# Patient Record
Sex: FEMALE | Race: WHITE | NOT HISPANIC OR LATINO | Employment: UNEMPLOYED | URBAN - NONMETROPOLITAN AREA
[De-identification: names, ages, dates, MRNs, and addresses within clinical notes are randomized per-mention and may not be internally consistent; named-entity substitution may affect disease eponyms.]

---

## 2017-01-03 ENCOUNTER — APPOINTMENT (OUTPATIENT)
Dept: PHYSICAL THERAPY | Facility: HOSPITAL | Age: 27
End: 2017-01-03

## 2017-01-09 ENCOUNTER — HOSPITAL ENCOUNTER (OUTPATIENT)
Dept: PHYSICAL THERAPY | Facility: HOSPITAL | Age: 27
Setting detail: THERAPIES SERIES
Discharge: HOME | End: 2017-01-09

## 2017-01-09 DIAGNOSIS — M54.41 BILATERAL LOW BACK PAIN WITH BILATERAL SCIATICA, UNSPECIFIED CHRONICITY: Primary | ICD-10-CM

## 2017-01-09 DIAGNOSIS — M54.42 BILATERAL LOW BACK PAIN WITH BILATERAL SCIATICA, UNSPECIFIED CHRONICITY: Primary | ICD-10-CM

## 2017-01-09 DIAGNOSIS — S33.6XXA SACROILIAC (LIGAMENT) SPRAIN, INITIAL ENCOUNTER: ICD-10-CM

## 2017-01-09 PROCEDURE — 97162 PT EVAL MOD COMPLEX 30 MIN: CPT | Performed by: PHYSICAL THERAPIST
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
## 2017-01-13 ENCOUNTER — APPOINTMENT (OUTPATIENT)
Dept: PHYSICAL THERAPY | Facility: HOSPITAL | Age: 27
End: 2017-01-13

## 2017-01-19 ENCOUNTER — HOSPITAL ENCOUNTER (OUTPATIENT)
Dept: PHYSICAL THERAPY | Facility: HOSPITAL | Age: 27
Setting detail: THERAPIES SERIES
Discharge: HOME | End: 2017-01-19

## 2017-01-19 DIAGNOSIS — S33.6XXA SACROILIAC (LIGAMENT) SPRAIN, INITIAL ENCOUNTER: ICD-10-CM

## 2017-01-19 DIAGNOSIS — M54.42 BILATERAL LOW BACK PAIN WITH BILATERAL SCIATICA, UNSPECIFIED CHRONICITY: Primary | ICD-10-CM

## 2017-01-19 DIAGNOSIS — M54.41 BILATERAL LOW BACK PAIN WITH BILATERAL SCIATICA, UNSPECIFIED CHRONICITY: Primary | ICD-10-CM

## 2017-01-19 PROCEDURE — 97140 MANUAL THERAPY 1/> REGIONS: CPT

## 2017-01-19 PROCEDURE — 97110 THERAPEUTIC EXERCISES: CPT

## 2017-01-19 NOTE — PROGRESS NOTES
Outpatient Physical Therapy Ortho Progress Note  [facility]     Patient Name: Laura Morales  : 1990  MRN: [id number]  Today's Date: 2017      Visit Date: 2017    Visit Dx:    ICD-10-CM ICD-9-CM   1. Bilateral low back pain with bilateral sciatica, unspecified chronicity M54.42 724.3    M54.41    2. Sacroiliac (ligament) sprain, initial encounter S33.6XXA 846.1       There is no problem list on file for this patient.       Past Medical History   Diagnosis Date   • Fracture of rib of left side         Past Surgical History   Procedure Laterality Date   • Cystectomy Right 2014     right breast                             PT Assessment/Plan       17 1434          PT Assessment    Assessment Comments Patient has not met any goals at this time due to this being her first session since initial evaulation.  She continues to complain of pain in low back and described as burning and dull pain.  She presented with very guarded lumbar paraspinals and bilateral piriformis muscles.with lumbosacral tenderness.  -KELLY      PT Plan    PT Plan Comments We will continue to focus on flexibility and core strengthening.  -KELLY        User Key  (r) = Recorded By, (t) = Taken By, (c) = Cosigned By    Initials Name Provider Type    KELLY Radford PTA Physical Therapy Assistant                    Exercises       17 1437          Subjective Comments    Subjective Comments Patient voices her pain is characterized as burning and dull pain mainly in right side.  -KELLY      Subjective Pain    Able to rate subjective pain? yes  -KELLY      Pre-Treatment Pain Level 5   Burning and dull pain   -KELLY      Post-Treatment Pain Level 4  -KELLY      Exercise 1    Exercise Name 1 Prone on elbows   after cat/cow stretches, prayer stretch d/t N/T in RLE  -KELLY      Cueing 1 Verbal;Tactile   N/T subsided once in MARCIE  -KELLY      Reps 1 3  -KELLY      Time (Minutes) 1 2  -KELLY      Exercise 2    Exercise Name 2 prayer stretch  -KELLY       Cueing 2 Verbal;Tactile  -KELLY      Reps 2 3  -KELLY      Time (Seconds) 2 20 seconds   -KELLY      Intensity 2 --   Increased N/T after performing  -KELLY      Exercise 3    Exercise Name 3 cat/cow stretch  -KELLY      Cueing 3 Verbal;Tactile  -KELLY      Reps 3 5  -KELLY      Time (Seconds) 3 10 seconds   each position  -KELLY      Intensity 3 --   increased N/T after performing  -KELLY      Exercise 4    Exercise Name 4 jeaneth SKTC  -KELLY      Cueing 4 Verbal;Tactile  -KELLY      Reps 4 3   each side  -KELLY      Time (Seconds) 4 20 seconds  -KELLY      Exercise 5    Exercise Name 5 lumbar rotation stretch  -KELLY      Cueing 5 Verbal  -KELLY      Sets 5 3   each side  -KELLY      Time (Seconds) 5 20 seconds  -KELLY      Exercise 6    Exercise Name 6 PPT   added to HEP  -KELLY      Cueing 6 Tactile  -KELLY      Sets 6 2  -KELLY      Reps 6 10  -KELLY      Time (Seconds) 6 3 second hold  -KELLY      Exercise 7    Exercise Name 7 PPT bridges   added to HEP  -KELLY      Cueing 7 Verbal  -KELLY      Sets 7 2  -KELLY      Reps 7 10  -KELLY        User Key  (r) = Recorded By, (t) = Taken By, (c) = Cosigned By    Initials Name Provider Type    KELLY Radford PTA Physical Therapy Assistant                        Manual Rx (last 36 hours)      Manual Treatments       01/19/17 1446          Manual Rx 1    Manual Rx 1 Location STM to jeaneth lumbar paraspinals, piriformis  -KELLY      Manual Rx 1 Grade light  -KELLY      Manual Rx 1 Duration 8   increased N/T after completing, MARCIE relieved  -KELLY        User Key  (r) = Recorded By, (t) = Taken By, (c) = Cosigned By    Initials Name Provider Type    KELLY Radford PTA Physical Therapy Assistant                PT OP Goals       01/19/17 1434 01/09/17 1700       PT Short Term Goals    STG Date to Achieve 01/30/17  -KELLY 01/30/17  -TB     STG 1 Show symmetrical jeaneth pelvis and leg lengths  -KELLY Show symmetrical jeaneth pelvis and leg lengths  -TB     STG 1 Progress Ongoing  -KELLY New  -TB     STG 2 No radicular symptoms below her knee  -KELLY No radicular symptoms  below her knee  -TB     STG 2 Progress Ongoing  -KELLY New  -TB     STG 2 Progress Comments Patient having N/T into R leg today during treatment, was relieved by MARCIE.  Patient voices she has radicular symptoms 1-2 per week  -KELLY      Long Term Goals    LTG Date to Achieve 02/20/17  -KELLY 02/20/17  -TB     LTG 1 Reports no pain with functional activities at home except occasional minor twinges no more than 1-2/10  -KELLY Reports no pain with functional activities at home except occasional minor twinges no more than 1-2/10  -TB     LTG 1 Progress Ongoing  -KELLY New  -TB     LTG 2 Independent with HEP for core stability and flexibility  -KELLY Independent with HEP for core stability and flexibility  -TB     LTG 2 Progress Ongoing  -KELLY New  -TB     LTG 2 Progress Comments Added PPT, and bridges today  -KELLY      LTG 3 Full flexibility of hips in all planes with no pain  -KELLY Full flexibility of hips in all planes with no pain  -TB     LTG 3 Progress Ongoing  -KELLY New  -TB     LTG 3 Progress Comments Worked on flexibility today  -KELLY      Time Calculation    PT Goal Re-Cert Due Date 02/08/17  -KELLY 02/08/17  -TB       User Key  (r) = Recorded By, (t) = Taken By, (c) = Cosigned By    Initials Name Provider Type    TB Lincoln Adams, PT Physical Therapist    KELLY Radford PTA Physical Therapy Assistant                Therapy Education       01/19/17 1656    Therapy Education    Given HEP   PPT and bridges  -KELLY    Program New  -KELLY    How Provided Verbal;Written  -KELLY    Provided to Patient  -KELLY    Level of Understanding Verbalized;Demonstrated  -KELLY      User Key  (r) = Recorded By, (t) = Taken By, (c) = Cosigned By    Initials Name Provider Type    KELLY Radford PTA Physical Therapy Assistant                Time Calculation:   Start Time: 1434  Stop Time: 1515  Time Calculation (min): 41 min  Total Timed Code Minutes- PT: 41 minute(s)    Therapy Charges for Today     Code Description Service Date Service Provider Modifiers Qty     99234198739  PT MANUAL THERAPY EA 15 MIN 1/19/2017 Suleiman Radford, PTA GP 1    [id number]  PT THER PROC EA 15 MIN 1/19/2017 Suleiman Radford, PTA GP 2                    Suleiman Radford, PTA  1/19/2017

## 2017-01-20 ENCOUNTER — HOSPITAL ENCOUNTER (OUTPATIENT)
Dept: PHYSICAL THERAPY | Facility: HOSPITAL | Age: 27
Setting detail: THERAPIES SERIES
Discharge: HOME | End: 2017-01-20

## 2017-01-20 DIAGNOSIS — S33.6XXA SACROILIAC (LIGAMENT) SPRAIN, INITIAL ENCOUNTER: ICD-10-CM

## 2017-01-20 DIAGNOSIS — M54.42 BILATERAL LOW BACK PAIN WITH BILATERAL SCIATICA, UNSPECIFIED CHRONICITY: Primary | ICD-10-CM

## 2017-01-20 DIAGNOSIS — M54.41 BILATERAL LOW BACK PAIN WITH BILATERAL SCIATICA, UNSPECIFIED CHRONICITY: Primary | ICD-10-CM

## 2017-01-20 PROCEDURE — 97110 THERAPEUTIC EXERCISES: CPT | Performed by: PHYSICAL THERAPIST

## 2017-01-20 PROCEDURE — 97116 GAIT TRAINING THERAPY: CPT | Performed by: PHYSICAL THERAPIST

## 2017-01-20 NOTE — PROGRESS NOTES
Outpatient Physical Therapy Ortho Treatment Note  [facility]     Patient Name: Laura Morales  : 1990  MRN: [id number]  Today's Date: 2017      Visit Date: 2017    Visit Dx:    ICD-10-CM ICD-9-CM   1. Bilateral low back pain with bilateral sciatica, unspecified chronicity M54.42 724.3    M54.41    2. Sacroiliac (ligament) sprain, initial encounter S33.6XXA 846.1       There is no problem list on file for this patient.       Past Medical History   Diagnosis Date   • Fracture of rib of left side         Past Surgical History   Procedure Laterality Date   • Cystectomy Right 2014     right breast                             PT Assessment/Plan       17 1410 17 1434       PT Assessment    Assessment Comments No goals met. She reports increased pain today. I updated her HEP to work on improving mobility. She did have decreased lumbar mobiltiy from L4-S1.   -KR Patient has not met any goals at this time due to this being her first session since initial evaulation.  She continues to complain of pain in low back and described as burning and dull pain.  She presented with very guarded lumbar paraspinals and bilateral piriformis muscles.with lumbosacral tenderness.  -KELLY     PT Plan    PT Plan Comments Continue to work trunk mobiltiy and progress core stabiltiy.   -KR We will continue to focus on flexibility and core strengthening.  -KELLY       User Key  (r) = Recorded By, (t) = Taken By, (c) = Cosigned By    Initials Name Provider Type    ANDRIY Morley, PT Physical Therapist    KELLY Radford, RAND Physical Therapy Assistant                    Exercises       17 1410          Subjective Comments    Subjective Comments She report everything hurts, from work. 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 She reports trying to wear belt when up and moving, but currently not wearing. She reports doing stretch as much as she can. She repots doing a  little more lifting at work today than normal.   -KR      Subjective Pain    Able to rate subjective pain? yes  -KR      Pre-Treatment Pain Level 7  -KR      Post-Treatment Pain Level 7  -KR      Subjective Pain Comment burning in lower back. no radiculalr symptoms now.   -KR      Exercise 1    Exercise Name 1 figure 4 piriformis stretch  -KR      Cueing 1 Verbal;Tactile  -KR      Reps 1 3  -KR      Time (Seconds) 1 30  -KR      Exercise 2    Exercise Name 2 lumbar rotation with SB   -KR      Cueing 2 Verbal  -KR      Reps 2 2  -KR      Time (Seconds) 2 20  -KR      Exercise 3    Exercise Name 3 LTR on SB in small motion  -KR      Reps 3 20  -KR      Exercise 4    Exercise Name 4 mini bridges on SB  with PPT   -KR      Sets 4 2  -KR      Reps 4 10  -KR      Exercise 5    Exercise Name 5 prayer stretch   -KR      Sets 5 3  -KR      Time (Seconds) 5 30  -KR        User Key  (r) = Recorded By, (t) = Taken By, (c) = Cosigned By    Initials Name Provider Type    ANDRIY Morley, PT Physical Therapist                        Manual Rx (last 36 hours)      Manual Treatments       01/20/17 1410 01/19/17 1446       Manual Rx 1    Manual Rx 1 Location in massage chair worked lumbar UPAs   -KR STM to jeaneth lumbar paraspinals, piriformis  -KELLY     Manual Rx 1 Type L4-S1  -KR      Manual Rx 1 Grade 1/2  -KR light  -KELLY     Manual Rx 1 Duration 5  -KR 8   increased N/T after completing, MARCIE relieved  -KELLY     Manual Rx 2    Manual Rx 2 Location in massge chair lumbar paraspinals  -KR      Manual Rx 2 Type STM light  -KR      Manual Rx 2 Duration 6  -KR        User Key  (r) = Recorded By, (t) = Taken By, (c) = Cosigned By    Initials Name Provider Type    ANDRIY Morley, PT Physical Therapist    KELLY Radford, PTA Physical Therapy Assistant                PT OP Goals       01/20/17 1410 01/19/17 1434 01/09/17 1700    PT Short Term Goals    STG Date to Achieve 01/30/17  -KR 01/30/17  -KELLY 01/30/17  -TB    STG 1 Show  symmetrical jeaneth pelvis and leg lengths  -KR Show symmetrical jeaneth pelvis and leg lengths  -KELLY Show symmetrical jeaneth pelvis and leg lengths  -TB    STG 1 Progress Ongoing  -KR Ongoing  -KELLY New  -TB    STG 1 Progress Comments R iliac creast and ASIS higher than L in supine, no abnormality in standing   -KR      STG 2 No radicular symptoms below her knee  -KR No radicular symptoms below her knee  -KELLY No radicular symptoms below her knee  -TB    STG 2 Progress Ongoing  -KR Ongoing  -KELLY New  -TB    STG 2 Progress Comments none today; only burning in lower back  -KR Patient having N/T into R leg today during treatment, was relieved by MARCIE.  Patient voices she has radicular symptoms 1-2 per week  -KELLY     Long Term Goals    LTG Date to Achieve 02/20/17  -KR 02/20/17  -KELLY 02/20/17  -TB    LTG 1 Reports no pain with functional activities at home except occasional minor twinges no more than 1-2/10  -KR Reports no pain with functional activities at home except occasional minor twinges no more than 1-2/10  -KELLY Reports no pain with functional activities at home except occasional minor twinges no more than 1-2/10  -TB    LTG 1 Progress Ongoing  -KR Ongoing  -KELLY New  -TB    LTG 1 Progress Comments 7/10 today   -KR      LTG 2 Independent with HEP for core stability and flexibility  -KR Independent with HEP for core stability and flexibility  -KELLY Independent with HEP for core stability and flexibility  -TB    LTG 2 Progress Ongoing  -KR Ongoing  -KELLY New  -TB    LTG 2 Progress Comments  Added PPT, and bridges today  -KELLY     LTG 3 Full flexibility of hips in all planes with no pain  -KR Full flexibility of hips in all planes with no pain  -KELLY Full flexibility of hips in all planes with no pain  -TB    LTG 3 Progress Ongoing  -KR Ongoing  -KELLY New  -TB    LTG 3 Progress Comments  Worked on flexibility today  -KELLY     Time Calculation    PT Goal Re-Cert Due Date 02/08/17  -KR 02/08/17  -KELLY 02/08/17  -TB      User Key  (r) = Recorded By, (t)  = Taken By, (c) = Cosigned By    Initials Name Provider Type    TB Lincoln Adams, PT Physical Therapist    ANDRIY Morley, PT Physical Therapist    KELLY Radford, RAND Physical Therapy Assistant                Therapy Education       01/20/17 1410    Therapy Education    Given Symptoms/condition management;HEP;Posture/body mechanics  -KR    Program New   prayer stretch figure 4 piriformis stretch  -KR    How Provided Verbal;Demonstration;Written  -KR    Provided to Patient  -KR    Level of Understanding Verbalized;Demonstrated  -KR      01/19/17 1656    Therapy Education    Given HEP   PPT and bridges  -KELLY    Program New  -KELLY    How Provided Verbal;Written  -KELLY    Provided to Patient  -KELLY    Level of Understanding Verbalized;Demonstrated  -KELLY      User Key  (r) = Recorded By, (t) = Taken By, (c) = Cosigned By    Initials Name Provider Type    ANDRIY Morley, PT Physical Therapist    KELLY Radford, RAND Physical Therapy Assistant                Time Calculation:   Start Time: 1410  Stop Time: 1455  Time Calculation (min): 45 min  Total Timed Code Minutes- PT: 45 minute(s)    Therapy Charges for Today     Code Description Service Date Service Provider Modifiers Qty    56739218807 HC PT THER PROC EA 15 MIN 1/20/2017 Anh Morley, PT GP 2    [id number] HC GAIT TRAINING EA 15 MIN 1/20/2017 Anh Morley, PT GP 1                    Anh Morley, PT  1/20/2017

## 2017-01-24 ENCOUNTER — APPOINTMENT (OUTPATIENT)
Dept: PHYSICAL THERAPY | Facility: HOSPITAL | Age: 27
End: 2017-01-24

## 2017-01-25 ENCOUNTER — HOSPITAL ENCOUNTER (OUTPATIENT)
Dept: PHYSICAL THERAPY | Facility: HOSPITAL | Age: 27
Setting detail: THERAPIES SERIES
Discharge: HOME | End: 2017-01-25

## 2017-01-25 DIAGNOSIS — M54.42 BILATERAL LOW BACK PAIN WITH BILATERAL SCIATICA, UNSPECIFIED CHRONICITY: Primary | ICD-10-CM

## 2017-01-25 DIAGNOSIS — M54.41 BILATERAL LOW BACK PAIN WITH BILATERAL SCIATICA, UNSPECIFIED CHRONICITY: Primary | ICD-10-CM

## 2017-01-25 DIAGNOSIS — S33.6XXA SACROILIAC (LIGAMENT) SPRAIN, INITIAL ENCOUNTER: ICD-10-CM

## 2017-01-25 PROCEDURE — 97110 THERAPEUTIC EXERCISES: CPT

## 2017-01-25 PROCEDURE — 97140 MANUAL THERAPY 1/> REGIONS: CPT

## 2017-01-27 ENCOUNTER — APPOINTMENT (OUTPATIENT)
Dept: PHYSICAL THERAPY | Facility: HOSPITAL | Age: 27
End: 2017-01-27

## 2017-02-01 ENCOUNTER — HOSPITAL ENCOUNTER (OUTPATIENT)
Dept: PHYSICAL THERAPY | Facility: HOSPITAL | Age: 27
Setting detail: THERAPIES SERIES
Discharge: HOME | End: 2017-02-01

## 2017-02-01 DIAGNOSIS — S33.6XXA SACROILIAC (LIGAMENT) SPRAIN, INITIAL ENCOUNTER: ICD-10-CM

## 2017-02-01 DIAGNOSIS — M54.41 BILATERAL LOW BACK PAIN WITH BILATERAL SCIATICA, UNSPECIFIED CHRONICITY: Primary | ICD-10-CM

## 2017-02-01 DIAGNOSIS — M54.42 BILATERAL LOW BACK PAIN WITH BILATERAL SCIATICA, UNSPECIFIED CHRONICITY: Primary | ICD-10-CM

## 2017-02-01 PROCEDURE — 97110 THERAPEUTIC EXERCISES: CPT

## 2017-02-01 PROCEDURE — 97140 MANUAL THERAPY 1/> REGIONS: CPT

## 2017-02-01 NOTE — PROGRESS NOTES
Outpatient Physical Therapy Ortho Treatment Note  [facility]     Patient Name: Laura Morales  : 1990  MRN: [id number]  Today's Date: 2017      Visit Date: 2017    Visit Dx:    ICD-10-CM ICD-9-CM   1. Bilateral low back pain with bilateral sciatica, unspecified chronicity M54.42 724.3    M54.41    2. Sacroiliac (ligament) sprain, initial encounter S33.6XXA 846.1       There is no problem list on file for this patient.       Past Medical History   Diagnosis Date   • Fracture of rib of left side         Past Surgical History   Procedure Laterality Date   • Cystectomy Right 2014     right breast                             PT Assessment/Plan       17 1205          PT Assessment    Assessment Comments She reports her symptoms are basically the same except at times her radicular pain will be better. She is non compliant with her SI belt and I educated her once again on the importance of wearing the belt at this time in therapy especially with increased activity, walking, prolonged activity. She reported that she understood and will try to be more compliant. Her right anterior hip capsule was hypomobile today limiting and painful into flexion and IR, this pain was relieved and ROM improved post manual techniques. She still demontrates guarding and ST restrictions along her SI and thoracolumbar spines that continues to refer pain and limit motion d/t fear avoidance. We will continue to address these impairments.   -TC      PT Plan    PT Plan Comments Added neutral bridge, will reassess next session. Continue to focus on thoracolumbar and hip mobility andguarding in order to restore ROM. Reassess SI belt wear. Progress with simultaneous gluteal and core activation activities as tolerated.   -TC        User Key  (r) = Recorded By, (t) = Taken By, (c) = Cosigned By    Initials Name Provider Type    TC Vianca Jewell, PT Physical Therapist                    Exercises       17 [id number]           Subjective Comments    Subjective Comments Pt reported that friday and sat she was a little flared. She sat for >4 hours for basket ball gaame and thinks this may be cause.   -TC      Subjective Pain    Able to rate subjective pain? yes  -TC      Pre-Treatment Pain Level 4  -TC      Post-Treatment Pain Level 4  -TC      Subjective Pain Comment lower back to tailbone  -TC      Exercise 1    Exercise Name 1    -TC      Exercise 2    Exercise Name 2 mini bridge with lower abd contraction    -TC      Cueing 2 Demo  -TC      Sets 2 3  -TC      Reps 2 12  -TC      Exercise 3    Exercise Name 3 mini bridge on green physioball   -TC      Cueing 3 Demo  -TC      Sets 3 3  -TC      Reps 3 12  -TC      Exercise 4    Exercise Name 4 L hip ER stretch self   -TC      Reps 4 3  -TC      Time (Seconds) 4 15s   -TC        User Key  (r) = Recorded By, (t) = Taken By, (c) = Cosigned By    Initials Name Provider Type    TC Vianca Jewell, PT Physical Therapist                        Manual Rx (last 36 hours)      Manual Treatments       02/01/17 1500 02/01/17 1436       Manual Rx 1    Manual Rx 1 Location  STM thoracolumbar spine and R piriformis   -TC     Manual Rx 1 Duration  10  -TC     Manual Rx 2    Manual Rx 2 Location  prone ext mobs   -TC     Manual Rx 2 Type    -TC thoracic and L1-2, PA to Sacrum  -TC     Manual Rx 2 Grade  2-3  -TC     Manual Rx 2 Duration  10  -TC     Manual Rx 3    Manual Rx 3 Location  Anterior R hip  -TC     Manual Rx 3 Type  mob/stretch  -TC     Manual Rx 3 Grade  2-3  -TC     Manual Rx 3 Duration  5  -TC     Manual Rx 4    Manual Rx 4 Location  LAD R hip  -TC     Manual Rx 4 Grade  2-3  -TC     Manual Rx 4 Duration  5  -TC       User Key  (r) = Recorded By, (t) = Taken By, (c) = Cosigned By    Initials Name Provider Type    TC Vianca Jewell, PT Physical Therapist                PT OP Goals       02/01/17 1436 01/25/17 1427 01/25/17 1400    PT Short Term Goals    STG Date to Achieve  01/30/17  -TC  01/30/17  -EC    STG 1 Show symmetrical jeaneth pelvis and leg lengths  -TC  Show symmetrical jeaneth pelvis and leg lengths  -EC    STG 1 Progress Ongoing  -TC  Ongoing  -EC    STG 1 Progress Comments   symmetrical post session today  -EC    STG 2 No radicular symptoms below her knee  -TC  No radicular symptoms below her knee  -EC    STG 2 Progress Ongoing  -TC  Ongoing  -EC    STG 2 Progress Comments she reports she has not had as much radicular pain but it is still present   -TC  R LE into first toe, L LE in calf and fifth toe  -EC    Long Term Goals    LTG Date to Achieve 02/20/17  -TC  02/20/17  -EC    LTG 1 Reports no pain with functional activities at home except occasional minor twinges no more than 1-2/10  -TC  Reports no pain with functional activities at home except occasional minor twinges no more than 1-2/10  -EC    LTG 1 Progress Ongoing  -TC  Ongoing  -EC    LTG 1 Progress Comments 4/10 today pre and post   -TC      LTG 2 Independent with HEP for core stability and flexibility  -TC  Independent with HEP for core stability and flexibility  -EC    LTG 2 Progress Ongoing  -TC  Ongoing  -EC    LTG 2 Progress Comments reports she is istermittently compliant  -TC      LTG 3 Full flexibility of hips in all planes with no pain  -TC  Full flexibility of hips in all planes with no pain  -EC    LTG 3 Progress Ongoing  -TC  Ongoing  -EC    LTG 3 Progress Comments her R hip mobility was improved by 50% today post manual techniques   -TC      Time Calculation    PT Goal Re-Cert Due Date 02/08/17  -TC 02/08/17  -EC       01/20/17 1410 01/19/17 1434       PT Short Term Goals    STG Date to Achieve 01/30/17  -KR 01/30/17  -KELLY     STG 1 Show symmetrical jeaneth pelvis and leg lengths  -KR Show symmetrical jeaneth pelvis and leg lengths  -KELLY     STG 1 Progress Ongoing  -KR Ongoing  -KELLY     STG 1 Progress Comments R iliac creast and ASIS higher than L in supine, no abnormality in standing   -KR      STG 2 No radicular  symptoms below her knee  -KR No radicular symptoms below her knee  -KELLY     STG 2 Progress Ongoing  -KR Ongoing  -KELLY     STG 2 Progress Comments none today; only burning in lower back  -KR Patient having N/T into R leg today during treatment, was relieved by MARCIE.  Patient voices she has radicular symptoms 1-2 per week  -KELLY     Long Term Goals    LTG Date to Achieve 02/20/17  -KR 02/20/17  -KELLY     LTG 1 Reports no pain with functional activities at home except occasional minor twinges no more than 1-2/10  -KR Reports no pain with functional activities at home except occasional minor twinges no more than 1-2/10  -KELLY     LTG 1 Progress Ongoing  -KR Ongoing  -KELLY     LTG 1 Progress Comments 7/10 today   -KR      LTG 2 Independent with HEP for core stability and flexibility  -KR Independent with HEP for core stability and flexibility  -KELLY     LTG 2 Progress Ongoing  -KR Ongoing  -KELLY     LTG 2 Progress Comments  Added PPT, and bridges today  -KELLY     LTG 3 Full flexibility of hips in all planes with no pain  -KR Full flexibility of hips in all planes with no pain  -KELLY     LTG 3 Progress Ongoing  -KR Ongoing  -KELLY     LTG 3 Progress Comments  Worked on flexibility today  -KELLY     Time Calculation    PT Goal Re-Cert Due Date 02/08/17  -KR 02/08/17  -KELLY       User Key  (r) = Recorded By, (t) = Taken By, (c) = Cosigned By    Initials Name Provider Type    EC Michele Montero, PTA Physical Therapy Assistant    ANDRIY Morley, PT Physical Therapist    KELLY Suleiman Radford, PTA Physical Therapy Assistant    AGUSTO Jewell, PT Physical Therapist                Therapy Education       02/01/17 1533    Therapy Education    Given HEP;Posture/body mechanics  -TC    Program New   added neutral bridge  -TC    How Provided Verbal;Demonstration;Written  -TC    Provided to Patient  -TC    Level of Understanding Teach back education performed;Verbalized;Demonstrated  -TC      User Key  (r) = Recorded By, (t) = Taken By, (c) =  Cosigned By    Initials Name Provider Type    TC Vianca Jewell, PT Physical Therapist                Time Calculation:   Start Time: 1436  Stop Time: 1522  Time Calculation (min): 46 min  Total Timed Code Minutes- PT: 46 minute(s)    Therapy Charges for Today     Code Description Service Date Service Provider Modifiers Qty    19277779879 HC PT MANUAL THERAPY EA 15 MIN 2/1/2017 Vianca Jewell, PT GP 2    08563652242 HC PT THER PROC EA 15 MIN 2/1/2017 Vianca Jewell, PT GP 1                    Vianca Jewell, PT  2/1/2017

## 2017-02-03 ENCOUNTER — HOSPITAL ENCOUNTER (OUTPATIENT)
Dept: PHYSICAL THERAPY | Facility: HOSPITAL | Age: 27
Setting detail: THERAPIES SERIES
Discharge: HOME | End: 2017-02-03

## 2017-02-03 DIAGNOSIS — M54.42 BILATERAL LOW BACK PAIN WITH BILATERAL SCIATICA, UNSPECIFIED CHRONICITY: Primary | ICD-10-CM

## 2017-02-03 DIAGNOSIS — M54.41 BILATERAL LOW BACK PAIN WITH BILATERAL SCIATICA, UNSPECIFIED CHRONICITY: Primary | ICD-10-CM

## 2017-02-03 DIAGNOSIS — S33.6XXA SACROILIAC (LIGAMENT) SPRAIN, INITIAL ENCOUNTER: ICD-10-CM

## 2017-02-03 PROCEDURE — 97110 THERAPEUTIC EXERCISES: CPT

## 2017-02-03 NOTE — PROGRESS NOTES
Outpatient Physical Therapy Ortho Treatment Note  [facility]     Patient Name: Laura Morales  : 1990  MRN: [id number]  Today's Date: 2/3/2017      Visit Date: 2017    Visit Dx:    ICD-10-CM ICD-9-CM   1. Bilateral low back pain with bilateral sciatica, unspecified chronicity M54.42 724.3    M54.41    2. Sacroiliac (ligament) sprain, initial encounter S33.6XXA 846.1       There is no problem list on file for this patient.       Past Medical History   Diagnosis Date   • Fracture of rib of left side         Past Surgical History   Procedure Laterality Date   • Cystectomy Right 2014     right breast                             PT Assessment/Plan       17 1537 17 1535       PT Assessment    Assessment Comments She presented today with R upsliped R rotated pelvic orientaton prior to intervention. She has intermittent  radicular symptoms thata do change in nature and soome are atypical.  -EC She reports her symptoms are basically the same except at times her radicular pain will be better. She is non compliant with her SI belt and I educated her once again on the importance of wearing the belt at this time in therapy especially with increased activity, walking, prolonged activity. She reported that she understood and will try to be more compliant. Her right anterior hip capsule was hypomobile today limiting and painful into flexion and IR, this pain was relieved and ROM improved post manual techniques. She still demontrates guarding and ST restrictions along her SI and thoracolumbar spines that continues to refer pain and limit motion d/t fear avoidance. We will continue to address these impairments.   -TC     PT Plan    PT Plan Comments Review all goalss during the next session for recertification and progress as her symptoms allow.  -EC Added neutral bridge, will reassess next session. Continue to focus on thoracolumbar and hip mobility andguarding in order to restore ROM. Reassess SI  belt wear. Progress with simultaneous gluteal and core activation activities as tolerated.   -TC       User Key  (r) = Recorded By, (t) = Taken By, (c) = Cosigned By    Initials Name Provider Type    EC Michele Montero PTA Physical Therapy Assistant    TC Vianca Jewell, PT Physical Therapist                    Exercises       02/03/17 1400          Subjective Comments    Subjective Comments Pt reports a burning sensation   -EC      Subjective Pain    Able to rate subjective pain? yes  -EC      Pre-Treatment Pain Level 2  -EC      Post-Treatment Pain Level 0  -EC      Exercise 1    Exercise Name 1 B isometric hip add  -EC      Cueing 1 Verbal;Tactile  -EC      Equipment 1 --   orange ball  -EC      Reps 1 15  -EC      Exercise 2    Exercise Name 2 isometric pelvic rotation  -EC      Equipment 2 --   45 cm swiss ball  -EC      Reps 2 15  -EC      Exercise 3    Exercise Name 3 B LE muscle synergy  -EC      Reps 3 10  -EC      Exercise 4    Exercise Name 4 R hip IR stretches preceeded by inferior lateral hip glides Grade 2  -EC      Reps 4 3  -EC      Time (Seconds) 4 30  -EC      Exercise 5    Exercise Name 5 assessed orientation of pelvis in standing and hooklying before and after interventions above  -EC      Exercise 6    Exercise Name 6 bridging  -EC      Cueing 6 Verbal  -EC      Reps 6 20  -EC      Exercise 7    Exercise Name 7 hookying clamshells   -EC      Equipment 7 Theraband  -EC      Resistance 7 Red  -EC      Reps 7 15  -EC      Exercise 8    Exercise Name 8 SLFO's  -EC      Reps 8 10  -EC        User Key  (r) = Recorded By, (t) = Taken By, (c) = Cosigned By    Initials Name Provider Type    EC Michele Montero PTA Physical Therapy Assistant                        Manual Rx (last 36 hours)      Manual Treatments       02/03/17 1500          Manual Rx 1    Manual Rx 1 Location B gastroc  -EC      Manual Rx 1 Type IASTM with EDGE tool  -EC      Manual Rx 1 Duration 5   2.5 each  -EC        User Key   (r) = Recorded By, (t) = Taken By, (c) = Cosigned By    Initials Name Provider Type    ABBY Montero, RAND Physical Therapy Assistant                PT OP Goals       02/03/17 1500 02/03/17 1430 02/01/17 1436    PT Short Term Goals    STG Date to Achieve 01/30/17  -EC  01/30/17  -TC    STG 1 Show symmetrical jeaneth pelvis and leg lengths  -EC  Show symmetrical jeaneth pelvis and leg lengths  -TC    STG 1 Progress Ongoing  -EC  Ongoing  -TC    STG 2 No radicular symptoms below her knee  -EC  No radicular symptoms below her knee  -TC    STG 2 Progress Ongoing  -EC  Ongoing  -TC    STG 2 Progress Comments continues to report intermittent radicular symptoms  -EC  she reports she has not had as much radicular pain but it is still present   -TC    Long Term Goals    LTG Date to Achieve 02/20/17  -EC  02/20/17  -TC    LTG 1 Reports no pain with functional activities at home except occasional minor twinges no more than 1-2/10  -EC  Reports no pain with functional activities at home except occasional minor twinges no more than 1-2/10  -TC    LTG 1 Progress Ongoing  -EC  Ongoing  -TC    LTG 1 Progress Comments   4/10 today pre and post   -TC    LTG 2 Independent with HEP for core stability and flexibility  -EC  Independent with HEP for core stability and flexibility  -TC    LTG 2 Progress Ongoing  -EC  Ongoing  -TC    LTG 2 Progress Comments   reports she is istermittently compliant  -TC    LTG 3 Full flexibility of hips in all planes with no pain  -EC  Full flexibility of hips in all planes with no pain  -TC    LTG 3 Progress Ongoing  -EC  Ongoing  -TC    LTG 3 Progress Comments R hip IR improved but c/o tightness and discomfort  -EC  her R hip mobility was improved by 50% today post manual techniques   -TC    Time Calculation    PT Goal Re-Cert Due Date  02/08/17  -EC 02/08/17  -TC      01/25/17 1427 01/25/17 1400       PT Short Term Goals    STG Date to Achieve  01/30/17  -EC     STG 1  Show symmetrical jeaneth pelvis and leg  lengths  -EC     STG 1 Progress  Ongoing  -EC     STG 1 Progress Comments  symmetrical post session today  -EC     STG 2  No radicular symptoms below her knee  -EC     STG 2 Progress  Ongoing  -EC     STG 2 Progress Comments  R LE into first toe, L LE in calf and fifth toe  -EC     Long Term Goals    LTG Date to Achieve  02/20/17  -EC     LTG 1  Reports no pain with functional activities at home except occasional minor twinges no more than 1-2/10  -EC     LTG 1 Progress  Ongoing  -EC     LTG 2  Independent with HEP for core stability and flexibility  -EC     LTG 2 Progress  Ongoing  -EC     LTG 3  Full flexibility of hips in all planes with no pain  -EC     LTG 3 Progress  Ongoing  -EC     Time Calculation    PT Goal Re-Cert Due Date 02/08/17  -EC        User Key  (r) = Recorded By, (t) = Taken By, (c) = Cosigned By    Initials Name Provider Type    EC Michele Montero PTA Physical Therapy Assistant    TC Vianca Jewell, PT Physical Therapist                Therapy Education       02/01/17 1533    Therapy Education    Given HEP;Posture/body mechanics  -TC    Program New   added neutral bridge  -TC    How Provided Verbal;Demonstration;Written  -TC    Provided to Patient  -TC    Level of Understanding Teach back education performed;Verbalized;Demonstrated  -TC      User Key  (r) = Recorded By, (t) = Taken By, (c) = Cosigned By    Initials Name Provider Type    TC Vianca Jewell, PT Physical Therapist                Time Calculation:   Start Time: 0230  Stop Time: 0315  Time Calculation (min): 45 min  Total Timed Code Minutes- PT: 45 minute(s)    Therapy Charges for Today     Code Description Service Date Service Provider Modifiers Qty    [id number]  PT THER PROC EA 15 MIN 2/3/2017 Michele Montero PTA GP 3                    Michele Montero PTA  2/3/2017

## 2017-02-07 ENCOUNTER — APPOINTMENT (OUTPATIENT)
Dept: PHYSICAL THERAPY | Facility: HOSPITAL | Age: 27
End: 2017-02-07

## 2017-02-08 ENCOUNTER — HOSPITAL ENCOUNTER (OUTPATIENT)
Dept: PHYSICAL THERAPY | Facility: HOSPITAL | Age: 27
Setting detail: THERAPIES SERIES
Discharge: HOME | End: 2017-02-08

## 2017-02-08 DIAGNOSIS — M54.42 BILATERAL LOW BACK PAIN WITH BILATERAL SCIATICA, UNSPECIFIED CHRONICITY: Primary | ICD-10-CM

## 2017-02-08 DIAGNOSIS — M54.41 BILATERAL LOW BACK PAIN WITH BILATERAL SCIATICA, UNSPECIFIED CHRONICITY: Primary | ICD-10-CM

## 2017-02-08 DIAGNOSIS — S33.6XXA SACROILIAC (LIGAMENT) SPRAIN, INITIAL ENCOUNTER: ICD-10-CM

## 2017-02-08 PROCEDURE — 97140 MANUAL THERAPY 1/> REGIONS: CPT

## 2017-02-08 PROCEDURE — 97110 THERAPEUTIC EXERCISES: CPT

## 2017-02-08 NOTE — PROGRESS NOTES
Outpatient Physical Therapy Ortho Treatment Note   [place]     Patient Name: Laura Morales  : 1990  MRN: [id number]  Today's Date: 2017      Visit Date: 2017    Visit Dx:    ICD-10-CM ICD-9-CM   1. Bilateral low back pain with bilateral sciatica, unspecified chronicity M54.42 724.3    M54.41    2. Sacroiliac (ligament) sprain, initial encounter S33.6XXA 846.1       There is no problem list on file for this patient.       Past Medical History   Diagnosis Date   • Fracture of rib of left side         Past Surgical History   Procedure Laterality Date   • Cystectomy Right 2014     right breast                             PT Assessment/Plan       17 1650 17 1537       PT Assessment    Functional Limitations Decreased safety during functional activities;Performance in self-care ADL;Performance in leisure activities;Limitations in functional capacity and performance;Performance in work activities  -TC      Impairments Balance;Impaired flexibility;Pain;Range of motion;Posture;Impaired muscle length;Joint mobility;Impaired postural alignment  -TC      Assessment Comments Patient has not met any goals at this time but is progressing towards all just slowly, we are mainly limited by the chronicity of her symptoms and her fear avoidance patterns. She did report that she feels better when she leaves and she always sleeps better after therapy. She reports she has been more compliant with her HEP as well. Her hip ROM and posture have improved the most except with hip IR which continue to also reproduce some SI and groin pain on the right and this impairment along with continued thoracic and sacral hypomobility exacerbate her symptoms. Her core and hip muscles are still weak as well placing increased strain across her lower lumbar spine. We will continue to address these impairments in order for her to return to her PLOF and ability to care for her 4 year old child and participate more in  work.   -TC She presented today with R upsliped R rotated pelvic orientaton prior to intervention. She has intermittent  radicular symptoms thata do change in nature and soome are atypical.  -EC     Please refer to paper survey for additional self-reported information Yes  -TC      Rehab Potential Good  -TC      Patient/caregiver participated in establishment of treatment plan and goals Yes  -TC      Patient would benefit from skilled therapy intervention Yes  -TC      PT Plan    PT Frequency 2x/week  -TC      Predicted Duration of Therapy Intervention (days/wks) 6 weeks   -TC      Planned CPT's? PT THER PROC EA 15 MIN: 46666;PT THER ACT EA 15 MIN: 82732;PT MANUAL THERAPY EA 15 MIN: 50420;PT NEUROMUSC RE-EDUCATION EA 15 MIN: 56095;PT ELECTRICAL STIM ATTD EA 15 MIN: 86887;PT ELECTRICAL STIM UNATTEND:   -TC      Physical Therapy Interventions (Optional Details) balance training;taping;swiss ball techniques;strengthening;ROM (Range of Motion);postural re-education;patient/family education;manual therapy techniques;modalities;motor coordination training;gross motor skills;neuromuscular re-education  -TC      PT Plan Comments We will continue to address her mobility and flexibility and work to fully restore her ROM and promote an more neutral posturing. We will progress her towards more hip and core stability and strengthening excercises as well in order to provide a strong base for her spine to move.   -TC Review all goalss during the next session for recertification and progress as her symptoms allow.  -EC       User Key  (r) = Recorded By, (t) = Taken By, (c) = Cosigned By    Initials Name Provider Type    EC Michele Montero, PTA Physical Therapy Assistant    TC Vianca Jewell, PT Physical Therapist                    Exercises       02/08/17 1433          Subjective Comments    Subjective Comments Pt reported that she feels the burning sensation is going all the way up her back now. She has been getting  spasms in her jeaneth calves and thighs since last session.    -TC      Subjective Pain    Able to rate subjective pain? yes  -TC      Pre-Treatment Pain Level 2  -TC      Post-Treatment Pain Level 0  -TC      Exercise 1    Exercise Name 1 thoracic wall crawl   -TC      Cueing 1 Demo  -TC      Reps 1 3  -TC      Time (Seconds) 1 15s  -TC      Exercise 2    Exercise Name 2 thoracic mobility   -TC      Cueing 2 Demo  -TC      Reps 2 3  -TC      Time (Seconds) 2 15s  -TC      Exercise 3    Exercise Name 3 physioball core marches   -TC      Cueing 3 Demo  -TC      Sets 3 2  -TC      Reps 3 10  -TC      Exercise 4    Exercise Name 4 plank on physioball   -TC      Reps 4 5  -TC      Time (Seconds) 4 15s  -TC      Exercise 5    Exercise Name 5 thoracic and lumbar stretch on physioball   -TC      Cueing 5 Demo  -TC      Reps 5 3  -TC      Time (Seconds) 5 15s  -TC        User Key  (r) = Recorded By, (t) = Taken By, (c) = Cosigned By    Initials Name Provider Type    TC Vianca Jewell, PT Physical Therapist                        Manual Rx (last 36 hours)      Manual Treatments       02/08/17 1433          Manual Rx 1    Manual Rx 1 Location STM thoracolumbar spine and R piriformis   -TC      Manual Rx 1 Duration 10  -TC      Manual Rx 2    Manual Rx 2 Location prone ext mobs   -TC      Manual Rx 2 Type thoracic and L1-2, PA to Sacrum   sacral PA felt most relieving   -TC      Manual Rx 2 Grade 2-3  -TC      Manual Rx 2 Duration 10  -TC      Manual Rx 3    Manual Rx 3 Location Anterior R hip  -TC      Manual Rx 3 Type mob/stretch  -TC      Manual Rx 3 Grade 2-3  -TC      Manual Rx 3 Duration 5  -TC      Manual Rx 4    Manual Rx 4 Location LAD RLE    reported improved sharp pain into groin afterwards   -TC      Manual Rx 4 Grade 3  -TC      Manual Rx 4 Duration 5  -TC        User Key  (r) = Recorded By, (t) = Taken By, (c) = Cosigned By    Initials Name Provider Type    TC Vianca Jewell, PT Physical Therapist                 PT OP Goals       02/08/17 1433 02/03/17 1500 02/03/17 1430    PT Short Term Goals    STG Date to Achieve 03/01/17  -TC 01/30/17  -EC     STG 1 Show symmetrical jeaneth pelvis and leg lengths  -TC Show symmetrical jeaneth pelvis and leg lengths  -EC     STG 1 Progress Progressing  -TC Ongoing  -EC     STG 1 Progress Comments Demonstrates slight upslip still on the right   -TC      STG 2 No radicular symptoms below her knee  -TC No radicular symptoms below her knee  -EC     STG 2 Progress Ongoing  -TC Ongoing  -EC     STG 2 Progress Comments she reports nothing past the knee over the weekend; but has had intense calf spasms after last session   -TC continues to report intermittent radicular symptoms  -EC     Long Term Goals    LTG Date to Achieve 03/22/17  -TC 02/20/17  -EC     LTG 1 Reports no pain with functional activities at home except occasional minor twinges no more than 1-2/10  -TC Reports no pain with functional activities at home except occasional minor twinges no more than 1-2/10  -EC     LTG 1 Progress Progressing  -TC Ongoing  -EC     LTG 1 Progress Comments still complaining of pain with lifting, carrying, cleaning, sleeping and other daily activities   -TC      LTG 2 Independent with HEP for core stability and flexibility  -TC Independent with HEP for core stability and flexibility  -EC     LTG 2 Progress Progressing  -TC Ongoing  -EC     LTG 2 Progress Comments she reports she has been more compliant   -TC      LTG 3 Full flexibility of hips in all planes with no pain  -TC Full flexibility of hips in all planes with no pain  -EC     LTG 3 Progress Progressing  -TC Ongoing  -EC     LTG 3 Progress Comments still limited and painful with R hip IR   -TC R hip IR improved but c/o tightness and discomfort  -EC     Time Calculation    PT Goal Re-Cert Due Date 03/08/17  -TC  02/08/17  -EC      02/01/17 1436          PT Short Term Goals    STG Date to Achieve 01/30/17  -TC      STG 1 Show symmetrical jeaneth pelvis  and leg lengths  -TC      STG 1 Progress Ongoing  -TC      STG 2 No radicular symptoms below her knee  -TC      STG 2 Progress Ongoing  -TC      STG 2 Progress Comments she reports she has not had as much radicular pain but it is still present   -TC      Long Term Goals    LTG Date to Achieve 02/20/17  -TC      LTG 1 Reports no pain with functional activities at home except occasional minor twinges no more than 1-2/10  -TC      LTG 1 Progress Ongoing  -TC      LTG 1 Progress Comments 4/10 today pre and post   -TC      LTG 2 Independent with HEP for core stability and flexibility  -TC      LTG 2 Progress Ongoing  -TC      LTG 2 Progress Comments reports she is istermittently compliant  -TC      LTG 3 Full flexibility of hips in all planes with no pain  -TC      LTG 3 Progress Ongoing  -TC      LTG 3 Progress Comments her R hip mobility was improved by 50% today post manual techniques   -TC      Time Calculation    PT Goal Re-Cert Due Date 02/08/17  -TC        User Key  (r) = Recorded By, (t) = Taken By, (c) = Cosigned By    Initials Name Provider Type    EC Michele Montero, PTA Physical Therapy Assistant    AGUSTO Jewell, PT Physical Therapist                Therapy Education       02/08/17 1649    Therapy Education    Given HEP;Symptoms/condition management;Posture/body mechanics   re-educated on posture, and getting a physioball   -TC    Program Reinforced  -TC    How Provided Verbal  -TC    Provided to Patient  -TC    Level of Understanding Verbalized;Demonstrated  -TC      User Key  (r) = Recorded By, (t) = Taken By, (c) = Cosigned By    Initials Name Provider Type    TC Vianca Jewell, PT Physical Therapist                Time Calculation:   Start Time: 1433  Stop Time: 1523  Time Calculation (min): 50 min  Total Timed Code Minutes- PT: 50 minute(s)    Therapy Charges for Today     Code Description Service Date Service Provider Modifiers Qty    26552813022 HC PT MANUAL THERAPY EA 15 MIN 2/8/2017  Vianca Jewell, PT GP 2    [id number]  PT THER PROC EA 15 MIN 2/8/2017 Vianca Jewell, PT GP 1                    Vianca Jewell, PT  2/8/2017

## 2017-02-10 ENCOUNTER — HOSPITAL ENCOUNTER (OUTPATIENT)
Dept: PHYSICAL THERAPY | Facility: HOSPITAL | Age: 27
Setting detail: THERAPIES SERIES
Discharge: HOME | End: 2017-02-10

## 2017-02-10 DIAGNOSIS — M54.41 BILATERAL LOW BACK PAIN WITH BILATERAL SCIATICA, UNSPECIFIED CHRONICITY: Primary | ICD-10-CM

## 2017-02-10 DIAGNOSIS — M54.42 BILATERAL LOW BACK PAIN WITH BILATERAL SCIATICA, UNSPECIFIED CHRONICITY: Primary | ICD-10-CM

## 2017-02-10 DIAGNOSIS — S33.6XXA SACROILIAC (LIGAMENT) SPRAIN, INITIAL ENCOUNTER: ICD-10-CM

## 2017-02-10 PROCEDURE — 97140 MANUAL THERAPY 1/> REGIONS: CPT

## 2017-02-10 PROCEDURE — 97110 THERAPEUTIC EXERCISES: CPT

## 2017-02-10 NOTE — PROGRESS NOTES
"    Outpatient Physical Therapy Ortho Treatment Note  [facility]     Patient Name: Laura Morales  : 1990  MRN: [id number]  Today's Date: 2/10/2017      Visit Date: 02/10/2017    Visit Dx:    ICD-10-CM ICD-9-CM   1. Bilateral low back pain with bilateral sciatica, unspecified chronicity M54.42 724.3    M54.41    2. Sacroiliac (ligament) sprain, initial encounter S33.6XXA 846.1       There is no problem list on file for this patient.       Past Medical History   Diagnosis Date   • Fracture of rib of left side         Past Surgical History   Procedure Laterality Date   • Cystectomy Right 2014     right breast                             PT Assessment/Plan       02/10/17 1430 17 1650 17 1537    PT Assessment    Functional Limitations  Decreased safety during functional activities;Performance in self-care ADL;Performance in leisure activities;Limitations in functional capacity and performance;Performance in work activities  -TC     Impairments  Balance;Impaired flexibility;Pain;Range of motion;Posture;Impaired muscle length;Joint mobility;Impaired postural alignment  -TC     Assessment Comments Patient feels like she is some better overall.  She had pain in the R low back to R SI area after STM, this was better after doing the prayer stretch.  After treatment, she also said her hip \"popped out\"  which her R hip has done this since she was 5 years old.  She does exhibit core weakness with the therapy ball exercises today.  -AL Patient has not met any goals at this time but is progressing towards all just slowly, we are mainly limited by the chronicity of her symptoms and her fear avoidance patterns. She did report that she feels better when she leaves and she always sleeps better after therapy. She reports she has been more compliant with her HEP as well. Her hip ROM and posture have improved the most except with hip IR which continue to also reproduce some SI and groin pain on the right and " this impairment along with continued thoracic and sacral hypomobility exacerbate her symptoms. Her core and hip muscles are still weak as well placing increased strain across her lower lumbar spine. We will continue to address these impairments in order for her to return to her PLOF and ability to care for her 4 year old child and participate more in work.   -TC She presented today with R upsliped R rotated pelvic orientaton prior to intervention. She has intermittent  radicular symptoms thata do change in nature and soome are atypical.  -EC    Please refer to paper survey for additional self-reported information  Yes  -TC     Rehab Potential  Good  -TC     Patient/caregiver participated in establishment of treatment plan and goals  Yes  -TC     Patient would benefit from skilled therapy intervention  Yes  -TC     PT Plan    PT Frequency  2x/week  -TC     Predicted Duration of Therapy Intervention (days/wks)  6 weeks   -TC     Planned CPT's?  PT THER PROC EA 15 MIN: 65442;PT THER ACT EA 15 MIN: 89246;PT MANUAL THERAPY EA 15 MIN: 56123;PT NEUROMUSC RE-EDUCATION EA 15 MIN: 77816;PT ELECTRICAL STIM ATTD EA 15 MIN: 19778;PT ELECTRICAL STIM UNATTEND:   -TC     Physical Therapy Interventions (Optional Details)  balance training;taping;swiss ball techniques;strengthening;ROM (Range of Motion);postural re-education;patient/family education;manual therapy techniques;modalities;motor coordination training;gross motor skills;neuromuscular re-education  -TC     PT Plan Comments Will continue to progress with core strengthening as well as improving posture.  -AL We will continue to address her mobility and flexibility and work to fully restore her ROM and promote an more neutral posturing. We will progress her towards more hip and core stability and strengthening excercises as well in order to provide a strong base for her spine to move.   -TC Review all goalss during the next session for recertification and progress as her  symptoms allow.  -EC      User Key  (r) = Recorded By, (t) = Taken By, (c) = Cosigned By    Initials Name Provider Type    EC Michele Montero, RAND Physical Therapy Assistant    BOBBY Vargas PTA Physical Therapy Assistant    AGUSTO Jewell, PT Physical Therapist                    Exercises       02/10/17 1430          Subjective Comments    Subjective Comments She is having a little burning,   this is in the scarum and low back area and on the left side.  Rates this 3-4/10  After two hours at work, has stiffness in the loweback.   -AL      Subjective Pain    Able to rate subjective pain? yes  -AL      Pre-Treatment Pain Level 4  -AL      Post-Treatment Pain Level 5  -AL      Subjective Pain Comment Low back to just below R SI  -AL      Exercise 1    Exercise Name 1 On Therapy ball march with PPT  -AL      Cueing 1 Verbal  -AL      Reps 1 2  -AL      Time (Minutes) 1 15  -AL      Exercise 2    Exercise Name 2 Swiss Ball alternating  shoulder /hip flexion  -AL      Cueing 2 Tactile  -AL      Sets 2 1  -AL      Reps 2 15  -AL      Exercise 3    Exercise Name 3 Exercise ball LAQ B  -AL      Cueing 3 Tactile  -AL      Sets 3 1  -AL      Reps 3 15  -AL      Exercise 4    Exercise Name 4 UE phasic   -AL      Cueing 4 Verbal  -AL      Resistance 4 Green  -AL      Sets 4 1  -AL      Reps 4 15  -AL        User Key  (r) = Recorded By, (t) = Taken By, (c) = Cosigned By    Initials Name Provider Type    BOBBY Vargas PTA Physical Therapy Assistant                        Manual Rx (last 36 hours)      Manual Treatments       02/10/17 1430          Manual Rx 1    Manual Rx 1 Location STM thoracolumbar spine and R piriformis   -AL      Manual Rx 1 Type --   Caused pain down R LE.  -AL      Manual Rx 1 Duration 10  -AL      Manual Rx 2    Manual Rx 2 Location Prayer stretch after STM x 2  -AL        User Key  (r) = Recorded By, (t) = Taken By, (c) = Cosigned By    Initials Name Provider Type    BOBBY Vargas PTA  Physical Therapy Assistant                PT OP Goals       02/10/17 1430 02/08/17 1433 02/03/17 1500    PT Short Term Goals    STG Date to Achieve 03/01/17  -AL 03/01/17  -TC 01/30/17  -EC    STG 1 Show symmetrical jeaneth pelvis and leg lengths  -AL Show symmetrical jeaneth pelvis and leg lengths  -TC Show symmetrical jeaneth pelvis and leg lengths  -EC    STG 1 Progress Progressing  -AL Progressing  -TC Ongoing  -EC    STG 1 Progress Comments  Demonstrates slight upslip still on the right   -TC     STG 2 No radicular symptoms below her knee  -AL No radicular symptoms below her knee  -TC No radicular symptoms below her knee  -EC    STG 2 Progress Ongoing  -AL Ongoing  -TC Ongoing  -EC    STG 2 Progress Comments Had pain below R knee durning STM today, and also during exercises on the ball  -AL she reports nothing past the knee over the weekend; but has had intense calf spasms after last session   -TC continues to report intermittent radicular symptoms  -EC    Long Term Goals    LTG Date to Achieve 03/22/17  -AL 03/22/17  -TC 02/20/17  -EC    LTG 1 Reports no pain with functional activities at home except occasional minor twinges no more than 1-2/10  -AL Reports no pain with functional activities at home except occasional minor twinges no more than 1-2/10  -TC Reports no pain with functional activities at home except occasional minor twinges no more than 1-2/10  -EC    LTG 1 Progress Progressing  -AL Progressing  -TC Ongoing  -EC    LTG 1 Progress Comments  still complaining of pain with lifting, carrying, cleaning, sleeping and other daily activities   -TC     LTG 2 Independent with HEP for core stability and flexibility  -AL Independent with HEP for core stability and flexibility  -TC Independent with HEP for core stability and flexibility  -EC    LTG 2 Progress Progressing  -AL Progressing  -TC Ongoing  -EC    LTG 2 Progress Comments She is working on her HEp  -AL she reports she has been more compliant   -TC     LTG 3 Full  flexibility of hips in all planes with no pain  -AL Full flexibility of hips in all planes with no pain  -TC Full flexibility of hips in all planes with no pain  -EC    LTG 3 Progress Progressing  -AL Progressing  -TC Ongoing  -EC    LTG 3 Progress Comments  still limited and painful with R hip IR   -TC R hip IR improved but c/o tightness and discomfort  -EC    Time Calculation    PT Goal Re-Cert Due Date 03/08/17  -AL 03/08/17  -TC       02/03/17 1430 02/01/17 1436       PT Short Term Goals    STG Date to Achieve  01/30/17  -TC     STG 1  Show symmetrical jeaneth pelvis and leg lengths  -TC     STG 1 Progress  Ongoing  -TC     STG 2  No radicular symptoms below her knee  -TC     STG 2 Progress  Ongoing  -TC     STG 2 Progress Comments  she reports she has not had as much radicular pain but it is still present   -TC     Long Term Goals    LTG Date to Achieve  02/20/17  -TC     LTG 1  Reports no pain with functional activities at home except occasional minor twinges no more than 1-2/10  -TC     LTG 1 Progress  Ongoing  -TC     LTG 1 Progress Comments  4/10 today pre and post   -TC     LTG 2  Independent with HEP for core stability and flexibility  -TC     LTG 2 Progress  Ongoing  -TC     LTG 2 Progress Comments  reports she is istermittently compliant  -TC     LTG 3  Full flexibility of hips in all planes with no pain  -TC     LTG 3 Progress  Ongoing  -TC     LTG 3 Progress Comments  her R hip mobility was improved by 50% today post manual techniques   -TC     Time Calculation    PT Goal Re-Cert Due Date 02/08/17  -EC 02/08/17  -TC       User Key  (r) = Recorded By, (t) = Taken By, (c) = Cosigned By    Initials Name Provider Type    EC Michele Montero, PTA Physical Therapy Assistant    BOBBY Vargas, PTA Physical Therapy Assistant    TC Vianca Jewell, PT Physical Therapist                Therapy Education       02/10/17 1430    Therapy Education    Given HEP  -AL    Program Reinforced  -AL    How Provided Verbal   -AL    Provided to Patient  -AL    Level of Understanding Verbalized  -AL      02/08/17 1649    Therapy Education    Given HEP;Symptoms/condition management;Posture/body mechanics   re-educated on posture, and getting a physioball   -TC    Program Reinforced  -TC    How Provided Verbal  -TC    Provided to Patient  -TC    Level of Understanding Verbalized;Demonstrated  -TC      User Key  (r) = Recorded By, (t) = Taken By, (c) = Cosigned By    Initials Name Provider Type    AL Sapna Vargas PTA Physical Therapy Assistant    AGUSTO Jewell, PT Physical Therapist                Time Calculation:   Start Time: 1430  Stop Time: 1520  Time Calculation (min): 50 min  Total Timed Code Minutes- PT: 50 minute(s)    Therapy Charges for Today     Code Description Service Date Service Provider Modifiers Qty    63363108427 HC PT THER PROC EA 15 MIN 2/10/2017 Sapna Vargas PTA GP 2    51708115333 HC PT MANUAL THERAPY EA 15 MIN 2/10/2017 Sapna Vargas PTA GP 1                    Sapna Vargas PTA  2/10/2017

## 2017-02-14 ENCOUNTER — HOSPITAL ENCOUNTER (OUTPATIENT)
Dept: PHYSICAL THERAPY | Facility: HOSPITAL | Age: 27
Setting detail: THERAPIES SERIES
Discharge: HOME | End: 2017-02-14

## 2017-02-14 DIAGNOSIS — S33.6XXA SACROILIAC (LIGAMENT) SPRAIN, INITIAL ENCOUNTER: ICD-10-CM

## 2017-02-14 DIAGNOSIS — M54.42 BILATERAL LOW BACK PAIN WITH BILATERAL SCIATICA, UNSPECIFIED CHRONICITY: Primary | ICD-10-CM

## 2017-02-14 DIAGNOSIS — M54.41 BILATERAL LOW BACK PAIN WITH BILATERAL SCIATICA, UNSPECIFIED CHRONICITY: Primary | ICD-10-CM

## 2017-02-14 PROCEDURE — 97110 THERAPEUTIC EXERCISES: CPT

## 2017-02-14 PROCEDURE — 97140 MANUAL THERAPY 1/> REGIONS: CPT

## 2017-02-14 NOTE — PROGRESS NOTES
"    Outpatient Physical Therapy Ortho Treatment Note  [facility]     Patient Name: Laura Morales  : 1990  MRN: [id number]  Today's Date: 2017      Visit Date: 2017    Visit Dx:    ICD-10-CM ICD-9-CM   1. Bilateral low back pain with bilateral sciatica, unspecified chronicity M54.42 724.3    M54.41    2. Sacroiliac (ligament) sprain, initial encounter S33.6XXA 846.1       There is no problem list on file for this patient.       Past Medical History   Diagnosis Date   • Fracture of rib of left side         Past Surgical History   Procedure Laterality Date   • Cystectomy Right 2014     right breast                             PT Assessment/Plan       17 1524 02/10/17 1430 17 1650    PT Assessment    Functional Limitations   Decreased safety during functional activities;Performance in self-care ADL;Performance in leisure activities;Limitations in functional capacity and performance;Performance in work activities  -TC    Impairments   Balance;Impaired flexibility;Pain;Range of motion;Posture;Impaired muscle length;Joint mobility;Impaired postural alignment  -TC    Assessment Comments Pt reports she feels like she iss 30% recovered and she continues to have changes in her symptoms. Point tenderness and soft tissue restrictions present over B PSIS whcih she cannot tolerate much pressure on and her symptoms inccrease ech time we work on this area. She may require some modalities prir to ateempted any DDFR or TPR  -EC Patient feels like she is some better overall.  She had pain in the R low back to R SI area after STM, this was better after doing the prayer stretch.  After treatment, she also said her hip \"popped out\"  which her R hip has done this since she was 5 years old.  She does exhibit core weakness with the therapy ball exercises today.  -AL Patient has not met any goals at this time but is progressing towards all just slowly, we are mainly limited by the chronicity of her " symptoms and her fear avoidance patterns. She did report that she feels better when she leaves and she always sleeps better after therapy. She reports she has been more compliant with her HEP as well. Her hip ROM and posture have improved the most except with hip IR which continue to also reproduce some SI and groin pain on the right and this impairment along with continued thoracic and sacral hypomobility exacerbate her symptoms. Her core and hip muscles are still weak as well placing increased strain across her lower lumbar spine. We will continue to address these impairments in order for her to return to her PLOF and ability to care for her 4 year old child and participate more in work.   -TC    Please refer to paper survey for additional self-reported information   Yes  -TC    Rehab Potential   Good  -TC    Patient/caregiver participated in establishment of treatment plan and goals   Yes  -TC    Patient would benefit from skilled therapy intervention   Yes  -TC    PT Plan    PT Frequency   2x/week  -TC    Predicted Duration of Therapy Intervention (days/wks)   6 weeks   -TC    Planned CPT's?   PT THER PROC EA 15 MIN: 02670;PT THER ACT EA 15 MIN: 91958;PT MANUAL THERAPY EA 15 MIN: 67573;PT NEUROMUSC RE-EDUCATION EA 15 MIN: 07034;PT ELECTRICAL STIM ATTD EA 15 MIN: 89556;PT ELECTRICAL STIM UNATTEND:   -TC    Physical Therapy Interventions (Optional Details)   balance training;taping;swiss ball techniques;strengthening;ROM (Range of Motion);postural re-education;patient/family education;manual therapy techniques;modalities;motor coordination training;gross motor skills;neuromuscular re-education  -TC    PT Plan Comments Authorization for furhter visits are currently pending.  -EC Will continue to progress with core strengthening as well as improving posture.  -AL We will continue to address her mobility and flexibility and work to fully restore her ROM and promote an more neutral posturing. We will progress her  towards more hip and core stability and strengthening excercises as well in order to provide a strong base for her spine to move.   -TC      User Key  (r) = Recorded By, (t) = Taken By, (c) = Cosigned By    Initials Name Provider Type    EC Michele Montero, RAND Physical Therapy Assistant    BOBBY Vargas, PTA Physical Therapy Assistant    AGUSTO Coley Samanta Best, PT Physical Therapist                    Exercises       02/14/17 1400          Subjective Comments    Subjective Comments She is having some burning down her R LE states 30% better over all, she also has burning across her low back  -EC      Subjective Pain    Able to rate subjective pain? yes  -EC      Pre-Treatment Pain Level 4  -EC      Post-Treatment Pain Level 5  -EC      Subjective Pain Comment She also states she thinks theswiss ball activities helped  -EC      Exercise 1    Exercise Name 1 prayer stretch  -EC      Exercise 2    Exercise Name 2 bird dog on the ball  -EC      Cueing 2 Verbal;Demo  -EC      Equipment 2 --   55 cm   -EC      Reps 2 10  -EC      Exercise 3    Exercise Name 3 bridging  -EC      Equipment 3 --   65 cm swiss ball  -EC      Reps 3 20  -EC      Exercise 4    Exercise Name 4 alt TKE seated on SB  -EC      Equipment 4 --   65 cm swiss ball  -EC      Reps 4 20  -EC      Exercise 5    Exercise Name 5 10 muscle synergy   -EC      Reps 5 10  -EC      Exercise 6    Exercise Name 6 alt marching with shoulder fleaxion seated on SB  -EC      Equipment 6 --   65 cm swiss ball  -EC      Reps 6 10  -EC        User Key  (r) = Recorded By, (t) = Taken By, (c) = Cosigned By    Initials Name Provider Type    EC Michele Montero PTA Physical Therapy Assistant                        Manual Rx (last 36 hours)      Manual Treatments       02/14/17 1300          Manual Rx 1    Manual Rx 1 Location B ;ower lumbar, PSIS, and lumbar paraspinals  -EC      Manual Rx 1 Type STM  -EC        User Key  (r) = Recorded By, (t) = Taken By, (c) = Cosigned  By    Initials Name Provider Type    EC Michele Montero, PTA Physical Therapy Assistant                PT OP Goals       02/14/17 1500 02/14/17 1428 02/14/17 1400    PT Short Term Goals    STG Date to Achieve 03/01/17  -EC  03/01/17  -EC    STG 1 Show symmetrical jeaneth pelvis and leg lengths  -EC  Show symmetrical jeaneth pelvis and leg lengths  -EC    STG 1 Progress Progressing  -EC  Progressing  -EC    STG 2 No radicular symptoms below her knee  -EC  No radicular symptoms below her knee  -EC    STG 2 Progress Ongoing  -EC  Ongoing  -EC    STG 2 Progress Comments she continues to have symptoms that intermittently change  -EC      Long Term Goals    LTG Date to Achieve 03/22/17  -EC  03/22/17  -EC    LTG 1 Reports no pain with functional activities at home except occasional minor twinges no more than 1-2/10  -EC  Reports no pain with functional activities at home except occasional minor twinges no more than 1-2/10  -EC    LTG 1 Progress Progressing  -EC  Progressing  -EC    LTG 2 Independent with HEP for core stability and flexibility  -EC  Independent with HEP for core stability and flexibility  -EC    LTG 2 Progress Progressing  -EC  Progressing  -EC    LTG 3 Full flexibility of hips in all planes with no pain  -EC  Full flexibility of hips in all planes with no pain  -EC    LTG 3 Progress Progressing  -EC  Progressing  -EC    Time Calculation    PT Goal Re-Cert Due Date 03/08/17  -EC 03/08/17  -EC       02/10/17 1430 02/08/17 1433 02/03/17 1500    PT Short Term Goals    STG Date to Achieve 03/01/17  -AL 03/01/17  -TC 01/30/17  -EC    STG 1 Show symmetrical jeaneth pelvis and leg lengths  -AL Show symmetrical jeaneth pelvis and leg lengths  -TC Show symmetrical jeaneth pelvis and leg lengths  -EC    STG 1 Progress Progressing  -AL Progressing  -TC Ongoing  -EC    STG 1 Progress Comments  Demonstrates slight upslip still on the right   -TC     STG 2 No radicular symptoms below her knee  -AL No radicular symptoms below her knee  -TC  No radicular symptoms below her knee  -EC    STG 2 Progress Ongoing  -AL Ongoing  -TC Ongoing  -EC    STG 2 Progress Comments Had pain below R knee durning STM today, and also during exercises on the ball  -AL she reports nothing past the knee over the weekend; but has had intense calf spasms after last session   -TC continues to report intermittent radicular symptoms  -EC    Long Term Goals    LTG Date to Achieve 03/22/17  -AL 03/22/17  -TC 02/20/17  -EC    LTG 1 Reports no pain with functional activities at home except occasional minor twinges no more than 1-2/10  -AL Reports no pain with functional activities at home except occasional minor twinges no more than 1-2/10  -TC Reports no pain with functional activities at home except occasional minor twinges no more than 1-2/10  -EC    LTG 1 Progress Progressing  -AL Progressing  -TC Ongoing  -EC    LTG 1 Progress Comments  still complaining of pain with lifting, carrying, cleaning, sleeping and other daily activities   -TC     LTG 2 Independent with HEP for core stability and flexibility  -AL Independent with HEP for core stability and flexibility  -TC Independent with HEP for core stability and flexibility  -EC    LTG 2 Progress Progressing  -AL Progressing  -TC Ongoing  -EC    LTG 2 Progress Comments She is working on her HEp  -AL she reports she has been more compliant   -TC     LTG 3 Full flexibility of hips in all planes with no pain  -AL Full flexibility of hips in all planes with no pain  -TC Full flexibility of hips in all planes with no pain  -EC    LTG 3 Progress Progressing  -AL Progressing  -TC Ongoing  -EC    LTG 3 Progress Comments  still limited and painful with R hip IR   -TC R hip IR improved but c/o tightness and discomfort  -EC    Time Calculation    PT Goal Re-Cert Due Date 03/08/17  -AL 03/08/17  -TC       02/03/17 1430 02/01/17 1436       PT Short Term Goals    STG Date to Achieve  01/30/17  -TC     STG 1  Show symmetrical jeaneth pelvis and leg  lengths  -TC     STG 1 Progress  Ongoing  -TC     STG 2  No radicular symptoms below her knee  -TC     STG 2 Progress  Ongoing  -TC     STG 2 Progress Comments  she reports she has not had as much radicular pain but it is still present   -TC     Long Term Goals    LTG Date to Achieve  02/20/17  -TC     LTG 1  Reports no pain with functional activities at home except occasional minor twinges no more than 1-2/10  -TC     LTG 1 Progress  Ongoing  -TC     LTG 1 Progress Comments  4/10 today pre and post   -TC     LTG 2  Independent with HEP for core stability and flexibility  -TC     LTG 2 Progress  Ongoing  -TC     LTG 2 Progress Comments  reports she is istermittently compliant  -TC     LTG 3  Full flexibility of hips in all planes with no pain  -TC     LTG 3 Progress  Ongoing  -TC     LTG 3 Progress Comments  her R hip mobility was improved by 50% today post manual techniques   -TC     Time Calculation    PT Goal Re-Cert Due Date 02/08/17  -EC 02/08/17  -TC       User Key  (r) = Recorded By, (t) = Taken By, (c) = Cosigned By    Initials Name Provider Type    EC Michele Montero, PTA Physical Therapy Assistant    AL Sapna Vragas, PTA Physical Therapy Assistant    TC Vianca Jewell, PT Physical Therapist                Therapy Education       02/14/17 1523    Therapy Education    Given Symptoms/condition management  -EC    How Provided Verbal  -EC    Provided to Patient  -EC    Level of Understanding Verbalized  -EC      02/10/17 1430    Therapy Education    Given HEP  -AL    Program Reinforced  -AL    How Provided Verbal  -AL    Provided to Patient  -AL    Level of Understanding Verbalized  -AL      02/08/17 1649    Therapy Education    Given HEP;Symptoms/condition management;Posture/body mechanics   re-educated on posture, and getting a physioball   -TC    Program Reinforced  -TC    How Provided Verbal  -TC    Provided to Patient  -TC    Level of Understanding Verbalized;Demonstrated  -TC      User Key  (r) =  Recorded By, (t) = Taken By, (c) = Cosigned By    Initials Name Provider Type    EC Michele Montero, RAND Physical Therapy Assistant    BOBBY Vargas, PTA Physical Therapy Assistant    AGUSTO Jewell, PT Physical Therapist                Time Calculation:   Start Time: 0228    Therapy Charges for Today     Code Description Service Date Service Provider Modifiers Qty    87506983851 HC PT MANUAL THERAPY EA 15 MIN 2/14/2017 Michele Montero, PTA GP 1    [id number] HC PT THER PROC EA 15 MIN 2/14/2017 Michele Montero PTA GP 2                    Michele Montero PTA  2/14/2017

## 2017-02-16 ENCOUNTER — HOSPITAL ENCOUNTER (OUTPATIENT)
Dept: PHYSICAL THERAPY | Facility: HOSPITAL | Age: 27
Setting detail: THERAPIES SERIES
Discharge: HOME | End: 2017-02-16

## 2017-02-16 DIAGNOSIS — M54.41 BILATERAL LOW BACK PAIN WITH BILATERAL SCIATICA, UNSPECIFIED CHRONICITY: Primary | ICD-10-CM

## 2017-02-16 DIAGNOSIS — S33.6XXA SACROILIAC (LIGAMENT) SPRAIN, INITIAL ENCOUNTER: ICD-10-CM

## 2017-02-16 DIAGNOSIS — M54.42 BILATERAL LOW BACK PAIN WITH BILATERAL SCIATICA, UNSPECIFIED CHRONICITY: Primary | ICD-10-CM

## 2017-02-16 PROCEDURE — 97140 MANUAL THERAPY 1/> REGIONS: CPT | Performed by: PHYSICAL THERAPIST

## 2017-02-16 PROCEDURE — 97110 THERAPEUTIC EXERCISES: CPT | Performed by: PHYSICAL THERAPIST

## 2017-02-16 NOTE — PROGRESS NOTES
"    Outpatient Physical Therapy Ortho Treatment Note  [facility]     Patient Name: Laura Morales  : 1990  MRN: [id number]  Today's Date: 2017      Visit Date: 2017    Visit Dx:    ICD-10-CM ICD-9-CM   1. Bilateral low back pain with bilateral sciatica, unspecified chronicity M54.42 724.3    M54.41    2. Sacroiliac (ligament) sprain, initial encounter S33.6XXA 846.1       There is no problem list on file for this patient.       Past Medical History   Diagnosis Date   • Fracture of rib of left side         Past Surgical History   Procedure Laterality Date   • Cystectomy Right 2014     right breast                             PT Assessment/Plan       17 1434 17 1524 02/10/17 1430    PT Assessment    Assessment Comments Today she presents with less pain. She did have guarding along her R lumbar paraspinals that was painful. She needed cues for glut/core activation with bridging and bird dog activity, demonstrating decreased core/hip strength. Her right hip IR was more limited than L.   -KR Pt reports she feels like she iss 30% recovered and she continues to have changes in her symptoms. Point tenderness and soft tissue restrictions present over B PSIS whcih she cannot tolerate much pressure on and her symptoms inccrease ech time we work on this area. She may require some modalities prir to ateempted any DDFR or TPR  -EC Patient feels like she is some better overall.  She had pain in the R low back to R SI area after STM, this was better after doing the prayer stretch.  After treatment, she also said her hip \"popped out\"  which her R hip has done this since she was 5 years old.  She does exhibit core weakness with the therapy ball exercises today.  -AL    PT Plan    PT Plan Comments Continue to work on lumbosacral/hip mobiltiy and progress with core/hip strengthening.   -KR Authorization for furhter visits are currently pending.  -EC Will continue to progress with core strengthening " as well as improving posture.  -AL      User Key  (r) = Recorded By, (t) = Taken By, (c) = Cosigned By    Initials Name Provider Type    EC Michele Montero, PTA Physical Therapy Assistant    BOBBY Vargas, PTA Physical Therapy Assistant    ANDRIY Morley, PT Physical Therapist                    Exercises       02/16/17 1434          Subjective Comments    Subjective Comments She reports sleeping in today and pain seems to be better.   -KR      Subjective Pain    Able to rate subjective pain? yes  -KR      Pre-Treatment Pain Level 3  -KR      Post-Treatment Pain Level 3  -KR      Subjective Pain Comment lower back  -KR      Exercise 1    Exercise Name 1 hooklying clam with green TB   -KR      Sets 1 2  -KR      Reps 1 10  -KR      Time (Seconds) 1 10  -KR      Exercise 2    Exercise Name 2 SL mini bridge  -KR      Sets 2 2  -KR      Reps 2 10  -KR      Exercise 3    Exercise Name 3 bird dog   -KR      Cueing 3 Verbal;Tactile  -KR      Sets 3 2  -KR      Reps 3 10  -KR        User Key  (r) = Recorded By, (t) = Taken By, (c) = Cosigned By    Initials Name Provider Type    ANDRIY Morley, PT Physical Therapist                        Manual Rx (last 36 hours)      Manual Treatments       02/16/17 1434          Manual Rx 1    Manual Rx 1 Location hooklying R lateral/inferior belt hip distraction  -KR      Manual Rx 1 Grade 2  -KR      Manual Rx 1 Duration 5  -KR      Manual Rx 2    Manual Rx 2 Location prone STM to R lumbar paraspinals  -KR      Manual Rx 2 Grade min-mod  -KR      Manual Rx 2 Duration 5  -KR        User Key  (r) = Recorded By, (t) = Taken By, (c) = Cosigned By    Initials Name Provider Type    ANDRIY Morley, PT Physical Therapist                PT OP Goals       02/16/17 1434 02/14/17 1500 02/14/17 1428    PT Short Term Goals    STG Date to Achieve 03/01/17  -KR 03/01/17  -EC     STG 1 Show symmetrical jeaneth pelvis and leg lengths  -KR Show symmetrical jeaneth pelvis and leg lengths   -EC     STG 1 Progress Progressing  -KR Progressing  -EC     STG 1 Progress Comments equal   -KR      STG 2 No radicular symptoms below her knee  -KR No radicular symptoms below her knee  -EC     STG 2 Progress Ongoing  -KR Ongoing  -EC     STG 2 Progress Comments none today  -KR she continues to have symptoms that intermittently change  -EC     Long Term Goals    LTG Date to Achieve 03/22/17  -KR 03/22/17  -EC     LTG 1 Reports no pain with functional activities at home except occasional minor twinges no more than 1-2/10  -KR Reports no pain with functional activities at home except occasional minor twinges no more than 1-2/10  -EC     LTG 1 Progress Progressing  -KR Progressing  -EC     LTG 1 Progress Comments 3/10   -KR      LTG 2 Independent with HEP for core stability and flexibility  -KR Independent with HEP for core stability and flexibility  -EC     LTG 2 Progress Progressing  -KR Progressing  -EC     LTG 3 Full flexibility of hips in all planes with no pain  -KR Full flexibility of hips in all planes with no pain  -EC     LTG 3 Progress Progressing  -KR Progressing  -EC     LTG 3 Progress Comments IR 19 R 28  -KR      Time Calculation    PT Goal Re-Cert Due Date 03/08/17  -KR 03/08/17  -EC 03/08/17  -EC      02/14/17 1400 02/10/17 1430 02/08/17 1433    PT Short Term Goals    STG Date to Achieve 03/01/17  -EC 03/01/17  -AL 03/01/17  -TC    STG 1 Show symmetrical jeaneth pelvis and leg lengths  -EC Show symmetrical jeaneth pelvis and leg lengths  -AL Show symmetrical jeaneth pelvis and leg lengths  -TC    STG 1 Progress Progressing  -EC Progressing  -AL Progressing  -TC    STG 1 Progress Comments   Demonstrates slight upslip still on the right   -TC    STG 2 No radicular symptoms below her knee  -EC No radicular symptoms below her knee  -AL No radicular symptoms below her knee  -TC    STG 2 Progress Ongoing  -EC Ongoing  -AL Ongoing  -TC    STG 2 Progress Comments  Had pain below R knee durning STM today, and also during  exercises on the ball  -AL she reports nothing past the knee over the weekend; but has had intense calf spasms after last session   -TC    Long Term Goals    LTG Date to Achieve 03/22/17  -EC 03/22/17  -AL 03/22/17  -TC    LTG 1 Reports no pain with functional activities at home except occasional minor twinges no more than 1-2/10  -EC Reports no pain with functional activities at home except occasional minor twinges no more than 1-2/10  -AL Reports no pain with functional activities at home except occasional minor twinges no more than 1-2/10  -TC    LTG 1 Progress Progressing  -EC Progressing  -AL Progressing  -TC    LTG 1 Progress Comments   still complaining of pain with lifting, carrying, cleaning, sleeping and other daily activities   -TC    LTG 2 Independent with HEP for core stability and flexibility  -EC Independent with HEP for core stability and flexibility  -AL Independent with HEP for core stability and flexibility  -TC    LTG 2 Progress Progressing  -EC Progressing  -AL Progressing  -TC    LTG 2 Progress Comments  She is working on her HEp  -AL she reports she has been more compliant   -TC    LTG 3 Full flexibility of hips in all planes with no pain  -EC Full flexibility of hips in all planes with no pain  -AL Full flexibility of hips in all planes with no pain  -TC    LTG 3 Progress Progressing  -EC Progressing  -AL Progressing  -TC    LTG 3 Progress Comments   still limited and painful with R hip IR   -TC    Time Calculation    PT Goal Re-Cert Due Date  03/08/17  -AL 03/08/17  -TC      02/03/17 1500 02/03/17 1430       PT Short Term Goals    STG Date to Achieve 01/30/17  -EC      STG 1 Show symmetrical jeaneth pelvis and leg lengths  -EC      STG 1 Progress Ongoing  -EC      STG 2 No radicular symptoms below her knee  -EC      STG 2 Progress Ongoing  -EC      STG 2 Progress Comments continues to report intermittent radicular symptoms  -EC      Long Term Goals    LTG Date to Achieve 02/20/17  -EC      LTG 1  Reports no pain with functional activities at home except occasional minor twinges no more than 1-2/10  -EC      LTG 1 Progress Ongoing  -EC      LTG 2 Independent with HEP for core stability and flexibility  -EC      LTG 2 Progress Ongoing  -EC      LTG 3 Full flexibility of hips in all planes with no pain  -EC      LTG 3 Progress Ongoing  -EC      LTG 3 Progress Comments R hip IR improved but c/o tightness and discomfort  -EC      Time Calculation    PT Goal Re-Cert Due Date  02/08/17  -EC       User Key  (r) = Recorded By, (t) = Taken By, (c) = Cosigned By    Initials Name Provider Type    EC Michele Montero, PTA Physical Therapy Assistant    BOBBY Vargas, [facility] Physical Therapy Assistant    ANDRIY Morley, PT Physical Therapist    AGUSTO Jewell, PT Physical Therapist                Therapy Education       02/16/17 1434    Therapy Education    Given Symptoms/condition management;Posture/body mechanics  -KR    Program Reinforced  -KR    How Provided Verbal  -KR    Provided to Patient  -KR    Level of Understanding Verbalized  -KR      02/14/17 1523    Therapy Education    Given Symptoms/condition management  -EC    How Provided Verbal  -EC    Provided to Patient  -EC    Level of Understanding Verbalized  -EC      02/10/17 1430    Therapy Education    Given HEP  -AL    Program Reinforced  -AL    How Provided Verbal  -AL    Provided to Patient  -AL    Level of Understanding Verbalized  -AL      User Key  (r) = Recorded By, (t) = Taken By, (c) = Cosigned By    Initials Name Provider Type    EC Michele Montero, PTA Physical Therapy Assistant    BOBBY Vargas, PTA Physical Therapy Assistant    ANDRIY Morley, PT Physical Therapist                Time Calculation:   Start Time: 1434  Stop Time: 1519  Time Calculation (min): 45 min  Total Timed Code Minutes- PT: 43 minute(s)    Therapy Charges for Today     Code Description Service Date Service Provider Modifiers Qty    [id number]  PT  MANUAL THERAPY EA 15 MIN 2/16/2017 Anh Morley, PT GP 1    [id number]  PT THER PROC EA 15 MIN 2/16/2017 Anh Morley, PT GP 2                    Anh Morley, PT  2/16/2017

## 2017-02-21 ENCOUNTER — OFFICE VISIT (OUTPATIENT)
Dept: URGENT CARE | Age: 27
End: 2017-02-21
Payer: MEDICAID

## 2017-02-21 VITALS
HEIGHT: 63 IN | DIASTOLIC BLOOD PRESSURE: 81 MMHG | TEMPERATURE: 98.9 F | RESPIRATION RATE: 18 BRPM | WEIGHT: 116 LBS | OXYGEN SATURATION: 99 % | HEART RATE: 75 BPM | BODY MASS INDEX: 20.55 KG/M2 | SYSTOLIC BLOOD PRESSURE: 120 MMHG

## 2017-02-21 DIAGNOSIS — R10.9 FLANK PAIN: ICD-10-CM

## 2017-02-21 DIAGNOSIS — N30.00 ACUTE CYSTITIS WITHOUT HEMATURIA: Primary | ICD-10-CM

## 2017-02-21 LAB
APPEARANCE FLUID: ABNORMAL
BILIRUBIN, POC: NEGATIVE
BLOOD URINE, POC: NEGATIVE
CLARITY, POC: CLEAR
COLOR, POC: YELLOW
GLUCOSE URINE, POC: NEGATIVE
KETONES, POC: NEGATIVE
LEUKOCYTE EST, POC: ABNORMAL
NITRITE, POC: NEGATIVE
PH, POC: 7
PROTEIN, POC: NEGATIVE
SPECIFIC GRAVITY, POC: 1.02
UROBILINOGEN, POC: 0.2

## 2017-02-21 PROCEDURE — 81002 URINALYSIS NONAUTO W/O SCOPE: CPT | Performed by: NURSE PRACTITIONER

## 2017-02-21 PROCEDURE — 96372 THER/PROPH/DIAG INJ SC/IM: CPT | Performed by: NURSE PRACTITIONER

## 2017-02-21 PROCEDURE — 99202 OFFICE O/P NEW SF 15 MIN: CPT | Performed by: NURSE PRACTITIONER

## 2017-02-21 RX ORDER — CEFTRIAXONE 500 MG/1
500 INJECTION, POWDER, FOR SOLUTION INTRAMUSCULAR; INTRAVENOUS ONCE
Status: COMPLETED | OUTPATIENT
Start: 2017-02-21 | End: 2017-02-21

## 2017-02-21 RX ORDER — PHENAZOPYRIDINE HYDROCHLORIDE 100 MG/1
100 TABLET, FILM COATED ORAL 3 TIMES DAILY PRN
Qty: 9 TABLET | Refills: 0 | Status: SHIPPED | OUTPATIENT
Start: 2017-02-21 | End: 2017-04-14

## 2017-02-21 RX ORDER — CIPROFLOXACIN 500 MG/1
500 TABLET, FILM COATED ORAL 2 TIMES DAILY
Qty: 20 TABLET | Refills: 0 | Status: SHIPPED | OUTPATIENT
Start: 2017-02-21 | End: 2017-03-03

## 2017-02-21 RX ADMIN — CEFTRIAXONE 500 MG: 500 INJECTION, POWDER, FOR SOLUTION INTRAMUSCULAR; INTRAVENOUS at 13:12

## 2017-02-21 ASSESSMENT — ENCOUNTER SYMPTOMS: NAUSEA: 1

## 2017-02-22 ENCOUNTER — HOSPITAL ENCOUNTER (OUTPATIENT)
Dept: PHYSICAL THERAPY | Facility: HOSPITAL | Age: 27
Setting detail: THERAPIES SERIES
Discharge: HOME | End: 2017-02-22

## 2017-02-22 DIAGNOSIS — S33.6XXA SACROILIAC (LIGAMENT) SPRAIN, INITIAL ENCOUNTER: ICD-10-CM

## 2017-02-22 DIAGNOSIS — M54.42 BILATERAL LOW BACK PAIN WITH BILATERAL SCIATICA, UNSPECIFIED CHRONICITY: Primary | ICD-10-CM

## 2017-02-22 DIAGNOSIS — M54.41 BILATERAL LOW BACK PAIN WITH BILATERAL SCIATICA, UNSPECIFIED CHRONICITY: Primary | ICD-10-CM

## 2017-02-22 PROCEDURE — 97110 THERAPEUTIC EXERCISES: CPT

## 2017-02-22 NOTE — PROGRESS NOTES
Outpatient Physical Therapy Ortho Treatment Note  [facility]     Patient Name: Laura Morales  : 1990  MRN: [id number]  Today's Date: 2017      Visit Date: 2017    Visit Dx:    ICD-10-CM ICD-9-CM   1. Bilateral low back pain with bilateral sciatica, unspecified chronicity M54.42 724.3    M54.41    2. Sacroiliac (ligament) sprain, initial encounter S33.6XXA 846.1       There is no problem list on file for this patient.       Past Medical History   Diagnosis Date   • Fracture of rib of left side         Past Surgical History   Procedure Laterality Date   • Cystectomy Right 2014     right breast                             PT Assessment/Plan       17 1423 17 1434       PT Assessment    Assessment Comments She has a UTI and kidney infection, she is not feeling well.  She is trying to work on her HEP and psoture  She starts to fatigue towards the end of the treatment, and will lose her balance more on the ball.    -AL Today she presents with less pain. She did have guarding along her R lumbar paraspinals that was painful. She needed cues for glut/core activation with bridging and bird dog activity, demonstrating decreased core/hip strength. Her right hip IR was more limited than L.   -KR     PT Plan    PT Plan Comments Continue to progress with core and hip strengthening.   -AL Continue to work on lumbosacral/hip mobiltiy and progress with core/hip strengthening.   -KR       User Key  (r) = Recorded By, (t) = Taken By, (c) = Cosigned By    Initials Name Provider Type    BOBBY Vargas, PTA Physical Therapy Assistant    ANDRIY Morley, PT DPT Physical Therapist                    Exercises       17 1425          Subjective Comments    Subjective Comments She has a UTI and kidney stones.  She is not feeling well, she is on medication for this. She rates her back pain 6/10.  She has a cervical roll now, and this has helped her sleep.   -AL      Subjective Pain    Able  to rate subjective pain? yes  -AL      Pre-Treatment Pain Level 6  -AL      Post-Treatment Pain Level 6   SI area huring the most.  -AL      Subjective Pain Comment Low back and SI area.   -AL      Exercise 1    Exercise Name 1 hooklying clam with green TB   -AL      Sets 1 2  -AL      Reps 1 15  -AL      Exercise 2    Exercise Name 2 Bridge  -AL      Sets 2 2  -AL      Reps 2 15  -AL      Exercise 3    Exercise Name 3 bird dog   -AL      Cueing 3 Verbal;Tactile  -AL      Sets 3 2  -AL      Reps 3 10  -AL      Exercise 4    Exercise Name 4 plank  -AL      Reps 4 5  -AL      Time (Seconds) 4 15 seconds  -AL      Exercise 5    Exercise Name 5 Prayer stretch   ocassionally throughout treatment.  -AL      Sets 5 5  -AL      Exercise 6    Exercise Name 6 Bridges with hip abduction  -AL      Resistance 6 Green  -AL      Sets 6 2  -AL      Reps 6 15  -AL      Exercise 7    Exercise Name 7 Exercise ball LAQ  -AL      Sets 7 2  -AL      Reps 7 15  -AL      Exercise 8    Exercise Name 8 Exercise ball marching  -AL      Sets 8 2  -AL      Reps 8 15  -AL      Exercise 9    Exercise Name 9 Exercise ball alternating arm/leg lifts  -AL      Sets 9 2  -AL      Reps 9 15  -AL      Exercise 10    Exercise Name 10 Wall squats with ball behind her  -AL      Sets 10 2  -AL      Reps 10 10  -AL      Exercise 11    Exercise Name 11 On exercise ball UE phasic  -AL      Resistance 11 Green  -AL      Sets 11 2  -AL      Reps 11 15  -AL        User Key  (r) = Recorded By, (t) = Taken By, (c) = Cosigned By    Initials Name Provider Type    BOBBY Vargas PTA Physical Therapy Assistant                               PT OP Goals       02/22/17 1519 02/22/17 1423 02/16/17 1434    PT Short Term Goals    STG Date to Achieve  03/01/17  -AL 03/01/17  -KR    STG 1  Show symmetrical jeaneth pelvis and leg lengths  -AL Show symmetrical jeaneth pelvis and leg lengths  -KR    STG 1 Progress  Progressing  -AL Progressing  -KR    STG 1 Progress Comments   equal    -KR    STG 2  No radicular symptoms below her knee  -AL No radicular symptoms below her knee  -KR    STG 2 Progress  Ongoing  -AL Ongoing  -KR    STG 2 Progress Comments  Has had occasional pain below her knee and numbness, this comes and goes  -AL none today  -KR    Long Term Goals    LTG Date to Achieve  03/22/17  -AL 03/22/17  -KR    LTG 1  Reports no pain with functional activities at home except occasional minor twinges no more than 1-2/10  -AL Reports no pain with functional activities at home except occasional minor twinges no more than 1-2/10  -KR    LTG 1 Progress  Progressing  -AL Progressing  -KR    LTG 1 Progress Comments  Has pain anytime she works on chores at home  -AL 3/10   -KR    LTG 2  Independent with HEP for core stability and flexibility  -AL Independent with HEP for core stability and flexibility  -KR    LTG 2 Progress  Progressing  -AL Progressing  -KR    LTG 2 Progress Comments  She is working on her HEP  -AL     LTG 3  Full flexibility of hips in all planes with no pain  -AL Full flexibility of hips in all planes with no pain  -KR    LTG 3 Progress  Progressing  -AL Progressing  -KR    LTG 3 Progress Comments   IR 19 R 28  -KR    Time Calculation    PT Goal Re-Cert Due Date 03/08/17  -AL  03/08/17  -KR      02/14/17 1500 02/14/17 1428 02/14/17 1400    PT Short Term Goals    STG Date to Achieve 03/01/17  -EC  03/01/17  -EC    STG 1 Show symmetrical jeaneth pelvis and leg lengths  -EC  Show symmetrical jeaneth pelvis and leg lengths  -EC    STG 1 Progress Progressing  -EC  Progressing  -EC    STG 2 No radicular symptoms below her knee  -EC  No radicular symptoms below her knee  -EC    STG 2 Progress Ongoing  -EC  Ongoing  -EC    STG 2 Progress Comments she continues to have symptoms that intermittently change  -EC      Long Term Goals    LTG Date to Achieve 03/22/17  -EC  03/22/17  -EC    LTG 1 Reports no pain with functional activities at home except occasional minor twinges no more than 1-2/10  -EC   Reports no pain with functional activities at home except occasional minor twinges no more than 1-2/10  -EC    LTG 1 Progress Progressing  -EC  Progressing  -EC    LTG 2 Independent with HEP for core stability and flexibility  -EC  Independent with HEP for core stability and flexibility  -EC    LTG 2 Progress Progressing  -EC  Progressing  -EC    LTG 3 Full flexibility of hips in all planes with no pain  -EC  Full flexibility of hips in all planes with no pain  -EC    LTG 3 Progress Progressing  -EC  Progressing  -EC    Time Calculation    PT Goal Re-Cert Due Date 03/08/17  -EC 03/08/17  -EC       02/10/17 1430          PT Short Term Goals    STG Date to Achieve 03/01/17  -AL      STG 1 Show symmetrical jeaneth pelvis and leg lengths  -AL      STG 1 Progress Progressing  -AL      STG 2 No radicular symptoms below her knee  -AL      STG 2 Progress Ongoing  -AL      STG 2 Progress Comments Had pain below R knee durning STM today, and also during exercises on the ball  -AL      Long Term Goals    LTG Date to Achieve 03/22/17  -AL      LTG 1 Reports no pain with functional activities at home except occasional minor twinges no more than 1-2/10  -AL      LTG 1 Progress Progressing  -AL      LTG 2 Independent with HEP for core stability and flexibility  -AL      LTG 2 Progress Progressing  -AL      LTG 2 Progress Comments She is working on her HEp  -AL      LTG 3 Full flexibility of hips in all planes with no pain  -AL      LTG 3 Progress Progressing  -AL      Time Calculation    PT Goal Re-Cert Due Date 03/08/17  -AL        User Key  (r) = Recorded By, (t) = Taken By, (c) = Cosigned By    Initials Name Provider Type    EC Michele Montero, PTA Physical Therapy Assistant    BOBBY Vargas, PTA Physical Therapy Assistant    ANDRIY Morley, PT DPT Physical Therapist                Therapy Education       02/22/17 1423    Therapy Education    Given HEP;Posture/body mechanics  -AL    Program Reinforced  -AL    How  Provided Verbal  -AL    Provided to Patient  -AL    Level of Understanding Verbalized  -AL      02/16/17 1434    Therapy Education    Given Symptoms/condition management;Posture/body mechanics  -KR    Program Reinforced  -KR    How Provided Verbal  -KR    Provided to Patient  -KR    Level of Understanding Verbalized  -KR      User Key  (r) = Recorded By, (t) = Taken By, (c) = Cosigned By    Initials Name Provider Type    AL Sapna Vargas PTA Physical Therapy Assistant    ANDRIY Morley, PT DPT Physical Therapist                Time Calculation:   Start Time: 1423  Stop Time: 1510  Time Calculation (min): 47 min  Total Timed Code Minutes- PT: 47 minute(s)    Therapy Charges for Today     Code Description Service Date Service Provider Modifiers Qty    04629171348 HC PT THER PROC EA 15 MIN 2/22/2017 Sapna Vargas PTA GP 3                    Sapna Vargas PTA  2/22/2017

## 2017-03-01 ENCOUNTER — APPOINTMENT (OUTPATIENT)
Dept: PHYSICAL THERAPY | Facility: HOSPITAL | Age: 27
End: 2017-03-01

## 2017-03-01 ENCOUNTER — HOSPITAL ENCOUNTER (OUTPATIENT)
Dept: PHYSICAL THERAPY | Facility: HOSPITAL | Age: 27
Setting detail: THERAPIES SERIES
Discharge: HOME | End: 2017-03-01

## 2017-03-01 DIAGNOSIS — S33.6XXA SACROILIAC (LIGAMENT) SPRAIN, INITIAL ENCOUNTER: ICD-10-CM

## 2017-03-01 DIAGNOSIS — M54.42 BILATERAL LOW BACK PAIN WITH BILATERAL SCIATICA, UNSPECIFIED CHRONICITY: Primary | ICD-10-CM

## 2017-03-01 DIAGNOSIS — M54.41 BILATERAL LOW BACK PAIN WITH BILATERAL SCIATICA, UNSPECIFIED CHRONICITY: Primary | ICD-10-CM

## 2017-03-01 PROCEDURE — 97110 THERAPEUTIC EXERCISES: CPT

## 2017-03-01 PROCEDURE — 97140 MANUAL THERAPY 1/> REGIONS: CPT

## 2017-03-01 NOTE — PROGRESS NOTES
Outpatient Physical Therapy Ortho Treatment Note  [facility]     Patient Name: Laura Morales  : 1990  MRN: [id number]  Today's Date: 3/1/2017      Visit Date: 2017    Visit Dx:    ICD-10-CM ICD-9-CM   1. Bilateral low back pain with bilateral sciatica, unspecified chronicity M54.42 724.3    M54.41    2. Sacroiliac (ligament) sprain, initial encounter S33.6XXA 846.1       There is no problem list on file for this patient.       Past Medical History   Diagnosis Date   • Fracture of rib of left side         Past Surgical History   Procedure Laterality Date   • Cystectomy Right 2014     right breast                             PT Assessment/Plan       17    PT Assessment    Assessment Comments She has still had pain that is constant.  She is  feeling better now tht she has passed her kidney stones.  I am hoping this will be a better week for her since she is feeling better from her kidney stones.  Will try to progress HEP next visit.  She does not loose her balance as much on the ball exercises now, will add these to her HEP  -AL --  -AL    PT Plan    PT Plan Comments Will continue working on core strengthneing.  -AL --  -AL      User Key  (r) = Recorded By, (t) = Taken By, (c) = Cosigned By    Initials Name Provider Type    BOBBY Vargas PTA Physical Therapy Assistant                    Exercises       17          Subjective Comments    Subjective Comments She passed a kidney stone last week, that's why she was not able to come for treatment.  She has been able to do her exercises over the weekend.  Rates her pain 5/10 R low back.  Almost feels like it's locked.    -AL      Subjective Pain    Able to rate subjective pain? yes  -AL      Pre-Treatment Pain Level 5  -AL      Post-Treatment Pain Level 5  -AL      Subjective Pain Comment Low back and SI area.   -AL      Exercise 2    Exercise Name 2 Multfidous strengthening  -AL      Sets 2 2  -AL      Reps 2  5  -AL      Exercise 5    Exercise Name 5 Prayer stretch   ocassionally throughout treatment.  -AL      Sets 5 5  -AL      Exercise 7    Exercise Name 7 Exercise ball LAQ  -AL      Sets 7 2  -AL      Reps 7 15  -AL      Exercise 8    Exercise Name 8 Exercise ball marching  -AL      Sets 8 2  -AL      Reps 8 15  -AL      Exercise 9    Exercise Name 9 Exercise ball alternating arm/leg lifts  -AL      Sets 9 2  -AL      Reps 9 15  -AL      Exercise 10    Exercise Name 10 Wall squats with ball behind her  -AL      Sets 10 2  -AL      Reps 10 10  -AL        User Key  (r) = Recorded By, (t) = Taken By, (c) = Cosigned By    Initials Name Provider Type    BOBBY Vargas PTA Physical Therapy Assistant                        Manual Rx (last 36 hours)      Manual Treatments       03/01/17 1427          Manual Rx 1    Manual Rx 1 Location Prone extension mobs to the thoracic and lumbar spine  -AL      Manual Rx 1 Grade 1  -AL      Manual Rx 1 Duration 3 min  -AL      Manual Rx 2    Manual Rx 2 Location prone STM to R lumbar paraspinals  -AL      Manual Rx 2 Grade min-mod  -AL      Manual Rx 2 Duration 10  -AL        User Key  (r) = Recorded By, (t) = Taken By, (c) = Cosigned By    Initials Name Provider Type    BOBBY Vargas PTA Physical Therapy Assistant                PT OP Goals       03/01/17 1427 03/01/17 1423    PT Short Term Goals    STG Date to Achieve  03/01/17  -AL    STG 1  Show symmetrical jeaneth pelvis and leg lengths  -AL    STG 1 Progress  Progressing  -AL    STG 2  No radicular symptoms below her knee  -AL    STG 2 Progress  Ongoing  -AL    STG 2 Progress Comments  Still has occasional symptoms below R lateral lower leg and into the R side of her R foot into her toes  -AL    Long Term Goals    LTG Date to Achieve  03/22/17  -AL    LTG 1  Reports no pain with functional activities at home except occasional minor twinges no more than 1-2/10  -AL    LTG 1 Progress  Progressing  -AL    LTG 1 Progress Comments   Still has constant back pain, it is better but always present  -AL    LTG 2  Independent with HEP for core stability and flexibility  -AL    LTG 2 Progress  Progressing  -AL    LTG 2 Progress Comments  She is working on her HEP  -AL    LTG 3  Full flexibility of hips in all planes with no pain  -AL    LTG 3 Progress  Progressing  -AL    Time Calculation    PT Goal Re-Cert Due Date 03/08/17  -AL 03/08/17  -AL      User Key  (r) = Recorded By, (t) = Taken By, (c) = Cosigned By    Initials Name Provider Type    BOBBY Vargas PTA Physical Therapy Assistant                Therapy Education       03/01/17 1427          Therapy Education    Given HEP  -AL      Program Reinforced  -AL      How Provided Verbal  -AL      Provided to Patient  -AL      Level of Understanding Verbalized  -AL        User Key  (r) = Recorded By, (t) = Taken By, (c) = Cosigned By    Initials Name Provider Type    BOBBY Vargas PTA Physical Therapy Assistant                Time Calculation:   Start Time: 1427  Stop Time: 1515  Time Calculation (min): 48 min  Total Timed Code Minutes- PT: 48 minute(s)    Therapy Charges for Today     Code Description Service Date Service Provider Modifiers Qty    44610011222 HC PT MANUAL THERAPY EA 15 MIN 3/1/2017 Sapna Vargas PTA GP 1    44410473769 HC PT THER PROC EA 15 MIN 3/1/2017 Sapna Vargas PTA GP 2                    Sapna Vargas PTA  3/1/2017

## 2017-03-03 ENCOUNTER — HOSPITAL ENCOUNTER (OUTPATIENT)
Dept: PHYSICAL THERAPY | Facility: HOSPITAL | Age: 27
Setting detail: THERAPIES SERIES
Discharge: HOME | End: 2017-03-03

## 2017-03-03 DIAGNOSIS — M54.41 BILATERAL LOW BACK PAIN WITH BILATERAL SCIATICA, UNSPECIFIED CHRONICITY: Primary | ICD-10-CM

## 2017-03-03 DIAGNOSIS — S33.6XXA SACROILIAC (LIGAMENT) SPRAIN, INITIAL ENCOUNTER: ICD-10-CM

## 2017-03-03 DIAGNOSIS — M54.42 BILATERAL LOW BACK PAIN WITH BILATERAL SCIATICA, UNSPECIFIED CHRONICITY: Primary | ICD-10-CM

## 2017-03-03 PROCEDURE — 97140 MANUAL THERAPY 1/> REGIONS: CPT

## 2017-03-03 PROCEDURE — 97110 THERAPEUTIC EXERCISES: CPT

## 2017-03-06 NOTE — PROGRESS NOTES
Outpatient Physical Therapy Ortho Treatment Note  [facility]     Patient Name: Laura Morales  : 1990  MRN: [id number]  Today's Date: 3/6/2017      Visit Date: 2017    Visit Dx:    ICD-10-CM ICD-9-CM   1. Bilateral low back pain with bilateral sciatica, unspecified chronicity M54.42 724.3    M54.41    2. Sacroiliac (ligament) sprain, initial encounter S33.6XXA 846.1       There is no problem list on file for this patient.       Past Medical History   Diagnosis Date   • Fracture of rib of left side         Past Surgical History   Procedure Laterality Date   • Cystectomy Right 2014     right breast           17 1417   PT Assessment   Assessment Comments She demonstrated improved hip ROM today that was painfree. She still demonstrated lumbar guarding and SIJ pain on the right. Her radicular symptoms are improving but her back pain is still present and intermittently worse than other times. Her mid thoracic spine continues to demonstrate hypomobility and hypermobility at lower thoracic and LS segment. This along with continued core and hip weakness exacerbate her symptoms.     PT Plan   PT Plan Comments Continue to address her mobility impairments, hip ROM and core/hip strength avoiding any increase in her radicular symptoms.          17 1417   Subjective Comments   Subjective Comments Pt reports that her back pain is still present. Constant burning senstion still in her back and buttock that never really goes away. She has had sharp pains from neck down.     Subjective Pain   Able to rate subjective pain? yes   Pre-Treatment Pain Level 5   Post-Treatment Pain Level 4   Subjective Pain Comment Low back and SI    Exercise 1   Exercise Name 1 planks on 55cm physioball at chest     Cueing 1 Verbal;Tactile;Demo   Reps 1 5   Time (Seconds) 1 30s holds    Exercise 2   Exercise Name 2 planks on BOSU at feet    Reps 2 3   Time (Seconds) 2 15s   Exercise 3   Exercise Name 3 seated position on  75cm physioball walk outs    Cueing 3 Verbal;Tactile;Demo   Sets 3 1   Reps 3 8   Exercise 4   Exercise Name 4 standing on flat side of BOSU midline oient     Cueing 4 Demo   Exercise 5   Exercise Name 5 mini squats on BOSU    Cueing 5 Demo   Sets 5 2   Reps 5 5   Exercise 6   Exercise Name 6 resisted obliques; sustained holds   (Cybex 20lb resistance B)   Cueing 6 Demo   Sets 6 1   Reps 6 10        03/03/17 1417   Manual Rx 1   Manual Rx 1 Location Prone extension mobs to the thoracic and lumbar spine   Manual Rx 1 Grade 1   Manual Rx 1 Duration 3 min   Manual Rx 2   Manual Rx 2 Location prone STM to R lumbar paraspinals   Manual Rx 2 Grade min-mod   Manual Rx 2 Duration 10   Manual Rx 3   Manual Rx 3 Location upper lumbar PAs   Manual Rx 3 Duration 5   Manual Rx 4   Manual Rx 4 Location prone thoracic upper and mid    Manual Rx 4 Type cavitation noted at T8 and T4-5 with relief reported afterwards    Manual Rx 4 Grade 3   Manual Rx 4 Duration 5   Manual Rx 5   Manual Rx 5 Location LAD to R hip/SI   Manual Rx 5 Duration 5        03/03/17 1417   PT Short Term Goals   STG Date to Achieve 03/01/17   STG 1 Show symmetrical jeaneth pelvis and leg lengths   STG 1 Progress Progressing   STG 2 No radicular symptoms below her knee   STG 2 Progress Ongoing   STG 2 Progress Comments still reports some intermittent radicular symptoms into legs    Long Term Goals   LTG Date to Achieve 03/22/17   LTG 1 Reports no pain with functional activities at home except occasional minor twinges no more than 1-2/10   LTG 1 Progress Progressing   LTG 2 Independent with HEP for core stability and flexibility   LTG 2 Progress Progressing   LTG 3 Full flexibility of hips in all planes with no pain   LTG 3 Progress Progressing   LTG 3 Progress Comments she demonstrated full hip ROM today without any sharp pain    Time Calculation   PT Goal Re-Cert Due Date 03/08/17                                                        Time Calculation:   Start  Time: 1417  Stop Time: 1503  Time Calculation (min): 46 min  Total Timed Code Minutes- PT: 46 minute(s)                  Vianca Jewell, PT  3/6/2017

## 2017-03-08 ENCOUNTER — HOSPITAL ENCOUNTER (OUTPATIENT)
Dept: PHYSICAL THERAPY | Facility: HOSPITAL | Age: 27
Setting detail: THERAPIES SERIES
Discharge: HOME | End: 2017-03-08

## 2017-03-08 DIAGNOSIS — M54.41 BILATERAL LOW BACK PAIN WITH BILATERAL SCIATICA, UNSPECIFIED CHRONICITY: Primary | ICD-10-CM

## 2017-03-08 DIAGNOSIS — M54.42 BILATERAL LOW BACK PAIN WITH BILATERAL SCIATICA, UNSPECIFIED CHRONICITY: Primary | ICD-10-CM

## 2017-03-08 DIAGNOSIS — S33.6XXA SACROILIAC (LIGAMENT) SPRAIN, INITIAL ENCOUNTER: ICD-10-CM

## 2017-03-08 PROCEDURE — 97140 MANUAL THERAPY 1/> REGIONS: CPT

## 2017-03-08 PROCEDURE — 97110 THERAPEUTIC EXERCISES: CPT
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
## 2017-03-10 ENCOUNTER — HOSPITAL ENCOUNTER (OUTPATIENT)
Dept: PHYSICAL THERAPY | Facility: HOSPITAL | Age: 27
Setting detail: THERAPIES SERIES
Discharge: HOME | End: 2017-03-10

## 2017-03-10 DIAGNOSIS — M54.41 BILATERAL LOW BACK PAIN WITH BILATERAL SCIATICA, UNSPECIFIED CHRONICITY: Primary | ICD-10-CM

## 2017-03-10 DIAGNOSIS — M54.42 BILATERAL LOW BACK PAIN WITH BILATERAL SCIATICA, UNSPECIFIED CHRONICITY: Primary | ICD-10-CM

## 2017-03-10 DIAGNOSIS — S33.6XXA SACROILIAC (LIGAMENT) SPRAIN, INITIAL ENCOUNTER: ICD-10-CM

## 2017-03-10 PROCEDURE — 97110 THERAPEUTIC EXERCISES: CPT

## 2017-03-10 PROCEDURE — 97140 MANUAL THERAPY 1/> REGIONS: CPT
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
## 2017-04-12 ENCOUNTER — DOCUMENTATION (OUTPATIENT)
Dept: PHYSICAL THERAPY | Facility: HOSPITAL | Age: 27
End: 2017-04-12

## 2017-04-12 DIAGNOSIS — M54.42 BILATERAL LOW BACK PAIN WITH BILATERAL SCIATICA, UNSPECIFIED CHRONICITY: Primary | ICD-10-CM

## 2017-04-12 DIAGNOSIS — M54.41 BILATERAL LOW BACK PAIN WITH BILATERAL SCIATICA, UNSPECIFIED CHRONICITY: Primary | ICD-10-CM

## 2017-04-12 DIAGNOSIS — S33.6XXA SACROILIAC (LIGAMENT) SPRAIN, INITIAL ENCOUNTER: ICD-10-CM
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
## 2017-04-14 ENCOUNTER — OFFICE VISIT (OUTPATIENT)
Dept: URGENT CARE | Age: 27
End: 2017-04-14
Payer: MEDICAID

## 2017-04-14 VITALS
WEIGHT: 115 LBS | OXYGEN SATURATION: 98 % | HEIGHT: 62 IN | SYSTOLIC BLOOD PRESSURE: 114 MMHG | DIASTOLIC BLOOD PRESSURE: 78 MMHG | RESPIRATION RATE: 18 BRPM | BODY MASS INDEX: 21.16 KG/M2 | TEMPERATURE: 98.9 F | HEART RATE: 106 BPM

## 2017-04-14 DIAGNOSIS — K52.9 GASTROENTERITIS: ICD-10-CM

## 2017-04-14 DIAGNOSIS — R68.89 FLU-LIKE SYMPTOMS: Primary | ICD-10-CM

## 2017-04-14 LAB
INFLUENZA A ANTIBODY: NEGATIVE
INFLUENZA B ANTIBODY: NEGATIVE

## 2017-04-14 PROCEDURE — 99213 OFFICE O/P EST LOW 20 MIN: CPT | Performed by: NURSE PRACTITIONER

## 2017-04-14 PROCEDURE — 87804 INFLUENZA ASSAY W/OPTIC: CPT | Performed by: NURSE PRACTITIONER

## 2017-04-14 RX ORDER — DEXTROAMPHETAMINE SACCHARATE, AMPHETAMINE ASPARTATE MONOHYDRATE, DEXTROAMPHETAMINE SULFATE AND AMPHETAMINE SULFATE 5; 5; 5; 5 MG/1; MG/1; MG/1; MG/1
20 CAPSULE, EXTENDED RELEASE ORAL EVERY MORNING
COMMUNITY

## 2017-04-14 ASSESSMENT — ENCOUNTER SYMPTOMS
WHEEZING: 0
DIARRHEA: 1
ALLERGIC/IMMUNOLOGIC NEGATIVE: 1
ABDOMINAL PAIN: 0
EYES NEGATIVE: 1
CONSTIPATION: 0
RESPIRATORY NEGATIVE: 1
TROUBLE SWALLOWING: 0
VOMITING: 1
NAUSEA: 1
EYE REDNESS: 0
SORE THROAT: 0
ABDOMINAL DISTENTION: 0
SINUS PRESSURE: 0
SHORTNESS OF BREATH: 0
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
## 2017-07-29 ENCOUNTER — APPOINTMENT (OUTPATIENT)
Dept: LAB | Facility: HOSPITAL | Age: 27
End: 2017-07-29

## 2017-07-29 ENCOUNTER — TRANSCRIBE ORDERS (OUTPATIENT)
Dept: ADMINISTRATIVE | Facility: HOSPITAL | Age: 27
End: 2017-07-29

## 2017-07-29 DIAGNOSIS — N64.82 CONGENITAL HYPOPLASIA OF BREAST: ICD-10-CM

## 2017-07-29 DIAGNOSIS — Z01.812 PRE-OPERATIVE LABORATORY EXAMINATION: Primary | ICD-10-CM

## 2017-07-29 LAB
B-HCG UR QL: NEGATIVE
BASOPHILS # BLD AUTO: 0.05 10*3/MM3 (ref 0–0.2)
BASOPHILS NFR BLD AUTO: 0.8 % (ref 0–2)
BILIRUB UR QL STRIP: ABNORMAL
CLARITY UR: CLEAR
COLOR UR: ABNORMAL
DEPRECATED RDW RBC AUTO: 43.5 FL (ref 40–54)
EOSINOPHIL # BLD AUTO: 0.2 10*3/MM3 (ref 0–0.7)
EOSINOPHIL NFR BLD AUTO: 3.4 % (ref 0–4)
ERYTHROCYTE [DISTWIDTH] IN BLOOD BY AUTOMATED COUNT: 13.1 % (ref 12–15)
GLUCOSE UR STRIP-MCNC: NEGATIVE MG/DL
HCT VFR BLD AUTO: 38.2 % (ref 37–47)
HGB BLD-MCNC: 12.4 G/DL (ref 12–16)
HGB UR QL STRIP.AUTO: NEGATIVE
IMM GRANULOCYTES # BLD: 0.01 10*3/MM3 (ref 0–0.03)
IMM GRANULOCYTES NFR BLD: 0.2 % (ref 0–5)
INR PPP: 1.13 (ref 0.91–1.09)
KETONES UR QL STRIP: ABNORMAL
LEUKOCYTE ESTERASE UR QL STRIP.AUTO: NEGATIVE
LYMPHOCYTES # BLD AUTO: 2.28 10*3/MM3 (ref 0.72–4.86)
LYMPHOCYTES NFR BLD AUTO: 38.3 % (ref 15–45)
MCH RBC QN AUTO: 29.6 PG (ref 28–32)
MCHC RBC AUTO-ENTMCNC: 32.5 G/DL (ref 33–36)
MCV RBC AUTO: 91.2 FL (ref 82–98)
MONOCYTES # BLD AUTO: 0.53 10*3/MM3 (ref 0.19–1.3)
MONOCYTES NFR BLD AUTO: 8.9 % (ref 4–12)
NEUTROPHILS # BLD AUTO: 2.89 10*3/MM3 (ref 1.87–8.4)
NEUTROPHILS NFR BLD AUTO: 48.4 % (ref 39–78)
NITRITE UR QL STRIP: NEGATIVE
PH UR STRIP.AUTO: <=5 [PH] (ref 5–8)
PLATELET # BLD AUTO: 245 10*3/MM3 (ref 130–400)
PMV BLD AUTO: 11.1 FL (ref 6–12)
PROT UR QL STRIP: NEGATIVE
PROTHROMBIN TIME: 14.9 SECONDS (ref 11.9–14.6)
RBC # BLD AUTO: 4.19 10*6/MM3 (ref 4.2–5.4)
SP GR UR STRIP: >=1.03 (ref 1–1.03)
UROBILINOGEN UR QL STRIP: ABNORMAL
WBC NRBC COR # BLD: 5.96 10*3/MM3 (ref 4.8–10.8)

## 2017-07-29 PROCEDURE — 81003 URINALYSIS AUTO W/O SCOPE: CPT | Performed by: SURGERY

## 2017-07-29 PROCEDURE — 36415 COLL VENOUS BLD VENIPUNCTURE: CPT

## 2017-07-29 PROCEDURE — 81025 URINE PREGNANCY TEST: CPT | Performed by: SURGERY

## 2017-07-29 PROCEDURE — 85610 PROTHROMBIN TIME: CPT | Performed by: SURGERY

## 2017-07-29 PROCEDURE — 85025 COMPLETE CBC W/AUTO DIFF WBC: CPT | Performed by: SURGERY

## 2018-09-18 ENCOUNTER — NURSE TRIAGE (OUTPATIENT)
Dept: CALL CENTER | Facility: HOSPITAL | Age: 28
End: 2018-09-18

## 2018-09-19 NOTE — TELEPHONE ENCOUNTER
"    Reason for Disposition  • SEVERE vaginal bleeding (i.e., soaking 2 pads or tampons per hour and present 2 or more hours)    Additional Information  • Negative: Shock suspected (e.g., cold/pale/clammy skin, too weak to stand, low BP, rapid pulse)  • Negative: Difficult to awaken or acting confused (e.g., disoriented, slurred speech)  • Negative: Passed out (i.e., lost consciousness, collapsed and was not responding)  • Negative: Sounds like a life-threatening emergency to the triager  • Negative: Followed a genital area injury  • Negative: Pregnant > 20 weeks  (5 months or more)  • Negative: Pregnant < 20 weeks  (less than 5 months)  • Negative: Postpartum < 1 month since delivery (\"Did you recently give birth?\")  • Negative: Bleeding occurring > 12 months after menopause  • Negative: Bleeding from sexual abuse or rape  • Negative: [1] Vaginal discharge is main symptom AND [2] small amount of blood  • Negative: SEVERE abdominal pain  • Negative: SEVERE dizziness (e.g., unable to stand, requires support to walk, feels like passing out now)    Answer Assessment - Initial Assessment Questions  1. AMOUNT: \"Describe the bleeding that you are having.\"     - SPOTTING: spotting, or pinkish / brownish mucous discharge; does not fill panti-liner or pad     - MILD:  less than 1 pad / hour; less than patient's usual menstrual bleeding    - MODERATE: 1-2 pads / hour; small-medium blood clots (e.g., pea, grape, small coin)     - SEVERE: soaking 2 or more pads/hour for 2 or more hours; bleeding not contained by pads or continuous red blood from vagina; large blood clots (e.g., golf ball, large coin)       Severe   2. ONSET: \"When did the bleeding begin?\" \"Is it continuing now?\"      About an hour ago.   3. MENSTRUAL PERIOD: \"When was the last normal menstrual period?\" \"How is this different than your period?\"      One and a half weeks ago.   4. REGULARITY: \"How regular are your periods?\"      Very regular   5. ABDOMINAL PAIN: " "\"Do you have any pain?\" \"How bad is the pain?\"  (e.g., Scale 1-10; mild, moderate, or severe)    - MILD (1-3): doesn't interfere with normal activities, abdomen soft and not tender to touch     - MODERATE (4-7): interferes with normal activities or awakens from sleep, tender to touch     - SEVERE (8-10): excruciating pain, doubled over, unable to do any normal activities       Severe   6. PREGNANCY: \"Could you be pregnant?\" \"Are you sexually active?\" \"Did you recently give birth?\"      On birth control.  7. BREASTFEEDING: \"Are you breastfeeding?\"      no  8. HORMONES: \"Are you taking any hormone medications, prescription or OTC?\" (e.g., birth control pills, estrogen)      Birth Control Pills   9. BLOOD THINNERS: \"Do you take any blood thinners?\" (e.g., Coumadin/warfarin, Pradaxa/dabigatran, aspirin)      NO   10. CAUSE: \"What do you think is causing the bleeding?\" (e.g., recent gyn surgery, recent gyn procedure; known bleeding disorder, cervical cancer, polycystic ovarian disease, fibroids)          Unknown   11. HEMODYNAMIC STATUS: \"Are you weak or feeling lightheaded?\" If so, ask: \"Can you stand and walk normally?\"         A little dizzy   12. OTHER SYMPTOMS: \"What other symptoms are you having with the bleeding?\" (e.g., passed tissue, vaginal discharge, fever, menstrual-type cramps)        Feeling weak.    Protocols used: VAGINAL BLEEDING - ABNORMAL-ADULT-AH      "

## 2019-04-03 ENCOUNTER — NURSE TRIAGE (OUTPATIENT)
Dept: CALL CENTER | Facility: HOSPITAL | Age: 29
End: 2019-04-03

## 2019-04-03 NOTE — TELEPHONE ENCOUNTER
"Started Sunday stomach cramping having watery diarrhea. Low grade fever. Trying to drink fluids, decreased appetite.  Tried to go to work today but she is having to leave due to diarrhea.   Reason for Disposition  • [1] MODERATE diarrhea (e.g., 4-6 times / day more than normal) AND [2] present > 48 hours (2 days)    Additional Information  • Negative: Shock suspected (e.g., cold/pale/clammy skin, too weak to stand, low BP, rapid pulse)  • Negative: Difficult to awaken or acting confused (e.g., disoriented, slurred speech)  • Negative: Sounds like a life-threatening emergency to the triager  • Negative: Vomiting also present and worse than the diarrhea  • Negative: [1] Blood in stool AND [2] without diarrhea  • Negative: Diarrhea in a cancer patient who is currently (or recently) receiving chemotherapy or radiation therapy, or cancer patient who has metastatic or end-stage cancer and is receiving palliative care  • Negative: [1] SEVERE abdominal pain (e.g., excruciating) AND [2] present > 1 hour  • Negative: [1] SEVERE abdominal pain AND [2] age > 60  • Negative: [1] Blood in the stool AND [2] moderate or large amount of blood  • Negative: Black or tarry bowel movements  (Exception: chronic-unchanged  black-grey bowel movements AND is taking iron pills or Pepto-bismol)  • Negative: [1] Drinking very little AND [2] dehydration suspected (e.g., no urine > 12 hours, very dry mouth, very lightheaded)  • Negative: Patient sounds very sick or weak to the triager  • Negative: [1] SEVERE diarrhea (e.g., 7 or more times / day more than normal) AND [2]  age > 60 years  • Negative: [1] Constant abdominal pain AND [2] present > 2 hours  • Negative: [1] Fever > 103 F (39.4 C) AND [2] not able to get the fever down using Fever Care Advice  • Negative: [1] SEVERE diarrhea (e.g., 7 or more times / day more than normal) AND [2] present > 24 hours (1 day)    Answer Assessment - Initial Assessment Questions  1. DIARRHEA SEVERITY: \"How " "bad is the diarrhea?\" \"How many extra stools have you had in the past 24 hours than normal?\"     - MILD: Few loose or mushy BMs; increase of 1-3 stools over normal daily number of stools; mild increase in ostomy output.    - MODERATE: Increase of 4-6 stools daily over normal; moderate increase in ostomy output.    - SEVERE (or Worst Possible): Increase of 7 or more stools daily over normal; moderate increase in ostomy output; incontinence.      4-5 watery stools  2. ONSET: \"When did the diarrhea begin?\"       Symptoms began Sunday  3. BM CONSISTENCY: \"How loose or watery is the diarrhea?\"       watery  4. VOMITING: \"Are you also vomiting?\" If so, ask: \"How many times in the past 24 hours?\"       No, nausea started Sunday  5. ABDOMINAL PAIN: \"Are you having any abdominal pain?\" If yes: \"What does it feel like?\" (e.g., crampy, dull, intermittent, constant)      Cramping   6. ABDOMINAL PAIN SEVERITY: If present, ask: \"How bad is the pain?\"  (e.g., Scale 1-10; mild, moderate, or severe)     - MILD (1-3): doesn't interfere with normal activities, abdomen soft and not tender to touch      - MODERATE (4-7): interferes with normal activities or awakens from sleep, tender to touch      - SEVERE (8-10): excruciating pain, doubled over, unable to do any normal activities        mild  7. ORAL INTAKE: If vomiting, \"Have you been able to drink liquids?\" \"How much fluids have you had in the past 24 hours?\"     Drinking fluids  8. HYDRATION: \"Any signs of dehydration?\" (e.g., dry mouth [not just dry lips], too weak to stand, dizziness, new weight loss) \"When did you last urinate?\"      Feels hydrated, urine dark yellow  9. EXPOSURE: \"Have you traveled to a foreign country recently?\" \"Have you been exposed to anyone with diarrhea?\" \"Could you have eaten any food that was spoiled?\"      no  10. OTHER SYMPTOMS: \"Do you have any other symptoms?\" (e.g., fever, blood in stool)          11. PREGNANCY: \"Is there any chance you are " "pregnant?\" \"When was your last menstrual period?\"        no    Protocols used: DIARRHEA-ADULT-AH      "

## 2019-08-01 ENCOUNTER — HOSPITAL ENCOUNTER (EMERGENCY)
Facility: HOSPITAL | Age: 29
Discharge: HOME | End: 2019-08-01
Admitting: EMERGENCY MEDICINE

## 2019-08-01 ENCOUNTER — APPOINTMENT (OUTPATIENT)
Dept: GENERAL RADIOLOGY | Facility: HOSPITAL | Age: 29
End: 2019-08-01

## 2019-08-01 ENCOUNTER — APPOINTMENT (OUTPATIENT)
Dept: MRI IMAGING | Facility: HOSPITAL | Age: 29
End: 2019-08-01

## 2019-08-01 ENCOUNTER — APPOINTMENT (OUTPATIENT)
Dept: ULTRASOUND IMAGING | Facility: HOSPITAL | Age: 29
End: 2019-08-01

## 2019-08-01 VITALS
OXYGEN SATURATION: 97 % | DIASTOLIC BLOOD PRESSURE: 72 MMHG | HEART RATE: 78 BPM | TEMPERATURE: 98 F | WEIGHT: 104 LBS | SYSTOLIC BLOOD PRESSURE: 107 MMHG | RESPIRATION RATE: 16 BRPM | BODY MASS INDEX: 19.14 KG/M2 | HEIGHT: 62 IN

## 2019-08-01 DIAGNOSIS — R29.898 WEAKNESS OF LEFT LOWER EXTREMITY: ICD-10-CM

## 2019-08-01 DIAGNOSIS — M54.32 SCIATICA OF LEFT SIDE: ICD-10-CM

## 2019-08-01 DIAGNOSIS — N94.3 PMS (PREMENSTRUAL SYNDROME): Primary | ICD-10-CM

## 2019-08-01 LAB
ALBUMIN SERPL-MCNC: 4.4 G/DL (ref 3.5–5)
ALBUMIN/GLOB SERPL: 1.5 G/DL (ref 1.1–2.5)
ALP SERPL-CCNC: 57 U/L (ref 24–120)
ALT SERPL W P-5'-P-CCNC: 45 U/L (ref 0–54)
ANION GAP SERPL CALCULATED.3IONS-SCNC: 7 MMOL/L (ref 4–13)
AST SERPL-CCNC: 35 U/L (ref 7–45)
B-HCG UR QL: NEGATIVE
BASOPHILS # BLD AUTO: 0.05 10*3/MM3 (ref 0–0.2)
BASOPHILS NFR BLD AUTO: 0.6 % (ref 0–2)
BILIRUB SERPL-MCNC: 0.2 MG/DL (ref 0.1–1)
BILIRUB UR QL STRIP: NEGATIVE
BUN BLD-MCNC: 17 MG/DL (ref 5–21)
BUN/CREAT SERPL: 27.4 (ref 7–25)
CALCIUM SPEC-SCNC: 9.4 MG/DL (ref 8.4–10.4)
CHLORIDE SERPL-SCNC: 105 MMOL/L (ref 98–110)
CLARITY UR: CLEAR
CO2 SERPL-SCNC: 27 MMOL/L (ref 24–31)
COLOR UR: YELLOW
CREAT BLD-MCNC: 0.62 MG/DL (ref 0.5–1.4)
DEPRECATED RDW RBC AUTO: 41.1 FL (ref 40–54)
EOSINOPHIL # BLD AUTO: 0.27 10*3/MM3 (ref 0–0.7)
EOSINOPHIL NFR BLD AUTO: 3.4 % (ref 0–4)
ERYTHROCYTE [DISTWIDTH] IN BLOOD BY AUTOMATED COUNT: 12.4 % (ref 12–15)
GFR SERPL CREATININE-BSD FRML MDRD: 115 ML/MIN/1.73
GLOBULIN UR ELPH-MCNC: 3 GM/DL
GLUCOSE BLD-MCNC: 101 MG/DL (ref 70–100)
GLUCOSE UR STRIP-MCNC: NEGATIVE MG/DL
HCT VFR BLD AUTO: 38.3 % (ref 37–47)
HGB BLD-MCNC: 12.5 G/DL (ref 12–16)
HGB UR QL STRIP.AUTO: NEGATIVE
IMM GRANULOCYTES # BLD AUTO: 0.02 10*3/MM3 (ref 0–0.05)
IMM GRANULOCYTES NFR BLD AUTO: 0.3 % (ref 0–5)
INTERNAL NEGATIVE CONTROL: NEGATIVE
INTERNAL POSITIVE CONTROL: POSITIVE
KETONES UR QL STRIP: NEGATIVE
LEUKOCYTE ESTERASE UR QL STRIP.AUTO: NEGATIVE
LYMPHOCYTES # BLD AUTO: 2.57 10*3/MM3 (ref 0.72–4.86)
LYMPHOCYTES NFR BLD AUTO: 32.8 % (ref 15–45)
Lab: NORMAL
MCH RBC QN AUTO: 29.8 PG (ref 28–32)
MCHC RBC AUTO-ENTMCNC: 32.6 G/DL (ref 33–36)
MCV RBC AUTO: 91.2 FL (ref 82–98)
MONOCYTES # BLD AUTO: 0.36 10*3/MM3 (ref 0.19–1.3)
MONOCYTES NFR BLD AUTO: 4.6 % (ref 4–12)
NEUTROPHILS # BLD AUTO: 4.57 10*3/MM3 (ref 1.87–8.4)
NEUTROPHILS NFR BLD AUTO: 58.3 % (ref 39–78)
NITRITE UR QL STRIP: NEGATIVE
NRBC BLD AUTO-RTO: 0 /100 WBC (ref 0–0.2)
PH UR STRIP.AUTO: 6.5 [PH] (ref 5–8)
PLATELET # BLD AUTO: 237 10*3/MM3 (ref 130–400)
PMV BLD AUTO: 10.6 FL (ref 6–12)
POTASSIUM BLD-SCNC: 3.5 MMOL/L (ref 3.5–5.3)
PROT SERPL-MCNC: 7.4 G/DL (ref 6.3–8.7)
PROT UR QL STRIP: NEGATIVE
RBC # BLD AUTO: 4.2 10*6/MM3 (ref 4.2–5.4)
SODIUM BLD-SCNC: 139 MMOL/L (ref 135–145)
SP GR UR STRIP: 1.02 (ref 1–1.03)
UROBILINOGEN UR QL STRIP: NORMAL
WBC NRBC COR # BLD: 7.84 10*3/MM3 (ref 4.8–10.8)

## 2019-08-01 PROCEDURE — 51798 US URINE CAPACITY MEASURE: CPT

## 2019-08-01 PROCEDURE — 25010000002 METHYLPREDNISOLONE PER 125 MG: Performed by: PHYSICIAN ASSISTANT

## 2019-08-01 PROCEDURE — 81003 URINALYSIS AUTO W/O SCOPE: CPT | Performed by: PHYSICIAN ASSISTANT

## 2019-08-01 PROCEDURE — 96374 THER/PROPH/DIAG INJ IV PUSH: CPT

## 2019-08-01 PROCEDURE — 93971 EXTREMITY STUDY: CPT | Performed by: SURGERY

## 2019-08-01 PROCEDURE — 93971 EXTREMITY STUDY: CPT

## 2019-08-01 PROCEDURE — 25010000002 ORPHENADRINE CITRATE PER 60 MG: Performed by: PHYSICIAN ASSISTANT

## 2019-08-01 PROCEDURE — 80053 COMPREHEN METABOLIC PANEL: CPT | Performed by: PHYSICIAN ASSISTANT

## 2019-08-01 PROCEDURE — 72148 MRI LUMBAR SPINE W/O DYE: CPT

## 2019-08-01 PROCEDURE — 85025 COMPLETE CBC W/AUTO DIFF WBC: CPT | Performed by: PHYSICIAN ASSISTANT

## 2019-08-01 PROCEDURE — 96375 TX/PRO/DX INJ NEW DRUG ADDON: CPT

## 2019-08-01 PROCEDURE — 81025 URINE PREGNANCY TEST: CPT | Performed by: PHYSICIAN ASSISTANT

## 2019-08-01 PROCEDURE — 72110 X-RAY EXAM L-2 SPINE 4/>VWS: CPT

## 2019-08-01 PROCEDURE — 25010000002 KETOROLAC TROMETHAMINE PER 15 MG: Performed by: PHYSICIAN ASSISTANT

## 2019-08-01 PROCEDURE — 99283 EMERGENCY DEPT VISIT LOW MDM: CPT

## 2019-08-01 RX ORDER — METHYLPREDNISOLONE SODIUM SUCCINATE 125 MG/2ML
125 INJECTION, POWDER, LYOPHILIZED, FOR SOLUTION INTRAMUSCULAR; INTRAVENOUS ONCE
Status: COMPLETED | OUTPATIENT
Start: 2019-08-01 | End: 2019-08-01

## 2019-08-01 RX ORDER — ETODOLAC 200 MG/1
200 CAPSULE ORAL EVERY 8 HOURS
Qty: 15 CAPSULE | Refills: 0 | Status: SHIPPED | OUTPATIENT
Start: 2019-08-01 | End: 2019-08-01 | Stop reason: SDUPTHER

## 2019-08-01 RX ORDER — METHYLPREDNISOLONE 4 MG/1
TABLET ORAL
Qty: 21 EACH | Refills: 0 | Status: SHIPPED | OUTPATIENT
Start: 2019-08-01 | End: 2019-08-01 | Stop reason: SDUPTHER

## 2019-08-01 RX ORDER — ORPHENADRINE CITRATE 30 MG/ML
60 INJECTION INTRAMUSCULAR; INTRAVENOUS ONCE
Status: COMPLETED | OUTPATIENT
Start: 2019-08-01 | End: 2019-08-01

## 2019-08-01 RX ORDER — ETODOLAC 200 MG/1
200 CAPSULE ORAL EVERY 8 HOURS
Qty: 15 CAPSULE | Refills: 0 | Status: SHIPPED | OUTPATIENT
Start: 2019-08-01 | End: 2020-03-06

## 2019-08-01 RX ORDER — KETOROLAC TROMETHAMINE 15 MG/ML
15 INJECTION, SOLUTION INTRAMUSCULAR; INTRAVENOUS ONCE
Status: COMPLETED | OUTPATIENT
Start: 2019-08-01 | End: 2019-08-01

## 2019-08-01 RX ORDER — BACLOFEN 10 MG/1
10 TABLET ORAL 3 TIMES DAILY
Qty: 15 TABLET | Refills: 0 | Status: SHIPPED | OUTPATIENT
Start: 2019-08-01 | End: 2020-03-06

## 2019-08-01 RX ORDER — BACLOFEN 10 MG/1
10 TABLET ORAL 3 TIMES DAILY
Qty: 15 TABLET | Refills: 0 | Status: SHIPPED | OUTPATIENT
Start: 2019-08-01 | End: 2019-08-01 | Stop reason: SDUPTHER

## 2019-08-01 RX ORDER — METHYLPREDNISOLONE 4 MG/1
TABLET ORAL
Qty: 21 EACH | Refills: 0 | Status: SHIPPED | OUTPATIENT
Start: 2019-08-01 | End: 2020-03-06

## 2019-08-01 RX ADMIN — KETOROLAC TROMETHAMINE 15 MG: 15 INJECTION, SOLUTION INTRAMUSCULAR; INTRAVENOUS at 15:24

## 2019-08-01 RX ADMIN — ORPHENADRINE CITRATE 60 MG: 60 INJECTION INTRAMUSCULAR; INTRAVENOUS at 15:24

## 2019-08-01 RX ADMIN — METHYLPREDNISOLONE SODIUM SUCCINATE 125 MG: 125 INJECTION, POWDER, FOR SOLUTION INTRAMUSCULAR; INTRAVENOUS at 15:24

## 2019-08-01 NOTE — ED PROVIDER NOTES
"Subjective   History of Present Illness  28-year-old female presents with a chief complaint of left leg pain and diffuse body pain.  The patient reports for the past year she has had a one day prior to her.  She begins to have burning sensation in her extremities and it continues to be present until her period ends.  The patient notes she had similar symptoms began  and the past few days she has had pain that shoots down her left leg.  It goes from her left buttock down all the way to her feet.  Patient reports \"this is not sciatica because I have sciatica know what it feels like.\"  The patient says her sciatica typically goes from her left buttock down to her popliteal fossa but never passed it.  The patient says the pain is different as this is a burning shooting pain that her previous sciatica pain.  The patient also feels like there is warmth in her leg.  She notes a few days ago she had an ingrown hair in her pubic region that she had popped and wonders if this was infected and has now infected her left leg.  Review of Systems   All other systems reviewed and are negative.      Past Medical History:   Diagnosis Date   • ADHD    • Fracture of rib of left side        No Known Allergies    Past Surgical History:   Procedure Laterality Date   • CYSTECTOMY Right 2014    right breast       Family History   Problem Relation Age of Onset   • Gout Father    • Cancer Maternal Grandmother         breast   • Diabetes Maternal Grandmother        Social History     Socioeconomic History   • Marital status: Single     Spouse name: Not on file   • Number of children: Not on file   • Years of education: Not on file   • Highest education level: Not on file   Tobacco Use   • Smoking status: Former Smoker     Packs/day: 0.25     Types: Cigarettes     Last attempt to quit: 2012     Years since quittin.8   • Smokeless tobacco: Never Used   Substance and Sexual Activity   • Alcohol use: Yes     Alcohol/week: 0.6 " oz     Types: 1 Glasses of wine per week     Comment: occasional   • Drug use: No   • Sexual activity: Defer           Objective   Physical Exam   Constitutional: She is oriented to person, place, and time. She appears well-developed and well-nourished.   HENT:   Head: Normocephalic and atraumatic.   Eyes: EOM are normal. Pupils are equal, round, and reactive to light.   Neck: Normal range of motion. Neck supple.   Cardiovascular: Normal rate and regular rhythm.   Pulmonary/Chest: Effort normal and breath sounds normal.   Abdominal: Soft. Bowel sounds are normal.   Musculoskeletal: Normal range of motion.   Neurological: She is alert and oriented to person, place, and time. No cranial nerve deficit. Coordination normal.   Skin: Skin is warm and dry. Capillary refill takes less than 2 seconds.   Psychiatric: She has a normal mood and affect. Her behavior is normal.   Nursing note and vitals reviewed.      Procedures           ED Course        Labs Reviewed   COMPREHENSIVE METABOLIC PANEL - Abnormal; Notable for the following components:       Result Value    Glucose 101 (*)     BUN/Creatinine Ratio 27.4 (*)     All other components within normal limits    Narrative:     GFR Normal >60  Chronic Kidney Disease <60  Kidney Failure <15   CBC WITH AUTO DIFFERENTIAL - Abnormal; Notable for the following components:    MCHC 32.6 (*)     All other components within normal limits   URINALYSIS W/ CULTURE IF INDICATED - Normal    Narrative:     Urine microscopic not indicated.   POCT PEFORM URINE PREGNANCY - Normal   CBC AND DIFFERENTIAL    Narrative:     The following orders were created for panel order CBC & Differential.  Procedure                               Abnormality         Status                     ---------                               -----------         ------                     CBC Auto Differential[[id number]]         Abnormal            Final result                 Please view results for these tests on the  individual orders.     MRI Lumbar Spine Without Contrast   Final Result   Minimal degenerative disc disease at L4-L5 and L5-S1 without   thecal sac stenosis or neuroforaminal narrowing.   This report was finalized on 08/01/2019 18:55 by Dr. Homero Cooper MD.      XR Spine Lumbar 4+ View   Final Result      US Venous Doppler Lower Extremity Left (duplex)    (Results Pending)               MDM  Number of Diagnoses or Management Options  PMS (premenstrual syndrome): new and requires workup  Sciatica of left side: new and requires workup  Diagnosis management comments: Negative workup, improved, will dc in stable condition, no evidence of cauda equina, pvr was 40mL    Patient did have persistent left leg weakness.  I did evaluate this again and there is weakness notable with extension and flexion at the knee and with dorsiflexion plantarflexion of the left foot.  Dr. Wade had saw the patient as well, tendon reflexes were appreciated and were brisk.  There were equal bilaterally.  An MRI was ordered.  It was negative.  Patient will be discharged in stable condition to follow-up with neurology as an outpatient for further work-up.       Amount and/or Complexity of Data Reviewed  Clinical lab tests: reviewed and ordered  Tests in the radiology section of CPT®: ordered and reviewed  Tests in the medicine section of CPT®: ordered and reviewed    Risk of Complications, Morbidity, and/or Mortality  Presenting problems: moderate  Diagnostic procedures: moderate  Management options: moderate    Patient Progress  Patient progress: stable        Final diagnoses:   PMS (premenstrual syndrome)   Sciatica of left side   Weakness of left lower extremity            Glen Castro PA-C  08/01/19 [id number]       Glen Castro PA-C  08/01/19 2016

## 2019-08-01 NOTE — DISCHARGE INSTRUCTIONS
Follow up with one of the [facility] physician groups below to setup primary care. If you have trouble making an appointment, please call the [facility] Nurse Line at [phone]    Dr. Lucho Marie DO, Dr. Pravin Collier DO,  NAEEM Goel, and NAEEM Grimm  [facility] - [address], [address], [place], [place] [address] [phone]     Dr. Adolfo Olson MD  [facility] - [address], [address], [place], [place] [address] [phone]    Dr. Ailyn Cazares MD, and NAEEM Brunson  [facility] [address], [place], [place] [address] [phone]    Dr. Ariel Nieto MD and Dr. Bernard Madison MD  [facility] [address], [address]  [phone]    Dr. Kael Stinson MD  [facility]  [address] [facility] [address] [facility] [address], [place], [place], [address] [phone]    Dr. Nikolai Ly MD  [facility] - [place]  [address], [place], [address] [phone]

## 2019-08-08 ENCOUNTER — NURSE TRIAGE (OUTPATIENT)
Dept: CALL CENTER | Facility: HOSPITAL | Age: 29
End: 2019-08-08

## 2019-08-08 NOTE — TELEPHONE ENCOUNTER
"    Reason for Disposition  • Severe eye pain    Additional Information  • Negative: Chemical got in the eye  • Negative: Piece of something got in the eye  • Negative: Eye redness followed an eye injury  • Negative: Yellow or green pus in the eyes  • Negative: [1] Eyelid is swollen AND [2] no redness of white of eye (sclera)  • Negative: Caused by pollen or other allergy OR the main symptom is itchy eyes  • Negative: Red, widespread rash also present    Answer Assessment - Initial Assessment Questions  1. LOCATION: Location: \"What's red, the eyeball or the outer eyelids?\" (Note: when callers say the eye is red, they usually mean the sclera is red)        Right eyeball   2. REDNESS OF SCLERA: \"Is the redness in one or both eyes?\" \"When did the redness start?\"       Just the right. This morning   3. ONSET: \"When did the eye become red?\" (e.g., hours, days)       This morning   4. EYELIDS: \"Are the eyelids red or swollen?\" If so, ask: \"How much?\"       No   5. VISION: \"Is there any difficulty seeing clearly?\"       Seeing a halo in right eye   6. ITCHING: \"Does it feel itchy?\" If so ask: \"How bad is it\" (e.g., Scale 1-10; or mild, moderate, severe)      Yes; moderate to severe   7. PAIN: \"Is there any pain? If so, ask: \"How bad is it?\" (e.g., Scale 1-10; or mild, moderate, severe)      Yes moderate to severe   8. CONTACT LENS: \"Do you wear contacts?\"      unk   9. CAUSE: \"What do you think is causing the redness?\"      Unknown, but there is now a blister on eyeball.   10. OTHER SYMPTOMS: \"Do you have any other symptoms?\" (e.g., fever, runny nose, cough, vomiting)        Eye is very painful and irritated. She states that right pupil is not dilating as normal.    Protocols used: EYE - RED WITHOUT PUS-ADULT-AH      "

## 2020-03-06 PROCEDURE — 87255 GENET VIRUS ISOLATE HSV: CPT | Performed by: NURSE PRACTITIONER

## 2020-05-18 ENCOUNTER — NURSE TRIAGE (OUTPATIENT)
Dept: CALL CENTER | Facility: HOSPITAL | Age: 30
End: 2020-05-18

## 2020-05-18 NOTE — TELEPHONE ENCOUNTER
diarrhea    Reason for Disposition  • MILD-MODERATE diarrhea (e.g., 1-6 times / day more than normal)    Additional Information  • Negative: Shock suspected (e.g., cold/pale/clammy skin, too weak to stand, low BP, rapid pulse)  • Negative: Difficult to awaken or acting confused (e.g., disoriented, slurred speech)  • Negative: Sounds like a life-threatening emergency to the triager  • Negative: Vomiting also present and worse than the diarrhea  • Negative: [1] Blood in stool AND [2] without diarrhea  • Negative: Diarrhea in a cancer patient who is currently (or recently) receiving chemotherapy or radiation therapy, or cancer patient who has metastatic or end-stage cancer and is receiving palliative care  • Negative: [1] SEVERE abdominal pain (e.g., excruciating) AND [2] present > 1 hour  • Negative: [1] SEVERE abdominal pain AND [2] age > 60  • Negative: [1] Blood in the stool AND [2] moderate or large amount of blood  • Negative: Black or tarry bowel movements  (Exception: chronic-unchanged  black-grey bowel movements AND is taking iron pills or Pepto-bismol)  • Negative: [1] Drinking very little AND [2] dehydration suspected (e.g., no urine > 12 hours, very dry mouth, very lightheaded)  • Negative: Patient sounds very sick or weak to the triager  • Negative: [1] SEVERE diarrhea (e.g., 7 or more times / day more than normal) AND [2] age > 60 years  • Negative: [1] Constant abdominal pain AND [2] present > 2 hours  • Negative: [1] Fever > 103 F (39.4 C) AND [2] not able to get the fever down using Fever Care Advice  • Negative: [1] SEVERE diarrhea (e.g., 7 or more times / day more than normal) AND [2] present > 24 hours (1 day)  • Negative: [1] MODERATE diarrhea (e.g., 4-6 times / day more than normal) AND [2] present > 48 hours (2 days)  • Negative: [1] MODERATE diarrhea (e.g., 4-6 times / day more than normal) AND [2] age > 70 years  • Negative: Fever > 101 F (38.3 C)  • Negative: Fever present > 3 days (72  "hours)  • Negative: Abdominal pain  (Exception: Pain clears with each passage of diarrhea stool)  • Negative: [1] Blood in the stool AND [2] small amount of blood   (Exception: only on toilet paper. Reason: diarrhea can cause rectal irritation with blood on wiping)  • Negative: [1] Mucus or pus in stool AND [2] present > 2 days AND [3] diarrhea is more than mild  • Negative: [1] Recent antibiotic therapy (i.e., within last 2 months) AND [2] diarrhea present > 3 days since antibiotic was stopped  • Negative: [1] Recent hospitalization AND [2] diarrhea present > 3 days  • Negative: Weak immune system (e.g., HIV positive, cancer chemo, splenectomy, organ transplant, chronic steroids)  • Negative: Tube feedings (e.g., nasogastric, g-tube, j-tube)  • Negative: Travel to a foreign country in past month  • Negative: [1] MILD diarrhea (e.g., 1-3 or more stools than normal in past 24 hours) without known cause AND [2] present >  7 days  • Negative: Diarrhea is a chronic symptom (recurrent or ongoing AND present > 4 weeks)  • Negative: SEVERE diarrhea (e.g., 7 or more times / day more than normal)    Answer Assessment - Initial Assessment Questions  1. DIARRHEA SEVERITY: \"How bad is the diarrhea?\" \"How many extra stools have you had in the past 24 hours than normal?\"     - NO DIARRHEA (SCALE 0)    - MILD (SCALE 1-3): Few loose or mushy BMs; increase of 1-3 stools over normal daily number of stools; mild increase in ostomy output.    -  MODERATE (SCALE 4-7): Increase of 4-6 stools daily over normal; moderate increase in ostomy output.  * SEVERE (SCALE 8-10; OR 'WORST POSSIBLE'): Increase of 7 or more stools daily over normal; moderate increase in ostomy output; incontinence.      mild  2. ONSET: \"When did the diarrhea begin?\"       Day and half  3. BM CONSISTENCY: \"How loose or watery is the diarrhea?\"       loose  4. VOMITING: \"Are you also vomiting?\" If so, ask: \"How many times in the past 24 hours?\"       No, nausea  5. " "ABDOMINAL PAIN: \"Are you having any abdominal pain?\" If yes: \"What does it feel like?\" (e.g., crampy, dull, intermittent, constant)        Abdominal pain  6. ABDOMINAL PAIN SEVERITY: If present, ask: \"How bad is the pain?\"  (e.g., Scale 1-10; mild, moderate, or severe)    - MILD (1-3): doesn't interfere with normal activities, abdomen soft and not tender to touch     - MODERATE (4-7): interferes with normal activities or awakens from sleep, tender to touch     - SEVERE (8-10): excruciating pain, doubled over, unable to do any normal activities        5  7. ORAL INTAKE: If vomiting, \"Have you been able to drink liquids?\" \"How much fluids have you had in the past 24 hours?\"      Able to drrink  8. HYDRATION: \"Any signs of dehydration?\" (e.g., dry mouth [not just dry lips], too weak to stand, dizziness, new weight loss) \"When did you last urinate?\"      Yes    9. EXPOSURE: \"Have you traveled to a foreign country recently?\" \"Have you been exposed to anyone with diarrhea?\" \"Could you have eaten any food that was spoiled?\"      no  10. ANTIBIOTIC USE: \"Are you taking antibiotics now or have you taken antibiotics in the past 2 months?\"        yes  11. OTHER SYMPTOMS: \"Do you have any other symptoms?\" (e.g., fever, blood in stool)        no  12. PREGNANCY: \"Is there any chance you are pregnant?\" \"When was your last menstrual period?\"        no    Protocols used: DIARRHEA-ADULT-AH      "

## 2020-05-19 ENCOUNTER — HOSPITAL ENCOUNTER (EMERGENCY)
Facility: HOSPITAL | Age: 30
Discharge: HOME | End: 2020-05-19
Attending: EMERGENCY MEDICINE | Admitting: EMERGENCY MEDICINE

## 2020-05-19 ENCOUNTER — APPOINTMENT (OUTPATIENT)
Dept: GENERAL RADIOLOGY | Facility: HOSPITAL | Age: 30
End: 2020-05-19

## 2020-05-19 VITALS
DIASTOLIC BLOOD PRESSURE: 78 MMHG | OXYGEN SATURATION: 100 % | BODY MASS INDEX: 20.24 KG/M2 | RESPIRATION RATE: 16 BRPM | HEART RATE: 87 BPM | TEMPERATURE: 98.6 F | SYSTOLIC BLOOD PRESSURE: 102 MMHG | HEIGHT: 62 IN | WEIGHT: 110 LBS

## 2020-05-19 DIAGNOSIS — R05.3 CHRONIC COUGH: Primary | ICD-10-CM

## 2020-05-19 DIAGNOSIS — R07.89 ATYPICAL CHEST PAIN: ICD-10-CM

## 2020-05-19 DIAGNOSIS — R06.09 CHRONIC DYSPNEA: ICD-10-CM

## 2020-05-19 LAB
ALBUMIN SERPL-MCNC: 4.4 G/DL (ref 3.5–5.2)
ALBUMIN/GLOB SERPL: 1.6 G/DL
ALP SERPL-CCNC: 51 U/L (ref 39–117)
ALT SERPL W P-5'-P-CCNC: 19 U/L (ref 1–33)
ANION GAP SERPL CALCULATED.3IONS-SCNC: 11 MMOL/L (ref 5–15)
AST SERPL-CCNC: 23 U/L (ref 1–32)
BASOPHILS # BLD AUTO: 0.05 10*3/MM3 (ref 0–0.2)
BASOPHILS NFR BLD AUTO: 1 % (ref 0–1.5)
BILIRUB SERPL-MCNC: 0.4 MG/DL (ref 0.2–1.2)
BUN BLD-MCNC: 9 MG/DL (ref 6–20)
BUN/CREAT SERPL: 16.4 (ref 7–25)
CALCIUM SPEC-SCNC: 9 MG/DL (ref 8.6–10.5)
CHLORIDE SERPL-SCNC: 106 MMOL/L (ref 98–107)
CO2 SERPL-SCNC: 24 MMOL/L (ref 22–29)
CREAT BLD-MCNC: 0.55 MG/DL (ref 0.57–1)
D DIMER PPP FEU-MCNC: <0.22 MG/L (FEU) (ref 0–0.5)
DEPRECATED RDW RBC AUTO: 45.3 FL (ref 37–54)
EOSINOPHIL # BLD AUTO: 0.14 10*3/MM3 (ref 0–0.4)
EOSINOPHIL NFR BLD AUTO: 2.8 % (ref 0.3–6.2)
ERYTHROCYTE [DISTWIDTH] IN BLOOD BY AUTOMATED COUNT: 13.2 % (ref 12.3–15.4)
GFR SERPL CREATININE-BSD FRML MDRD: 131 ML/MIN/1.73
GLOBULIN UR ELPH-MCNC: 2.7 GM/DL
GLUCOSE BLD-MCNC: 90 MG/DL (ref 65–99)
HCT VFR BLD AUTO: 38.3 % (ref 34–46.6)
HGB BLD-MCNC: 12.5 G/DL (ref 12–15.9)
IMM GRANULOCYTES # BLD AUTO: 0.01 10*3/MM3 (ref 0–0.05)
IMM GRANULOCYTES NFR BLD AUTO: 0.2 % (ref 0–0.5)
LYMPHOCYTES # BLD AUTO: 1.78 10*3/MM3 (ref 0.7–3.1)
LYMPHOCYTES NFR BLD AUTO: 35.9 % (ref 19.6–45.3)
MCH RBC QN AUTO: 30.3 PG (ref 26.6–33)
MCHC RBC AUTO-ENTMCNC: 32.6 G/DL (ref 31.5–35.7)
MCV RBC AUTO: 93 FL (ref 79–97)
MONOCYTES # BLD AUTO: 0.31 10*3/MM3 (ref 0.1–0.9)
MONOCYTES NFR BLD AUTO: 6.3 % (ref 5–12)
NEUTROPHILS # BLD AUTO: 2.67 10*3/MM3 (ref 1.7–7)
NEUTROPHILS NFR BLD AUTO: 53.8 % (ref 42.7–76)
NRBC BLD AUTO-RTO: 0 /100 WBC (ref 0–0.2)
NT-PROBNP SERPL-MCNC: 30.8 PG/ML (ref 5–450)
PLATELET # BLD AUTO: 213 10*3/MM3 (ref 140–450)
PMV BLD AUTO: 10.9 FL (ref 6–12)
POTASSIUM BLD-SCNC: 3.7 MMOL/L (ref 3.5–5.2)
PROT SERPL-MCNC: 7.1 G/DL (ref 6–8.5)
RBC # BLD AUTO: 4.12 10*6/MM3 (ref 3.77–5.28)
SODIUM BLD-SCNC: 141 MMOL/L (ref 136–145)
TROPONIN T SERPL-MCNC: <0.01 NG/ML (ref 0–0.03)
WBC NRBC COR # BLD: 4.96 10*3/MM3 (ref 3.4–10.8)

## 2020-05-19 PROCEDURE — 93010 ELECTROCARDIOGRAM REPORT: CPT | Performed by: INTERNAL MEDICINE

## 2020-05-19 PROCEDURE — 99283 EMERGENCY DEPT VISIT LOW MDM: CPT

## 2020-05-19 PROCEDURE — 83880 ASSAY OF NATRIURETIC PEPTIDE: CPT | Performed by: EMERGENCY MEDICINE

## 2020-05-19 PROCEDURE — 71045 X-RAY EXAM CHEST 1 VIEW: CPT

## 2020-05-19 PROCEDURE — 80053 COMPREHEN METABOLIC PANEL: CPT | Performed by: EMERGENCY MEDICINE

## 2020-05-19 PROCEDURE — 94799 UNLISTED PULMONARY SVC/PX: CPT

## 2020-05-19 PROCEDURE — 93005 ELECTROCARDIOGRAM TRACING: CPT | Performed by: EMERGENCY MEDICINE

## 2020-05-19 PROCEDURE — 84484 ASSAY OF TROPONIN QUANT: CPT | Performed by: EMERGENCY MEDICINE

## 2020-05-19 PROCEDURE — 94640 AIRWAY INHALATION TREATMENT: CPT

## 2020-05-19 PROCEDURE — 85379 FIBRIN DEGRADATION QUANT: CPT | Performed by: EMERGENCY MEDICINE

## 2020-05-19 PROCEDURE — 85025 COMPLETE CBC W/AUTO DIFF WBC: CPT | Performed by: EMERGENCY MEDICINE

## 2020-05-19 RX ORDER — SODIUM CHLORIDE 0.9 % (FLUSH) 0.9 %
10 SYRINGE (ML) INJECTION AS NEEDED
Status: DISCONTINUED | OUTPATIENT
Start: 2020-05-19 | End: 2020-05-19 | Stop reason: HOSPADM

## 2020-05-19 RX ORDER — ALBUTEROL SULFATE 2.5 MG/3ML
2.5 SOLUTION RESPIRATORY (INHALATION) ONCE
Status: COMPLETED | OUTPATIENT
Start: 2020-05-19 | End: 2020-05-19

## 2020-05-19 RX ADMIN — ALBUTEROL SULFATE 2.5 MG: 2.5 SOLUTION RESPIRATORY (INHALATION) at 15:36

## 2020-05-19 NOTE — DISCHARGE INSTRUCTIONS
Follow up with one of the [facility] physician groups below to setup primary care. If you have trouble making an appointment, please call the [facility] Nurse Line at [phone]    Dr. Cuca Perez DO, Dr. Jamar Whitney DO, and NAEEM Angel  [facility]  [address] [place] [address], [address] [phone]    Dr. Adolfo Olson MD  [facility] Medicine - [address] [address], [address], [place], [place] [address] [phone]    Dr. Lucho Marie DO, Dr. Pravin Collier DO,  NAEEM Goel, and NAEEM Grimm  [facility] - [address] [address], [address], [place], [place] [id number] [phone]     Dr. Ailyn Cazares MD, and NAEEM Brunson  [facility] [address] [address], [place], [place] [address] [phone]    Dr. Ariel Nieto MD and Dr. Bernard Madison MD  [address], [address] [phone]    Dr. Kael Stinson MD  [facility]  [address] [place] [address], [address], [place], [place], [address] [phone]    Dr. Nikolai Ly MD  [facility]  [address], [place], [address] [phone]

## 2020-05-19 NOTE — ED PROVIDER NOTES
Subjective   Patient is a 29-year-old female who presents to the ER with chest pain.  Patient states she has had upper respiratory infection type symptoms for the last 5 to 6 months.  Patient states she has a nonproductive cough, shortness of air and wheezing.  She has seen several physicians and was diagnosed with bronchitis.  She was treated with steroids and cough medicine as well as nebulizers but her symptoms have not improved.  Patient has been tested for the flu and COVID virus both of which were negative.  Patient states that over the last 2 days she developed chest pain as well.  Patient describes it as a squeezing pain in her bilateral chest with radiation to her bilateral shoulders.  Pain is intermittent and will last for approximately 2 minutes when it occurs.  Pain is worse with lying flat.  She denies any fever, abdominal pain, nausea vomiting diarrhea, urinary changes, neurologic changes, leg pain or swelling.  Patient has tried Zyrtec, azithromycin, albuterol, and steroids with no improvement.          Review of Systems   Constitutional: Negative.    HENT: Negative.    Eyes: Negative.    Respiratory: Positive for cough, shortness of breath and wheezing.    Cardiovascular: Positive for chest pain.   Gastrointestinal: Negative.    Endocrine: Negative.    Genitourinary: Negative.    Musculoskeletal: Negative.    Skin: Negative.    Allergic/Immunologic: Negative.    Neurological: Negative.    Hematological: Negative.    Psychiatric/Behavioral: Negative.    All other systems reviewed and are negative.      Past Medical History:   Diagnosis Date   • ADD (attention deficit disorder)    • Fracture of rib of left side        No Known Allergies    Past Surgical History:   Procedure Laterality Date   • CYSTECTOMY Right 01/22/2014    right breast       Family History   Problem Relation Age of Onset   • Gout Father    • Cancer Maternal Grandmother         breast   • Diabetes Maternal Grandmother        Social  History     Socioeconomic History   • Marital status: Single     Spouse name: Not on file   • Number of children: Not on file   • Years of education: Not on file   • Highest education level: Not on file   Tobacco Use   • Smoking status: Former Smoker     Packs/day: 0.25     Types: Cigarettes     Last attempt to quit: 2012     Years since quittin.6   • Smokeless tobacco: Never Used   Substance and Sexual Activity   • Alcohol use: Yes     Alcohol/week: 1.0 standard drinks     Types: 1 Glasses of wine per week     Comment: occasional   • Drug use: No   • Sexual activity: Defer           Objective   Physical Exam   Constitutional: She is oriented to person, place, and time. She appears well-developed and well-nourished.   HENT:   Head: Normocephalic and atraumatic.   Eyes: Pupils are equal, round, and reactive to light. Conjunctivae are normal.   Neck: Normal range of motion.   Cardiovascular: Normal rate, regular rhythm and normal heart sounds.   Pulmonary/Chest: Effort normal and breath sounds normal.   Abdominal: Soft. There is no tenderness.   Musculoskeletal: Normal range of motion. She exhibits no edema or deformity.   Neurological: She is alert and oriented to person, place, and time. She has normal strength.   Skin: Skin is warm.   Psychiatric: She has a normal mood and affect. Her behavior is normal.   Nursing note and vitals reviewed.      Procedures           ED Course      ECG: Normal sinus rhythm with a rate of 95, incomplete right bundle branch block, no acute ischemia or infarction    Patient was given albuterol.     Lab Results (last 24 hours)     Procedure Component Value Units Date/Time    CBC & Differential [[id number]] Collected:  20    Specimen:  Blood from Arm, Right Updated:  20    Narrative:       The following orders were created for panel order CBC & Differential.  Procedure                               Abnormality         Status                     ---------                                -----------         ------                     CBC Auto Differential[[id number]]        Normal              Final result                 Please view results for these tests on the individual orders.    Comprehensive Metabolic Panel [[id number]]  (Abnormal) Collected:  05/19/20 1530    Specimen:  Blood from Arm, Right Updated:  05/19/20 1555     Glucose 90 mg/dL      BUN 9 mg/dL      Creatinine 0.55 mg/dL      Sodium 141 mmol/L      Potassium 3.7 mmol/L      Chloride 106 mmol/L      CO2 24.0 mmol/L      Calcium 9.0 mg/dL      Total Protein 7.1 g/dL      Albumin 4.40 g/dL      ALT (SGPT) 19 U/L      AST (SGOT) 23 U/L      Alkaline Phosphatase 51 U/L      Total Bilirubin 0.4 mg/dL      eGFR Non African Amer 131 mL/min/1.73      Globulin 2.7 gm/dL      A/G Ratio 1.6 g/dL      BUN/Creatinine Ratio 16.4     Anion Gap 11.0 mmol/L     Narrative:       GFR Normal >60  Chronic Kidney Disease <60  Kidney Failure <15      D-dimer, Quantitative [[id number]]  (Normal) Collected:  05/19/20 1530    Specimen:  Blood from Arm, Right Updated:  05/19/20 1552     D-Dimer, Quantitative <0.22 mg/L (FEU)     Narrative:       Reference Range is 0-0.50 mg/L FEU. However, results <0.50 mg/L FEU tends to rule out DVT or PE. Results >0.50 mg/L FEU are not useful in predicting absence or presence of DVT or PE.      BNP [[id number]]  (Normal) Collected:  05/19/20 1530    Specimen:  Blood from Arm, Right Updated:  05/19/20 1552     proBNP 30.8 pg/mL     Narrative:       Among patients with dyspnea, NT-proBNP is highly sensitive for the detection of acute congestive heart failure. In addition NT-proBNP of <300 pg/ml effectively rules out acute congestive heart failure with 99% negative predictive value.    Results may be falsely decreased if patient taking Biotin.      Troponin [[id number]]  (Normal) Collected:  05/19/20 1530    Specimen:  Blood from Arm, Right Updated:  05/19/20 1553     Troponin T <0.010 ng/mL     Narrative:        Troponin T Reference Range:  <= 0.03 ng/mL-   Negative for AMI  >0.03 ng/mL-     Abnormal for myocardial necrosis.  Clinicians would have to utilize clinical acumen, EKG, Troponin and serial changes to determine if it is an Acute Myocardial Infarction or myocardial injury due to an underlying chronic condition.       Results may be falsely decreased if patient taking Biotin.      CBC Auto Differential [[id number]]  (Normal) Collected:  05/19/20 1530    Specimen:  Blood from Arm, Right Updated:  05/19/20 1537     WBC 4.96 10*3/mm3      RBC 4.12 10*6/mm3      Hemoglobin 12.5 g/dL      Hematocrit 38.3 %      MCV 93.0 fL      MCH 30.3 pg      MCHC 32.6 g/dL      RDW 13.2 %      RDW-SD 45.3 fl      MPV 10.9 fL      Platelets 213 10*3/mm3      Neutrophil % 53.8 %      Lymphocyte % 35.9 %      Monocyte % 6.3 %      Eosinophil % 2.8 %      Basophil % 1.0 %      Immature Grans % 0.2 %      Neutrophils, Absolute 2.67 10*3/mm3      Lymphocytes, Absolute 1.78 10*3/mm3      Monocytes, Absolute 0.31 10*3/mm3      Eosinophils, Absolute 0.14 10*3/mm3      Basophils, Absolute 0.05 10*3/mm3      Immature Grans, Absolute 0.01 10*3/mm3      nRBC 0.0 /100 WBC         XR Chest 1 View   Final Result   1. No radiographic evidence of acute cardiopulmonary process.           This report was finalized on 05/19/2020 15:36 by Dr. Artemio Cheatham MD.        Labs are normal including troponin, d-dimer and BNP.  Chest x-ray was negative.  No cause for symptoms found.  Patient has tried numerous medications already with no improvement.  Patient will be discharged home to follow-up with PCP and pulmonary.  She is to return to the ER for any worsening or new symptoms or other concerns.  She had normal vital signs while here in the ER.  Patient agreeable.                                   HEART Score (for prediction of 6-week risk of major adverse cardiac event) reviewed and/or performed as part of the patient evaluation and treatment planning  process.  The result associated with this review/performance is: 0       MDM    Final diagnoses:   Chronic cough   Chronic dyspnea   Atypical chest pain            Dalila Campbell MD  05/19/20 8898

## 2020-06-19 ENCOUNTER — TRANSCRIBE ORDERS (OUTPATIENT)
Dept: ADMINISTRATIVE | Facility: HOSPITAL | Age: 30
End: 2020-06-19

## 2020-06-19 DIAGNOSIS — R10.11 RIGHT UPPER QUADRANT PAIN: Primary | ICD-10-CM

## 2020-06-23 ENCOUNTER — HOSPITAL ENCOUNTER (OUTPATIENT)
Dept: NUCLEAR MEDICINE | Facility: HOSPITAL | Age: 30
Discharge: HOME | End: 2020-06-23

## 2020-06-23 DIAGNOSIS — R10.11 RIGHT UPPER QUADRANT PAIN: ICD-10-CM

## 2020-06-23 PROCEDURE — A9537 TC99M MEBROFENIN: HCPCS | Performed by: PHYSICIAN ASSISTANT

## 2020-06-23 PROCEDURE — 78226 HEPATOBILIARY SYSTEM IMAGING: CPT

## 2020-06-23 PROCEDURE — 0 TECHNETIUM TC 99M MEBROFENIN KIT: Performed by: PHYSICIAN ASSISTANT

## 2020-06-23 RX ORDER — KIT FOR THE PREPARATION OF TECHNETIUM TC 99M MEBROFENIN 45 MG/10ML
1 INJECTION, POWDER, LYOPHILIZED, FOR SOLUTION INTRAVENOUS
Status: COMPLETED | OUTPATIENT
Start: 2020-06-23 | End: 2020-06-23

## 2020-06-23 RX ADMIN — MEBROFENIN 1 DOSE: 45 INJECTION, POWDER, LYOPHILIZED, FOR SOLUTION INTRAVENOUS at 13:19

## 2020-11-14 ENCOUNTER — NURSE TRIAGE (OUTPATIENT)
Dept: CALL CENTER | Facility: HOSPITAL | Age: 30
End: 2020-11-14

## 2020-11-14 NOTE — TELEPHONE ENCOUNTER
"Reviewed guideline with caller, advises she be seen within 4 hours. Caller agrees to follow care advice.     Reason for Disposition  • [1] SEVERE pain AND [2] not improved 2 hours after taking analgesic medication (e.g., ibuprofen or acetaminophen)    Additional Information  • Negative: Moving the earlobe or touching the ear clearly increases the pain  • Negative: Foreign body struck in the ear (e.g., bug, piece of cotton)  • Negative: Followed an ear injury  • Negative: [1] Recently diagnosed with otitis media AND [2] currently on oral antibiotics  • Negative: [1] Stiff neck (unable to touch chin to chest) AND [2] fever  • Negative: [1] Bony area of skull behind the ear is pink or swollen AND [2] fever  • Negative: Fever > 104 F (40 C)  • Negative: Patient sounds very sick or weak to the triager    Answer Assessment - Initial Assessment Questions  1. LOCATION: \"Which ear is involved?\"      Right ear   2. ONSET: \"When did the ear start hurting\"       Last Wednesday was seen in [facility] and treated for Ear infection, pain returned about 9:30 am   3. SEVERITY: \"How bad is the pain?\"  (Scale 1-10; mild, moderate or severe)    - MILD (1-3): doesn't interfere with normal activities     - MODERATE (4-7): interferes with normal activities or awakens from sleep     - SEVERE (8-10): excruciating pain, unable to do any normal activities       7/10  4. URI SYMPTOMS: \" Do you have a runny nose or cough?\"      Slight productive cough  5. FEVER: \"Do you have a fever?\" If so, ask: \"What is your temperature, how was it measured, and when did it start?\"      unknown  6. CAUSE: \"Have you been swimming recently?\", \"How often do you use Q-TIPS?\", \"Have you had any recent air travel or scuba diving?\"      no  7. OTHER SYMPTOMS: \"Do you have any other symptoms?\" (e.g., headache, stiff neck, dizziness, vomiting, runny nose, decreased hearing)      Headache, stiff neck, dizziness, nausea, decreased hearing  8. PREGNANCY: \"Is there any " "chance you are pregnant?\" \"When was your last menstrual period?\"      no    Protocols used: EARACHE-ADULT-      "

## 2020-12-23 ENCOUNTER — HOSPITAL ENCOUNTER (OUTPATIENT)
Dept: CT IMAGING | Facility: HOSPITAL | Age: 30
Discharge: HOME | End: 2020-12-23
Admitting: NURSE PRACTITIONER

## 2020-12-23 ENCOUNTER — TRANSCRIBE ORDERS (OUTPATIENT)
Dept: ADMINISTRATIVE | Facility: HOSPITAL | Age: 30
End: 2020-12-23

## 2020-12-23 DIAGNOSIS — H92.09 OTALGIA, UNSPECIFIED LATERALITY: Primary | ICD-10-CM

## 2020-12-23 DIAGNOSIS — H92.09 OTALGIA, UNSPECIFIED LATERALITY: ICD-10-CM

## 2020-12-23 PROCEDURE — 82565 ASSAY OF CREATININE: CPT

## 2020-12-23 PROCEDURE — 0 IOPAMIDOL PER 1 ML: Performed by: NURSE PRACTITIONER

## 2020-12-23 PROCEDURE — 70491 CT SOFT TISSUE NECK W/DYE: CPT

## 2020-12-23 RX ADMIN — IOPAMIDOL 100 ML: 755 INJECTION, SOLUTION INTRAVENOUS at 15:13

## 2021-01-07 LAB — CREAT BLDA-MCNC: 0.7 MG/DL (ref 0.6–1.3)

## 2021-01-21 ENCOUNTER — TELEMEDICINE (OUTPATIENT)
Dept: FAMILY MEDICINE CLINIC | Facility: CLINIC | Age: 31
End: 2021-01-21

## 2021-01-21 VITALS — RESPIRATION RATE: 20 BRPM | HEIGHT: 55 IN | WEIGHT: 106 LBS | BODY MASS INDEX: 24.53 KG/M2

## 2021-01-21 DIAGNOSIS — F90.2 ATTENTION DEFICIT HYPERACTIVITY DISORDER (ADHD), COMBINED TYPE: Primary | ICD-10-CM

## 2021-01-21 DIAGNOSIS — R53.83 FATIGUE, UNSPECIFIED TYPE: ICD-10-CM

## 2021-01-21 PROBLEM — F90.9 ATTENTION DEFICIT HYPERACTIVITY DISORDER: Status: ACTIVE | Noted: 2020-11-09

## 2021-01-21 PROCEDURE — 99214 OFFICE O/P EST MOD 30 MIN: CPT | Performed by: NURSE PRACTITIONER

## 2021-01-21 RX ORDER — DEXTROAMPHETAMINE SACCHARATE, AMPHETAMINE ASPARTATE MONOHYDRATE, DEXTROAMPHETAMINE SULFATE AND AMPHETAMINE SULFATE 6.25; 6.25; 6.25; 6.25 MG/1; MG/1; MG/1; MG/1
25 CAPSULE, EXTENDED RELEASE ORAL EVERY MORNING
Qty: 30 CAPSULE | Refills: 0 | Status: SHIPPED | OUTPATIENT
Start: 2021-02-18 | End: 2021-03-20

## 2021-01-21 RX ORDER — DEXTROAMPHETAMINE SACCHARATE, AMPHETAMINE ASPARTATE MONOHYDRATE, DEXTROAMPHETAMINE SULFATE AND AMPHETAMINE SULFATE 6.25; 6.25; 6.25; 6.25 MG/1; MG/1; MG/1; MG/1
25 CAPSULE, EXTENDED RELEASE ORAL EVERY MORNING
Qty: 30 CAPSULE | Refills: 0 | Status: SHIPPED | OUTPATIENT
Start: 2021-01-21 | End: 2021-02-20

## 2021-01-21 NOTE — PROGRESS NOTES
"Chief Complaint  Medication Refill for ADHD  Subjective          Laura Morales presents to [facility] for   Needing medication refills on ADHD.       Objective   Vital Signs:   Resp 20   Ht 62 cm (24.41\")   Wt 48.1 kg (106 lb)   .09 kg/m²     Physical Exam  Constitutional:       Appearance: Normal appearance. She is well-developed.   HENT:      Head: Normocephalic and atraumatic.      Right Ear: External ear normal.      Left Ear: External ear normal.      Nose: Nose normal. No nasal tenderness or congestion.      Mouth/Throat:      Lips: Pink. No lesions.      Mouth: Mucous membranes are moist. No oral lesions.      Dentition: Normal dentition.      Pharynx: Oropharynx is clear. No pharyngeal swelling, oropharyngeal exudate or posterior oropharyngeal erythema.   Eyes:      General: Lids are normal. Vision grossly intact. No scleral icterus.        Right eye: No discharge.         Left eye: No discharge.      Extraocular Movements: Extraocular movements intact.      Conjunctiva/sclera: Conjunctivae normal.      Right eye: Right conjunctiva is not injected.      Left eye: Left conjunctiva is not injected.      Pupils: Pupils are equal, round, and reactive to light.   Neck:      Musculoskeletal: Full passive range of motion without pain, normal range of motion and neck supple.   Cardiovascular:      Rate and Rhythm: Normal rate and regular rhythm.      Heart sounds: Normal heart sounds. No murmur. No gallop.    Pulmonary:      Effort: Pulmonary effort is normal.      Breath sounds: Normal breath sounds and air entry. No wheezing, rhonchi or rales.   Musculoskeletal: Normal range of motion.         General: No tenderness or deformity.      Right lower leg: No edema.      Left lower leg: No edema.   Skin:     General: Skin is warm and dry.      Coloration: Skin is not jaundiced.      Findings: No rash.   Neurological:      Mental Status: She is alert and oriented to person, " place, and time.      Cranial Nerves: Cranial nerves are intact.      Sensory: Sensation is intact.      Motor: Motor function is intact.      Coordination: Coordination is intact.      Gait: Gait is intact.   Psychiatric:         Attention and Perception: Attention normal.         Mood and Affect: Mood and affect normal.         Behavior: Behavior is not hyperactive. Behavior is cooperative.         Thought Content: Thought content normal.         Judgment: Judgment normal.        Result Review :                 Assessment and Plan    Problem List Items Addressed This Visit        Mental Health    Attention deficit hyperactivity disorder - Primary      Other Visit Diagnoses     Fatigue, unspecified type          Patient doing well on medications.  She is requesting a refill on her Adderall.  Patient also mentions fatigue and is requesting a vitamin B12 injection.  Patient does not have any other primary care doctor that she follows with and states she considers as her PCP.  I discussed the need for a wellness exam.  I recommended the patient come back in office for that and we can get labs to further address the fatigue and decide on whether or not she would be a candidate for B12 injections.  Patient will also be needing a Covid test towards the end of February due to a surgery that she will be having out of town.  She will call back to schedule a telehealth appointment closer to that time.  Reji is clean.  Drug screen will need to be collected at next in office visit.    Follow Up   Return in about 3 months (around 4/21/2021).  Patient was given instructions and counseling regarding her condition or for health maintenance advice. Please see specific information pulled into the AVS if appropriate.

## 2021-02-01 ENCOUNTER — OFFICE VISIT (OUTPATIENT)
Dept: OTOLARYNGOLOGY | Facility: CLINIC | Age: 31
End: 2021-02-01

## 2021-02-01 VITALS
HEIGHT: 62 IN | SYSTOLIC BLOOD PRESSURE: 107 MMHG | BODY MASS INDEX: 19.88 KG/M2 | DIASTOLIC BLOOD PRESSURE: 74 MMHG | WEIGHT: 108 LBS | HEART RATE: 84 BPM

## 2021-02-01 DIAGNOSIS — H92.01 OTALGIA, RIGHT: Primary | ICD-10-CM

## 2021-02-01 DIAGNOSIS — M79.18 MYOFASCIAL PAIN ON RIGHT SIDE: ICD-10-CM

## 2021-02-01 DIAGNOSIS — J34.2 ACQUIRED DEVIATED NASAL SEPTUM: ICD-10-CM

## 2021-02-01 DIAGNOSIS — J34.89 CONCHA BULLOSA: ICD-10-CM

## 2021-02-01 PROCEDURE — 99204 OFFICE O/P NEW MOD 45 MIN: CPT | Performed by: OTOLARYNGOLOGY

## 2021-02-01 RX ORDER — FLUTICASONE PROPIONATE 50 MCG
2 SPRAY, SUSPENSION (ML) NASAL 2 TIMES DAILY
Qty: 48 G | Refills: 3 | Status: SHIPPED | OUTPATIENT
Start: 2021-02-01 | End: 2021-07-26 | Stop reason: SDUPTHER

## 2021-02-01 RX ORDER — OXYMETAZOLINE HYDROCHLORIDE 0.05 G/100ML
2 SPRAY NASAL 3 TIMES DAILY
Qty: 2 ML | Refills: 0 | Status: SHIPPED | OUTPATIENT
Start: 2021-02-01 | End: 2021-02-04

## 2021-02-01 NOTE — PATIENT INSTRUCTIONS
Afrin use:  Use 2 puffs each nostril 3 times daily for 3 days only!!  Stop using after 3 days unless instructed to use longer    Nasal steroid use:  Using nasal steroids:  You will be prescribed one of the following nasal steroids: Flonase, Nasacort, Nasonex, Rhinocort, Qnasl, Zetonna  2 puffs each nostril 2 times daily  Start as soon as possible    Use Afrin first and wait 10 minutes to allow the nose to open. Then administer nasal steroids.    TEMPOROMANDIBULAR JOINT EXERCISES     The temporomandibular joint, or the TMJ, is the jaw joint. This joint can frequently be a source of pain in the head and neck region. Typically because of its location, pain is felt either in area just in front of the ear, or in the ear itself. However, pain in the TMJ can be referred to any part of the head and neck.     An examination by the physician frequently reveals tenderness around the joint. Oftentimes, a crack or pop can also be felt. This may be an indication that the pain is in all actuality coming from the joint. An exhaustive search for a cause is carried out, in an effort to determine the etiology of the pain. Pain can be caused by grinding of teeth at night (bruxism), overuse (gum chewing, ice cracking, over opening mouth), poor dentition and malocclusion (bad bite). In an attempt to be thorough, your physician may involve others who have experience in this field, such as oral surgeons, dentists and pain experts.     Should you be diagnosed with TMJ pain, a course of conservative treatment is indicated, as this works in an overwhelming majority of cases. Because this pain originates from a joint, the treatment is similar to treatment for pain in other joints.  Treatment     Rest:  This is indicated to relieve the pressure on the joint. No chewing gum, tough meat, hard bread, cracking ice in the teeth, or any other activity that places undue stress on the joint. Simply, if it causes it to hurt, stop doing it. This may be  required for an unspecified period of time, usually 1 to 2 weeks.     Cold to the area:  This will reduce swelling and pain early in the course.     Heat to the area:  This improves blood flow to the area and speeds healing.     Pain Relievers:  Anti-inflammatory medications, such as aspirin, Motrin, Advil, Nuprin, Aleve or any other products in this category are satisfactory to use in the face of joint pain. Rarely are narcotics required, except possibly in the acute phase to gain some initial relief.  Dr. Betts may administer pain blocks to relieve severe pain and spasm. Nerve blocks may also be used.    Recommendations:  ?       Reduce/eliminate caffeinated drinks  ?       Reduce high sugar drinks and foods  ?       Get plenty of rest  ?       Practice relaxation techniques; Yoga, Biofeedback, Ron Chi, etc.  ?       Exercise for overall fitness  ?       Eat healthy- High fiber, low fat/cholesterol eating, and change eating habits. We recommend Sugar Busters as a lifestyle change for eating.  ?       See your dentist regularly for checkups and bite checks.  Rehabilitation:  Once the acute pain has been controlled, you should begin exercises to strengthen and rehabilitate the TMJ.     Exercises: These should be performed for five minutes at least five times each day     Slowly open the mouth to its fullest extent, even using the fingers to exert gentle pressure.     Open and close the mouth as widely and rapidly as possible.     Open and close the mouth against resistance by placing the open palm underneath the chin, or the fingers on top of the chin.     Move the lower jaw side to side without resistance. Later, add resistance by placing both hands on either side of the jaw.     Push (protrude) the lower jaw without resistance. Later add resistance.     Should the above regimen fail to lead to improvement, your physician will discuss with you other forms of therapy. Since almost all types of TMJ pain respond to  conservative therapy, surgery is indicated only after exhausting the rehabilitation. Dr. Betts does not perform rehabilitative surgery on the temporomandibular joint, but refers patients to an oral surgery who specialize in this type of surgery.    Aleve one tablet 2 times daily  Heat to R neck and ear    EAR CARE: :using oil  Use a hair dryer on low heat and blow into the ear canals 2 times daily to keep ears dry.  DO Not use Q tips or Evon pins, ever!!  Do not use alcohol in the ear canal (this will cause dryness and itching)  NO peroxide or alcohol in ears  Oil: Use Sweet oil, Olive oil, or Mineral oil, purchased over the counter, once a week, Do not use Q tips, You may use a hair dryer on low heat. Blow in ears for 10-15 seconds 2 times daily to dry ear canals or if ear canals are itching.    Stop Q tips     [email or web address]

## 2021-02-01 NOTE — PROGRESS NOTES
[facility] OTOLARYNGOLOGY, HEAD AND NECK SURGERY CLINIC NOTE    Chief Complaint   Patient presents with   • Deviated Septum          HPI   New Patient  Accompanied by:  son  Laura Morales is a  30 y.o. female with bad ear pain.  She had ear pain in Dec. Told ear infection.  She has had numerous ear infections.  She has been referred for DNS found on CT. Breathing is not good. She has had breathing issues for over a year. This started a lot of Drs visit. Dec [id number] had bad breathing issues. Has seen Pulm.  Denies pain with chewing.  Denies bruxing  No clenching  Smoke- recent quit smoked 5 a day x 12 yrs  Drink- rarely  R ear hurts today and down into neck. She has tunnel hearing as well.  Q tips- she uses frequently      History     Last Reviewed by Mart Betts Jr., MD on 2/1/2021 at  3:13 PM    Sections Reviewed    Medical, Family, Tobacco, Surgical      Problem list reviewed by Mart Betts Jr., MD on 2/1/2021 at  3:13 PM  Medicines reviewed by Mart Betts Jr., MD on 2/1/2021 at  3:13 PM  Allergies reviewed by Mart Betts Jr., MD on 2/1/2021 at  3:13 PM         Vital Signs:   Heart Rate:  [84] 84  BP: (107)/(74) 107/74    Physical Exam  CONSTITUTIONAL:    well nourished, well-developed, alert, oriented, in no acute distress     BODY HABITUS:    Normal body habitus    COMMUNICATION:    able to communicate normally, normal voice quality    HEAD:     Normocephalic, without obvious abnormality, atraumatic    FACE:    structure normal, no tenderness present, no lesions/masses, no evidence of trauma    SALIVARY GLANDS:    parotid glands with no tenderness, no swelling, no masses, submandibular glands with normal size, nontender     EYE:    ocular motility normal, eyelids normal, orbits normal, no proptosis, conjunctiva clear, sclera non-icteric, pupils equal, round, reactive to light and accomodation  Color:   blue    HEARING:    response to conversational voice  normal    EARS:    Otoscopic exam   normal pinna with no lesions, Canals normal size and shape, Tympanic membranes normal, Ossicular chain intact, Middle ear clear     NOSE EXTERNAL:    APPEARANCE: normal, straight, with good projection, no tenderness, no lesions, no tenderness, good nasal support, patent nares    NOSE INTERNAL:    Anterior rhinoscopy   NASALMUCOSA:    abnormal:        Bilateral- Mildly thick, mildly red    NASAL PASSAGES:     abnormal   Left- Moderate to severe obstruction in the mid septal area causing nasal passage obstruction, Right- Very narrowed especially at the inferior turbinate area   NASAL VALVE:     intact, good support and no nasal obstruction   SEPTUM:     mucosa abnormal   Bilateral- Red, thick     deviation present:      deviated Left Mid septum moderate to severe   INFERIOR TURBINATES:     abnormal  Bilateral- Enlarged, red, boggy, obstructing     ORAL CAVITY:    Normal lips with no lesions, dentition normal for age, FOM intact without lesions and normal salivary flow, Mucosa intact without lesions, Hard and soft palate normal without lesions    OROPHARYNX:    Direct examination  oropharyngeal mucosa normal, tonsil(s) with normal appearance      NECK:    normal appearance, no masses, no lesions, larynx normal mobility, trachea midline  thin    LYMPH NODES :    no adenopathy    THYROID:    no overt thyromegaly, no tenderness, nodules or mass present on palpation, position midline    CHEST/RESPIRATORY:    respiratory effort normal, no rales, rubs or wheezing, no stridor, normal appearance to chest    CARDIOVASCULAR:    regular rate and rhythm, no murmurs, gallups, no peripheral edema    NEURO/PSYCHIATRIC :    oriented appropriately for age, mood normal, affect appropriate, cranial nerves intact grossly (unless specifically described), gait normal for age        SnapShot   Notes   Encounters  Labs   Imaging   Meds   Media   Rslt Rev   :23}    Result Review    RESULTS REVIEW:    I have  reviewed the patients old records in the chart.  I reviewed the patient's new imaging results and agree with the interpretation.      REFERRAL/PRE-AUTH CSN - SCAN - ENT REFERRAL (2020)  CT Soft Tissue Neck With Contrast (2020 15:26)        Assessment:        Diagnosis Plan   1. Otalgia, right      For myofascial pain  There is no sign of infection   2. Myofascial pain on right side      Especially in the digastric muscle, radiation into the sternocleidomastoid muscle   3. Acquired deviated nasal septum      R to L mid mod   4. Anjel bullosa      R large  L mild              Plan:        Conservative management.  Patient appears to have myofascial pain causing her right ear pain.  She has tenderness over her digastric area with radiation up and down the neck.  Her tympanic membranes are normal bilaterally with no evidence of fluid.  I suspect she has tensor tympani spasm causing her hearing to be muffled.  Right ear pain is from myofascial pain.  I do not detect any TMJ.  She should refrain from taking antibiotics for any of this.  I will begin TMJ recommendations.  She does have a deviated septum with nasal turbinate hypertrophy.  She has anjel bullosa as well.  I feel she will progress to surgery.  I will start her on nasal steroids, TMJ recommendations Aleve twice daily, and heat.  If she does not improve I will consider dental consultation and surgery on her septum.  I have had an in-depth discussion with the patient regarding my thoughts on her source of ear pain.  She is agreeable to my therapeutic approach at this point time.  Afrin x 3 days  Flonase BID  TMJ recommendations  Aleve BID  Heat to Jaws     New Medications Ordered This Visit   Medications   • oxymetazoline (AFRIN) 0.05 % nasal spray     Si sprays into the nostril(s) as directed by provider 3 (Three) Times a Day for 3 days. Use for 3 days then stop     Dispense:  2 mL     Refill:  0   • fluticasone (FLONASE) 50 MCG/ACT nasal spray      Si sprays into the nostril(s) as directed by provider 2 (Two) Times a Day.     Dispense:  48 g     Refill:  3          MY CHART:  Patient is Patient is using My Chart    Patient understand(s) and agree(s) with the treatment plan as described.    Return in about 6 weeks (around 3/15/2021) for Recheck Nose, ETs Nasal scope.            Mart Betts Jr, MD  21  15:19 CST

## 2021-03-08 ENCOUNTER — TRANSCRIBE ORDERS (OUTPATIENT)
Dept: LAB | Facility: HOSPITAL | Age: 31
End: 2021-03-08

## 2021-03-10 PROCEDURE — 88304 TISSUE EXAM BY PATHOLOGIST: CPT | Performed by: SURGERY

## 2021-03-11 ENCOUNTER — LAB REQUISITION (OUTPATIENT)
Dept: LAB | Facility: HOSPITAL | Age: 31
End: 2021-03-11

## 2021-03-11 DIAGNOSIS — Z00.00 ENCOUNTER FOR GENERAL ADULT MEDICAL EXAMINATION WITHOUT ABNORMAL FINDINGS: ICD-10-CM

## 2021-03-12 LAB
CYTO UR: NORMAL
LAB AP CASE REPORT: NORMAL
LAB AP CLINICAL INFORMATION: NORMAL
PATH REPORT.FINAL DX SPEC: NORMAL
PATH REPORT.GROSS SPEC: NORMAL

## 2021-03-15 ENCOUNTER — OFFICE VISIT (OUTPATIENT)
Dept: OTOLARYNGOLOGY | Facility: CLINIC | Age: 31
End: 2021-03-15

## 2021-03-15 DIAGNOSIS — M79.18 MYOFASCIAL PAIN ON RIGHT SIDE: ICD-10-CM

## 2021-03-15 DIAGNOSIS — H92.01 OTALGIA, RIGHT: Primary | ICD-10-CM

## 2021-03-15 DIAGNOSIS — J34.89 CONCHA BULLOSA: ICD-10-CM

## 2021-03-15 DIAGNOSIS — J34.2 ACQUIRED DEVIATED NASAL SEPTUM: ICD-10-CM

## 2021-03-15 PROCEDURE — 31231 NASAL ENDOSCOPY DX: CPT | Performed by: OTOLARYNGOLOGY

## 2021-03-15 PROCEDURE — 99214 OFFICE O/P EST MOD 30 MIN: CPT | Performed by: OTOLARYNGOLOGY

## 2021-03-15 RX ORDER — DIAZEPAM 5 MG/1
TABLET ORAL
COMMUNITY
Start: 2021-03-02 | End: 2021-03-17

## 2021-03-15 RX ORDER — OXYMETAZOLINE HYDROCHLORIDE 0.05 G/100ML
2 SPRAY NASAL
Status: CANCELLED | OUTPATIENT
Start: 2021-03-15

## 2021-03-15 RX ORDER — GABAPENTIN 300 MG/1
300 CAPSULE ORAL 3 TIMES DAILY
COMMUNITY
Start: 2021-02-25 | End: 2021-03-17

## 2021-03-15 RX ORDER — OXYMETAZOLINE HYDROCHLORIDE 0.05 G/100ML
2 SPRAY NASAL ONCE
Status: CANCELLED | OUTPATIENT
Start: 2021-03-15

## 2021-03-15 RX ORDER — OXYMETAZOLINE HYDROCHLORIDE 0.05 G/100ML
2 SPRAY NASAL
Status: CANCELLED | OUTPATIENT
Start: 2021-03-15 | End: 2021-03-15

## 2021-03-17 ENCOUNTER — LAB (OUTPATIENT)
Dept: LAB | Facility: HOSPITAL | Age: 31
End: 2021-03-17

## 2021-03-17 ENCOUNTER — TRANSCRIBE ORDERS (OUTPATIENT)
Dept: ADMINISTRATIVE | Facility: HOSPITAL | Age: 31
End: 2021-03-17

## 2021-03-17 ENCOUNTER — APPOINTMENT (OUTPATIENT)
Dept: PREADMISSION TESTING | Facility: HOSPITAL | Age: 31
End: 2021-03-17

## 2021-03-17 VITALS
BODY MASS INDEX: 20.28 KG/M2 | OXYGEN SATURATION: 100 % | DIASTOLIC BLOOD PRESSURE: 89 MMHG | SYSTOLIC BLOOD PRESSURE: 125 MMHG | RESPIRATION RATE: 16 BRPM | WEIGHT: 110.23 LBS | HEIGHT: 62 IN | HEART RATE: 91 BPM

## 2021-03-17 DIAGNOSIS — Z11.59 SCREENING FOR VIRAL DISEASE: Primary | ICD-10-CM

## 2021-03-17 PROBLEM — J34.89 CONCHA BULLOSA: Status: ACTIVE | Noted: 2021-03-17

## 2021-03-17 PROBLEM — J34.2 ACQUIRED DEVIATED NASAL SEPTUM: Status: ACTIVE | Noted: 2021-03-17

## 2021-03-17 LAB
B-HCG UR QL: NEGATIVE
DEPRECATED RDW RBC AUTO: 41.4 FL (ref 37–54)
ERYTHROCYTE [DISTWIDTH] IN BLOOD BY AUTOMATED COUNT: 12 % (ref 12.3–15.4)
HCT VFR BLD AUTO: 37.2 % (ref 34–46.6)
HGB BLD-MCNC: 12.5 G/DL (ref 12–15.9)
MCH RBC QN AUTO: 31.5 PG (ref 26.6–33)
MCHC RBC AUTO-ENTMCNC: 33.6 G/DL (ref 31.5–35.7)
MCV RBC AUTO: 93.7 FL (ref 79–97)
PLATELET # BLD AUTO: 244 10*3/MM3 (ref 140–450)
PMV BLD AUTO: 11.3 FL (ref 6–12)
RBC # BLD AUTO: 3.97 10*6/MM3 (ref 3.77–5.28)
SARS-COV-2 RNA PNL SPEC NAA+PROBE: NOT DETECTED
WBC # BLD AUTO: 8.11 10*3/MM3 (ref 3.4–10.8)

## 2021-03-17 PROCEDURE — 87635 SARS-COV-2 COVID-19 AMP PRB: CPT | Performed by: OTOLARYNGOLOGY

## 2021-03-17 PROCEDURE — 85027 COMPLETE CBC AUTOMATED: CPT

## 2021-03-17 PROCEDURE — 36415 COLL VENOUS BLD VENIPUNCTURE: CPT

## 2021-03-17 PROCEDURE — 81025 URINE PREGNANCY TEST: CPT | Performed by: OTOLARYNGOLOGY

## 2021-03-17 PROCEDURE — C9803 HOPD COVID-19 SPEC COLLECT: HCPCS | Performed by: OTOLARYNGOLOGY

## 2021-03-17 NOTE — DISCHARGE INSTRUCTIONS
DAY OF SURGERY INSTRUCTIONS        YOUR SURGEON: ***BRAYDEN ZHAO    PROCEDURE: ***SEPTOPLASTY    DATE OF SURGERY: ***MARCH 18,2021    ARRIVAL TIME: AS DIRECTED BY OFFICE    YOU MAY TAKE THE FOLLOWING MEDICATION(S) THE MORNING OF SURGERY WITH A SIP OF WATER: ***NONE    ALL OTHER HOME MEDICATIONS CHECK WITH YOUR DOCTOR    DO NOT TAKE ANY ERECTILE DYSFUNCTION MEDICATIONS (EX:  CIALIS, VIAGRA) 24 HOURS PRIOR TO SURGERY              MANAGING PAIN AFTER SURGERY    We know you are probably wondering what your pain will be like after surgery.  Following surgery it is unrealistic to expect you will not have pain.   Pain is how our bodies let us know that something is wrong or cautions us to be careful.  That said, our goal is to make your pain tolerable.    Methods we may use to treat your pain include (oral or IV medications, PCAs, epidurals, nerve blocks, etc.)   While some procedures require IV pain medications for a short time after surgery, transitioning to pain medications by mouth allows for better management of pain.   Your nurse will encourage you to take oral pain medications whenever possible.  IV medications work almost immediately, but only last a short while.  Taking medications by mouth allows for a more constant level of medication in your blood stream for a longer period of time.      Once your pain is out of control it is harder to get back under control.  It is important you are aware when your next dose of pain medication is due.  If you are admitted, your nurse may write the time of your next dose on the white board in your room to help you remember.      We are interested in your pain and encourage you to inform us about aggravating factors during your visit.   Many times a simple repositioning every few hours can make a big difference.    If your physician says it is okay, do not let your pain prevent you from getting out of bed. Be sure to call your nurse for assistance prior to getting up so you do  not fall.      Before surgery, please decide your tolerable pain goal.  These faces help describe the pain ratings we use on a 0-10 scale.   Be prepared to tell us your goal and whether or not you take pain or anxiety medications at home.      BEFORE YOU COME TO THE HOSPITAL  (Pre-op instructions)  • Do not eat, drink, smoke or chew gum after midnight the night before surgery.  This also includes no mints.  • Morning of surgery take only the medicines you have been instructed with a sip of water unless otherwise instructed  by your physician.  • Do not shave, wear makeup or dark nail polish.  • Remove all jewelry including rings.  • Leave anything you consider valuable at home.  • Leave your suitcase in the car until after your surgery.  • Bring the following with you if applicable:  o Picture ID and insurance, Medicare or Medicaid cards  o Co-pay/deductible required by insurance (cash, check, credit card)  o Copy of advance directive, living will or power-of- documents if not brought to PAT  o CPAP or BIPAP mask and tubing  o Relaxation aids ( book, magazine), etc.  o Hearing aids                                 ON THE DAY OF SURGERY  · On the day of surgery check in at registration located at the main entrance of the hospital.   ? You will be registered and given a beeper with instructions where to wait in the main lobby.  ? When your beeper lights up and vibrates a member of the Outpatient Surgery staff will meet you at the double doors under the stair steps and escort you to your preoperative room.   · You may have cloth compression devices placed on your legs. These help to prevent blood clots and reduce swelling in your legs.  · An IV may be inserted into one of your veins.  · In the operating room, you may be given one or more of the following:  ? A medicine to help you relax (sedative).  ? A medicine to numb the area (local anesthetic).  ? A medicine to make you fall asleep (general anesthetic).  ? A  "medicine that is injected into an area of your body to numb everything below the injection site (regional anesthetic).  · Your surgical site will be marked or identified.  · You may be given an antibiotic through your IV to help prevent infection.  Contact a health care provider if you:  · Develop a fever of more than 100.4°F (38°C) or other feelings of illness during the 48 hours before your surgery.  · Have symptoms that get worse.  Have questions or concerns about your surgery    General Anesthesia/Surgery, Adult  General anesthesia is the use of medicines to make a person \"go to sleep\" (unconscious) for a medical procedure. General anesthesia must be used for certain procedures, and is often recommended for procedures that:  · Last a long time.  · Require you to be still or in an unusual position.  · Are major and can cause blood loss.  The medicines used for general anesthesia are called general anesthetics. As well as making you unconscious for a certain amount of time, these medicines:  · Prevent pain.  · Control your blood pressure.  · Relax your muscles.  Tell a health care provider about:  · Any allergies you have.  · All medicines you are taking, including vitamins, herbs, eye drops, creams, and over-the-counter medicines.  · Any problems you or family members have had with anesthetic medicines.  · Types of anesthetics you have had in the past.  · Any blood disorders you have.  · Any surgeries you have had.  · Any medical conditions you have.  · Any recent upper respiratory, chest, or ear infections.  · Any history of:  ? Heart or lung conditions, such as heart failure, sleep apnea, asthma, or chronic obstructive pulmonary disease (COPD).  ?  service.  ? Depression or anxiety.  · Any tobacco or drug use, including marijuana or alcohol use.  · Whether you are pregnant or may be pregnant.  What are the risks?  Generally, this is a safe procedure. However, problems may occur, including:  · Allergic " reaction.  · Lung and heart problems.  · Inhaling food or liquid from the stomach into the lungs (aspiration).  · Nerve injury.  · Air in the bloodstream, which can lead to stroke.  · Extreme agitation or confusion (delirium) when you wake up from the anesthetic.  · Waking up during your procedure and being unable to move. This is rare.  These problems are more likely to develop if you are having a major surgery or if you have an advanced or serious medical condition. You can prevent some of these complications by answering all of your health care provider's questions thoroughly and by following all instructions before your procedure.  General anesthesia can cause side effects, including:  · Nausea or vomiting.  · A sore throat from the breathing tube.  · Hoarseness.  · Wheezing or coughing.  · Shaking chills.  · Tiredness.  · Body aches.  · Anxiety.  · Sleepiness or drowsiness.  · Confusion or agitation.  RISKS AND COMPLICATIONS OF SURGERY  Your health care provider will discuss possible risks and complications with you before surgery. Common risks and complications include:    · Problems due to the use of anesthetics.  · Blood loss and replacement (does not apply to minor surgical procedures).  · Temporary increase in pain due to surgery.  · Uncorrected pain or problems that the surgery was meant to correct.  · Infection.  · New damage.    What happens before the procedure?    Medicines  Ask your health care provider about:  · Changing or stopping your regular medicines. This is especially important if you are taking diabetes medicines or blood thinners.  · Taking medicines such as aspirin and ibuprofen. These medicines can thin your blood. Do not take these medicines unless your health care provider tells you to take them.  · Taking over-the-counter medicines, vitamins, herbs, and supplements. Do not take these during the week before your procedure unless your health care provider approves them.  General  instructions  · Starting 3-6 weeks before the procedure, do not use any products that contain nicotine or tobacco, such as cigarettes and e-cigarettes. If you need help quitting, ask your health care provider.  · If you brush your teeth on the morning of the procedure, make sure to spit out all of the toothpaste.  · Tell your health care provider if you become ill or develop a cold, cough, or fever.  · If instructed by your health care provider, bring your sleep apnea device with you on the day of your surgery (if applicable).  · Ask your health care provider if you will be going home the same day, the following day, or after a longer hospital stay.  ? Plan to have someone take you home from the hospital or clinic.  ? Plan to have a responsible adult care for you for at least 24 hours after you leave the hospital or clinic. This is important.  What happens during the procedure?  · You will be given anesthetics through both of the following:  ? A mask placed over your nose and mouth.  ? An IV in one of your veins.  · You may receive a medicine to help you relax (sedative).  · After you are unconscious, a breathing tube may be inserted down your throat to help you breathe. This will be removed before you wake up.  · An anesthesia specialist will stay with you throughout your procedure. He or she will:  ? Keep you comfortable and safe by continuing to give you medicines and adjusting the amount of medicine that you get.  ? Monitor your blood pressure, pulse, and oxygen levels to make sure that the anesthetics do not cause any problems.  The procedure may vary among health care providers and hospitals.  What happens after the procedure?  · Your blood pressure, temperature, heart rate, breathing rate, and blood oxygen level will be monitored until the medicines you were given have worn off.  · You will wake up in a recovery area. You may wake up slowly.  · If you feel anxious or agitated, you may be given medicine to  help you calm down.  · If you will be going home the same day, your health care provider may check to make sure you can walk, drink, and urinate.  · Your health care provider will treat any pain or side effects you have before you go home.  · Do not drive for 24 hours if you were given a sedative.  Summary  · General anesthesia is used to keep you still and prevent pain during a procedure.  · It is important to tell your healthcare provider about your medical history and any surgeries you have had, and previous experience with anesthesia.  · Follow your healthcare provider’s instructions about when to stop eating, drinking, or taking certain medicines before your procedure.  · Plan to have someone take you home from the hospital or clinic.  This information is not intended to replace advice given to you by your health care provider. Make sure you discuss any questions you have with your health care provider.  Document Released: 03/26/2009 Document Revised: 08/03/2018 Document Reviewed: 08/03/2018  [employer] Interactive Patient Education © 2019 [employer]      Fall Prevention in Hospitals, Adult  As a hospital patient, your condition and the treatments you receive can increase your risk for falls. Some additional risk factors for falls in a hospital include:  · Being in an unfamiliar environment.  · Being on bed rest.  · Your surgery.  · Taking certain medicines.  · Your tubing requirements, such as intravenous (IV) therapy or catheters.  It is important that you learn how to decrease fall risks while at the hospital. Below are important tips that can help prevent falls.  SAFETY TIPS FOR PREVENTING FALLS  Talk about your risk of falling.  · Ask your health care provider why you are at risk for falling. Is it your medicine, illness, tubing placement, or something else?  · Make a plan with your health care provider to keep you safe from falls.  · Ask your health care provider or pharmacist about side effects of your  medicines. Some medicines can make you dizzy or affect your coordination.  Ask for help.  · Ask for help before getting out of bed. You may need to press your call button.  · Ask for assistance in getting safely to the toilet.  · Ask for a walker or cane to be put at your bedside. Ask that most of the side rails on your bed be placed up before your health care provider leaves the room.  · Ask family or friends to sit with you.  · Ask for things that are out of your reach, such as your glasses, hearing aids, telephone, bedside table, or call button.  Follow these tips to avoid falling:  · Stay lying or seated, rather than standing, while waiting for help.  · Wear rubber-soled slippers or shoes whenever you walk in the hospital.  · Avoid quick, sudden movements.  ¨ Change positions slowly.  ¨ Sit on the side of your bed before standing.  ¨ Stand up slowly and wait before you start to walk.  · Let your health care provider know if there is a spill on the floor.  · Pay careful attention to the medical equipment, electrical cords, and tubes around you.  · When you need help, use your call button by your bed or in the bathroom. Wait for one of your health care providers to help you.  · If you feel dizzy or unsure of your footing, return to bed and wait for assistance.  · Avoid being distracted by the TV, telephone, or another person in your room.  · Do not lean or support yourself on rolling objects, such as IV poles or bedside tables.     This information is not intended to replace advice given to you by your health care provider. Make sure you discuss any questions you have with your health care provider.     Document Released: 12/15/2001 Document Revised: 01/08/2016 Document Reviewed: 08/25/2013  [employer] Interactive Patient Education ©2016 [employer]            PATIENT/FAMILY/RESPONSIBLE PARTY VERBALIZES UNDERSTANDING OF ABOVE EDUCATION.  COPY OF PAIN SCALE GIVEN AND REVIEWED WITH VERBALIZED UNDERSTANDING.

## 2021-03-18 ENCOUNTER — ANESTHESIA (OUTPATIENT)
Dept: PERIOP | Facility: HOSPITAL | Age: 31
End: 2021-03-18

## 2021-03-18 ENCOUNTER — ANESTHESIA EVENT (OUTPATIENT)
Dept: PERIOP | Facility: HOSPITAL | Age: 31
End: 2021-03-18

## 2021-03-18 ENCOUNTER — HOSPITAL ENCOUNTER (OUTPATIENT)
Facility: HOSPITAL | Age: 31
Setting detail: HOSPITAL OUTPATIENT SURGERY
Discharge: HOME | End: 2021-03-18
Attending: OTOLARYNGOLOGY | Admitting: OTOLARYNGOLOGY

## 2021-03-18 VITALS
TEMPERATURE: 98 F | WEIGHT: 110.23 LBS | OXYGEN SATURATION: 99 % | DIASTOLIC BLOOD PRESSURE: 85 MMHG | SYSTOLIC BLOOD PRESSURE: 112 MMHG | HEART RATE: 70 BPM | RESPIRATION RATE: 16 BRPM | BODY MASS INDEX: 20.28 KG/M2 | HEIGHT: 62 IN

## 2021-03-18 DIAGNOSIS — Z98.890 S/P NASAL SEPTOPLASTY: Primary | ICD-10-CM

## 2021-03-18 DIAGNOSIS — J34.2 ACQUIRED DEVIATED NASAL SEPTUM: ICD-10-CM

## 2021-03-18 DIAGNOSIS — J34.89 CONCHA BULLOSA: ICD-10-CM

## 2021-03-18 PROCEDURE — 25010000002 PROPOFOL 10 MG/ML EMULSION: Performed by: NURSE ANESTHETIST, CERTIFIED REGISTERED

## 2021-03-18 PROCEDURE — 25010000002 COCAINE HCL 40 MG/ML SOLUTION: Performed by: OTOLARYNGOLOGY

## 2021-03-18 PROCEDURE — C9046 COCAINE HCL NASAL SOLUTION: HCPCS | Performed by: OTOLARYNGOLOGY

## 2021-03-18 PROCEDURE — 30130 EXCISE INFERIOR TURBINATE: CPT | Performed by: OTOLARYNGOLOGY

## 2021-03-18 PROCEDURE — 25010000002 MIDAZOLAM PER 1 MG: Performed by: ANESTHESIOLOGY

## 2021-03-18 PROCEDURE — 30999 UNLISTED PROCEDURE NOSE: CPT | Performed by: OTOLARYNGOLOGY

## 2021-03-18 PROCEDURE — 30520 REPAIR OF NASAL SEPTUM: CPT | Performed by: OTOLARYNGOLOGY

## 2021-03-18 PROCEDURE — 25010000002 DEXAMETHASONE PER 1 MG: Performed by: OTOLARYNGOLOGY

## 2021-03-18 RX ORDER — LIDOCAINE HYDROCHLORIDE 10 MG/ML
0.5 INJECTION, SOLUTION EPIDURAL; INFILTRATION; INTRACAUDAL; PERINEURAL ONCE AS NEEDED
Status: DISCONTINUED | OUTPATIENT
Start: 2021-03-18 | End: 2021-03-18 | Stop reason: HOSPADM

## 2021-03-18 RX ORDER — NALOXONE HCL 0.4 MG/ML
0.4 VIAL (ML) INJECTION AS NEEDED
Status: DISCONTINUED | OUTPATIENT
Start: 2021-03-18 | End: 2021-03-18 | Stop reason: HOSPADM

## 2021-03-18 RX ORDER — DEXAMETHASONE SODIUM PHOSPHATE 4 MG/ML
8 INJECTION, SOLUTION INTRA-ARTICULAR; INTRALESIONAL; INTRAMUSCULAR; INTRAVENOUS; SOFT TISSUE
Status: DISCONTINUED | OUTPATIENT
Start: 2021-03-18 | End: 2021-03-18 | Stop reason: HOSPADM

## 2021-03-18 RX ORDER — FENTANYL CITRATE 50 UG/ML
25 INJECTION, SOLUTION INTRAMUSCULAR; INTRAVENOUS
Status: DISCONTINUED | OUTPATIENT
Start: 2021-03-18 | End: 2021-03-18 | Stop reason: HOSPADM

## 2021-03-18 RX ORDER — SUFENTANIL CITRATE 50 UG/ML
INJECTION EPIDURAL; INTRAVENOUS AS NEEDED
Status: DISCONTINUED | OUTPATIENT
Start: 2021-03-18 | End: 2021-03-18 | Stop reason: SURG

## 2021-03-18 RX ORDER — FLUMAZENIL 0.1 MG/ML
0.2 INJECTION INTRAVENOUS AS NEEDED
Status: DISCONTINUED | OUTPATIENT
Start: 2021-03-18 | End: 2021-03-18 | Stop reason: HOSPADM

## 2021-03-18 RX ORDER — ACETAMINOPHEN 500 MG
1000 TABLET ORAL ONCE
Status: COMPLETED | OUTPATIENT
Start: 2021-03-18 | End: 2021-03-18

## 2021-03-18 RX ORDER — MAGNESIUM HYDROXIDE 1200 MG/15ML
LIQUID ORAL AS NEEDED
Status: DISCONTINUED | OUTPATIENT
Start: 2021-03-18 | End: 2021-03-18 | Stop reason: HOSPADM

## 2021-03-18 RX ORDER — SODIUM CHLORIDE 0.9 % (FLUSH) 0.9 %
3 SYRINGE (ML) INJECTION EVERY 12 HOURS SCHEDULED
Status: DISCONTINUED | OUTPATIENT
Start: 2021-03-18 | End: 2021-03-18 | Stop reason: HOSPADM

## 2021-03-18 RX ORDER — SODIUM CHLORIDE, SODIUM LACTATE, POTASSIUM CHLORIDE, CALCIUM CHLORIDE 600; 310; 30; 20 MG/100ML; MG/100ML; MG/100ML; MG/100ML
100 INJECTION, SOLUTION INTRAVENOUS CONTINUOUS
Status: DISCONTINUED | OUTPATIENT
Start: 2021-03-18 | End: 2021-03-18 | Stop reason: HOSPADM

## 2021-03-18 RX ORDER — ONDANSETRON 2 MG/ML
4 INJECTION INTRAMUSCULAR; INTRAVENOUS ONCE AS NEEDED
Status: DISCONTINUED | OUTPATIENT
Start: 2021-03-18 | End: 2021-03-18 | Stop reason: HOSPADM

## 2021-03-18 RX ORDER — OXYMETAZOLINE HYDROCHLORIDE 0.05 G/100ML
2 SPRAY NASAL ONCE
Status: DISCONTINUED | OUTPATIENT
Start: 2021-03-18 | End: 2021-03-18 | Stop reason: HOSPADM

## 2021-03-18 RX ORDER — SODIUM CHLORIDE, SODIUM LACTATE, POTASSIUM CHLORIDE, CALCIUM CHLORIDE 600; 310; 30; 20 MG/100ML; MG/100ML; MG/100ML; MG/100ML
1000 INJECTION, SOLUTION INTRAVENOUS CONTINUOUS
Status: DISCONTINUED | OUTPATIENT
Start: 2021-03-18 | End: 2021-03-18 | Stop reason: HOSPADM

## 2021-03-18 RX ORDER — OXYMETAZOLINE HYDROCHLORIDE 0.05 G/100ML
2 SPRAY NASAL
Status: DISCONTINUED | OUTPATIENT
Start: 2021-03-18 | End: 2021-03-18 | Stop reason: SDUPTHER

## 2021-03-18 RX ORDER — COCAINE HYDROCHLORIDE 40 MG/ML
SOLUTION NASAL AS NEEDED
Status: DISCONTINUED | OUTPATIENT
Start: 2021-03-18 | End: 2021-03-18 | Stop reason: HOSPADM

## 2021-03-18 RX ORDER — OXYCODONE AND ACETAMINOPHEN 10; 325 MG/1; MG/1
1 TABLET ORAL ONCE AS NEEDED
Status: COMPLETED | OUTPATIENT
Start: 2021-03-18 | End: 2021-03-18

## 2021-03-18 RX ORDER — OXYCODONE AND ACETAMINOPHEN 7.5; 325 MG/1; MG/1
2 TABLET ORAL EVERY 4 HOURS PRN
Status: DISCONTINUED | OUTPATIENT
Start: 2021-03-18 | End: 2021-03-18 | Stop reason: HOSPADM

## 2021-03-18 RX ORDER — SODIUM CHLORIDE 0.9 % (FLUSH) 0.9 %
3 SYRINGE (ML) INJECTION AS NEEDED
Status: DISCONTINUED | OUTPATIENT
Start: 2021-03-18 | End: 2021-03-18 | Stop reason: HOSPADM

## 2021-03-18 RX ORDER — MIDAZOLAM HYDROCHLORIDE 1 MG/ML
1 INJECTION INTRAMUSCULAR; INTRAVENOUS
Status: DISCONTINUED | OUTPATIENT
Start: 2021-03-18 | End: 2021-03-18 | Stop reason: HOSPADM

## 2021-03-18 RX ORDER — LIDOCAINE HYDROCHLORIDE 20 MG/ML
INJECTION, SOLUTION EPIDURAL; INFILTRATION; INTRACAUDAL; PERINEURAL AS NEEDED
Status: DISCONTINUED | OUTPATIENT
Start: 2021-03-18 | End: 2021-03-18 | Stop reason: SURG

## 2021-03-18 RX ORDER — OXYCODONE HYDROCHLORIDE AND ACETAMINOPHEN 5; 325 MG/1; MG/1
1 TABLET ORAL EVERY 4 HOURS PRN
Qty: 15 TABLET | Refills: 0 | Status: SHIPPED | OUTPATIENT
Start: 2021-03-18 | End: 2021-07-13

## 2021-03-18 RX ORDER — SODIUM CHLORIDE 0.9 % (FLUSH) 0.9 %
3-10 SYRINGE (ML) INJECTION AS NEEDED
Status: DISCONTINUED | OUTPATIENT
Start: 2021-03-18 | End: 2021-03-18 | Stop reason: HOSPADM

## 2021-03-18 RX ORDER — DEXAMETHASONE SODIUM PHOSPHATE 4 MG/ML
4 INJECTION, SOLUTION INTRA-ARTICULAR; INTRALESIONAL; INTRAMUSCULAR; INTRAVENOUS; SOFT TISSUE ONCE AS NEEDED
Status: DISCONTINUED | OUTPATIENT
Start: 2021-03-18 | End: 2021-03-18 | Stop reason: HOSPADM

## 2021-03-18 RX ORDER — PROPOFOL 10 MG/ML
VIAL (ML) INTRAVENOUS AS NEEDED
Status: DISCONTINUED | OUTPATIENT
Start: 2021-03-18 | End: 2021-03-18 | Stop reason: SURG

## 2021-03-18 RX ORDER — VECURONIUM BROMIDE 1 MG/ML
INJECTION, POWDER, LYOPHILIZED, FOR SOLUTION INTRAVENOUS AS NEEDED
Status: DISCONTINUED | OUTPATIENT
Start: 2021-03-18 | End: 2021-03-18 | Stop reason: SURG

## 2021-03-18 RX ORDER — MIDAZOLAM HYDROCHLORIDE 1 MG/ML
2 INJECTION INTRAMUSCULAR; INTRAVENOUS
Status: DISCONTINUED | OUTPATIENT
Start: 2021-03-18 | End: 2021-03-18 | Stop reason: HOSPADM

## 2021-03-18 RX ORDER — OXYMETAZOLINE HYDROCHLORIDE 0.05 G/100ML
2 SPRAY NASAL
Status: COMPLETED | OUTPATIENT
Start: 2021-03-18 | End: 2021-03-18

## 2021-03-18 RX ORDER — LABETALOL HYDROCHLORIDE 5 MG/ML
5 INJECTION, SOLUTION INTRAVENOUS
Status: DISCONTINUED | OUTPATIENT
Start: 2021-03-18 | End: 2021-03-18 | Stop reason: HOSPADM

## 2021-03-18 RX ORDER — OXYMETAZOLINE HYDROCHLORIDE 0.05 G/100ML
2 SPRAY NASAL 2 TIMES DAILY
Status: DISCONTINUED | OUTPATIENT
Start: 2021-03-18 | End: 2021-03-18 | Stop reason: SDUPTHER

## 2021-03-18 RX ADMIN — SODIUM CHLORIDE, POTASSIUM CHLORIDE, SODIUM LACTATE AND CALCIUM CHLORIDE 1000 ML: 600; 310; 30; 20 INJECTION, SOLUTION INTRAVENOUS at 05:50

## 2021-03-18 RX ADMIN — OXYCODONE HYDROCHLORIDE AND ACETAMINOPHEN 1 TABLET: 10; 325 TABLET ORAL at 08:48

## 2021-03-18 RX ADMIN — LIDOCAINE HYDROCHLORIDE 100 MG: 20 INJECTION, SOLUTION EPIDURAL; INFILTRATION; INTRACAUDAL; PERINEURAL at 07:05

## 2021-03-18 RX ADMIN — MIDAZOLAM 2 MG: 1 INJECTION INTRAMUSCULAR; INTRAVENOUS at 06:56

## 2021-03-18 RX ADMIN — SODIUM CHLORIDE, POTASSIUM CHLORIDE, SODIUM LACTATE AND CALCIUM CHLORIDE: 600; 310; 30; 20 INJECTION, SOLUTION INTRAVENOUS at 07:05

## 2021-03-18 RX ADMIN — Medication 2 SPRAY: at 06:55

## 2021-03-18 RX ADMIN — PROPOFOL 200 MG: 10 INJECTION, EMULSION INTRAVENOUS at 07:05

## 2021-03-18 RX ADMIN — ACETAMINOPHEN 1000 MG: 500 TABLET, FILM COATED ORAL at 06:57

## 2021-03-18 RX ADMIN — SUFENTANIL CITRATE 50 MCG: 50 INJECTION EPIDURAL; INTRAVENOUS at 07:05

## 2021-03-18 RX ADMIN — DEXAMETHASONE SODIUM PHOSPHATE 8 MG: 4 INJECTION, SOLUTION INTRAMUSCULAR; INTRAVENOUS at 06:55

## 2021-03-18 RX ADMIN — VECURONIUM BROMIDE 5 MG: 1 INJECTION, POWDER, LYOPHILIZED, FOR SOLUTION INTRAVENOUS at 07:05

## 2021-03-18 NOTE — OP NOTE
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]    Procedure/CPT® Codes:        Procedure(s):  septoplasty with turbinate reduction    Surgeon(s):  Mart Betts Jr., MD    Anesthesia:   General    Staff:   Circulator: Maria L Chang RN  Scrub Person: Kym Bella    Estimated Blood Loss:   Minimal    Specimens:                None      Findings:  Septum-right to left deviation very anterior at the maxillary crest-septum junction obstructing the left anterior nasal passage, left to right low moderate deviation and mild high deviation  Turbinates-inferior turbinates-enlarged, partially obstructing, right middle turbinate with large anjel bullosa contacting the septum  Nasal Valve-mild internal drooping  Nasal passage-bilateral narrowed by turbinates and septum    Complications:   none    Assistants:  none    Implants:  none    Time Out::  A time out was performed to confirm the patient, procedure and laterality.    Reason for the Operation: Laura Morales is a 30 y.o. female who has had a history of deviated nasal septum with nasal obstruction and breathing difficulty.  Preoperative discussion was carried out. Risks, benefits, options for therapy and complications were explained in clear and simple language.    Procedure Description:  The patient was taken back to the operating room, positioned on the operating table and placed under satisfactory anesthesia by the anesthesia staff.      Procedure detail:  Nasal packing:  The nose was injected with lidocaine 1% and epinephrine 1:100,000: 8 ml  The nose was packed with cocaine pledgets and allowed to stand.  The nasal packs were removed prior to the procedure.    Turbinoplasty: Inferior  Bilateral  Turbinoplasty was carried out endoscopically.  The scope was used to evaluate the turbinates.  The sinus scissors were used to remove soft tissue of the turbinates.  Bone was removed.  A medial flap of mucosa was developed and then folded under the resected bone edge.    Turbinoplasty: Middle Right   Anjel bullosa repair-the turbinate was injected with lidocaine 1% epinephrine 100 100,000  An inferior incision was made through the mucosa and into the anjel bullosa cell.  The medial lateral lamellae were then compressed to narrow the turbinate.  This opened up the middle meatus and reduce contact to the septum.    Closure:  After completion of the remaining nasal surgery,  The turbinate closure was addressed.    Septoplasty:  A right [place]'s incision was created and sharp elevation was carried down to the perichondrium on  Right side of the septum. An incision was created in the perichondrium and a subperichondrial elevation was started with a Day elevator on  Right side of the septum. Subperichondrial flaps were elevated back to the bony septum with a Day elevator. Once this was done, a 15 blade was used to create an incision in the caudal cartilage, leaving a 1-1.5 cm caudal strut. A swivel knife was then used to cut a wedge of cartilage, leaving at least a 1 cm dorsal strut. The cartilage was removed with a Claudia. Then, using a the elevator, the remaining cartilage at the maxillary crest was lifted up and removed. The maxillary crest was deviated to both sides. This was removed with an osteatome and a Tachahashi forceps. The bony septum was noted to be not deviated.  The inferior septum was still longer than the maxillary crest anteriorly.  The inferior portion of the septum and the caudal projection was shortened to allow it to seat midline.    After this was done, the septum was inspected and appeared to be midline.  The preserved cartilage was then morcelized and replaced in the midline  cartilaginous defect to reconstruct the septum.  Nasal Osteotomy: Maxillary Crest    Closure  The Micah’s incision was closed with 4-0 gut.  A 4-0 gut transfixion suture was used to center the caudal septum.  The septal flaps were closed with a 4-0 gut whipping stitch.  Gelfoam was placed medial lateral to the right anjel bullosa repair.  Nasal packing: Nasopore was placed Low in the nose along the inferior turbinates to hold the flap in position , Gelfoam was placed Higher in the nose to contain the nasal septal angle and compressed the superior septal flaps.  A mustache dressing was placed on the patient.    The operative site was inspected for retained foreign bodies and instruments.   Sponge/needle count was Correct.  Hemostasis was satisfactory.  The patient was then turned over to the anesthesia team and recovered from anesthesia.     Disposition: The patient was transported to the PACU in Good condition.   Patient will be discharged home following procedure.    Postoperative discussion held with: Mother  Procedure and findings reviewed.  DVT ASSESSMENT CARRIED OUT: Patient is in the immediate post-operative period and is not a candidate for anticoagulation therapy  Patient is at increased risk for bleeding if anticoagulant therapy is used    Mart Betts Jr, MD  3/18/2021  08:51 CDT

## 2021-03-18 NOTE — ANESTHESIA PROCEDURE NOTES
Airway  Urgency: elective    Date/Time: 3/18/2021 7:05 AM  Airway not difficult    General Information and Staff    Patient location during procedure: OR  CRNA: Tk Corbin CRNA    Indications and Patient Condition  Indications for airway management: airway protection    Preoxygenated: yes  MILS maintained throughout  Mask difficulty assessment: 1 - vent by mask    Final Airway Details  Final airway type: endotracheal airway      Successful airway: ETT  Cuffed: yes   Successful intubation technique: direct laryngoscopy  Endotracheal tube insertion site: oral  Blade: Cooper  Blade size: 2  ETT size (mm): 7.0  Cormack-Lehane Classification: grade I - full view of glottis  Placement verified by: chest auscultation and capnometry   Cuff volume (mL): 5  Measured from: lips  ETT/EBT  to lips (cm): 20  Number of attempts at approach: 1  Assessment: lips, teeth, and gum same as pre-op and atraumatic intubation

## 2021-03-18 NOTE — DISCHARGE INSTRUCTIONS
WRITTEN INSTRUCTIONS TO PATIENT/FAMILY:  Patient instruction sheet  Rhinoplasty/Septoplasty/Turbinate surgery     YOUR NEXT PAIN MEDICATION IS DUE AT______________         General Anesthesia, Adult, Care After  This sheet gives you information about how to care for yourself after your procedure. Your health care provider may also give you more specific instructions. If you have problems or questions, contact your health care provider.  What can I expect after the procedure?  After the procedure, the following side effects are common:  · Pain or discomfort at the IV site.  · Nausea.  · Vomiting.  · Sore throat.  · Trouble concentrating.  · Feeling cold or chills.  · Weak or tired.  · Sleepiness and fatigue.  · Soreness and body aches. These side effects can affect parts of the body that were not involved in surgery.  Follow these instructions at home:   For at least 24 hours after the procedure:  1. Have a responsible adult stay with you. It is important to have someone help care for you until you are awake and alert.  2. Rest as needed.  3. Do not:  ? Participate in activities in which you could fall or become injured.  ? Drive.  ? Use heavy machinery.  ? Drink alcohol.  ? Take sleeping pills or medicines that cause drowsiness.  ? Make important decisions or sign legal documents.  ? Take care of children on your own.  Eating and drinking  · Follow any instructions from your health care provider about eating or drinking restrictions.  · When you feel hungry, start by eating small amounts of foods that are soft and easy to digest (bland), such as toast. Gradually return to your regular diet.  · Drink enough fluid to keep your urine pale yellow.  · If you vomit, rehydrate by drinking water, juice, or clear broth.  General instructions  1. If you have sleep apnea, surgery and certain medicines can increase your risk for breathing problems. Follow instructions from your health care provider about wearing your sleep  device:  ? Anytime you are sleeping, including during daytime naps.  ? While taking prescription pain medicines, sleeping medicines, or medicines that make you drowsy.  2. Return to your normal activities as told by your health care provider. Ask your health care provider what activities are safe for you.  3. Take over-the-counter and prescription medicines only as told by your health care provider.  4. If you smoke, do not smoke without supervision.  5. Keep all follow-up visits as told by your health care provider. This is important.  Contact a health care provider if:  · You have nausea or vomiting that does not get better with medicine.  · You cannot eat or drink without vomiting.  · You have pain that does not get better with medicine.  · You are unable to pass urine.  · You develop a skin rash.  · You have a fever.  · You have redness around your IV site that gets worse.  Get help right away if:  · You have difficulty breathing.  · You have chest pain.  · You have blood in your urine or stool, or you vomit blood.  Summary  · After the procedure, it is common to have a sore throat or nausea. It is also common to feel tired.  · Have a responsible adult stay with you for the first 24 hours after general anesthesia. It is important to have someone help care for you until you are awake and alert.  · When you feel hungry, start by eating small amounts of foods that are soft and easy to digest (bland), such as toast. Gradually return to your regular diet.  · Drink enough fluid to keep your urine pale yellow.  · Return to your normal activities as told by your health care provider. Ask your health care provider what activities are safe for you.  This information is not intended to replace advice given to you by your health care provider. Make sure you discuss any questions you have with your health care provider.  Document Revised: 12/21/2018 Document Reviewed: 08/03/2018    CALL YOUR PHYSICIAN IF YOU EXPERIENCE   INCREASED PAIN NOT HELPED BY YOUR PAIN MEDICATION.      .                                              Fall Prevention in the Home      Falls can cause injuries. They can happen to people of all ages. There are many things you can do to make your home safe and to help prevent falls.    WHAT CAN I DO ON THE OUTSIDE OF MY HOME?  · Regularly fix the edges of walkways and driveways and fix any cracks.  · Remove anything that might make you trip as you walk through a door, such as a raised step or threshold.  · Trim any bushes or trees on the path to your home.  · Use bright outdoor lighting.  · Clear any walking paths of anything that might make someone trip, such as rocks or tools.  · Regularly check to see if handrails are loose or broken. Make sure that both sides of any steps have handrails.  · Any raised decks and porches should have guardrails on the edges.  · Have any leaves, snow, or ice cleared regularly.  · Use sand or salt on walking paths during winter.  · Clean up any spills in your garage right away. This includes oil or grease spills.  WHAT CAN I DO IN THE BATHROOM?    · Use night lights.  · Install grab bars by the toilet and in the tub and shower. Do not use towel bars as grab bars.  · Use non-skid mats or decals in the tub or shower.  · If you need to sit down in the shower, use a plastic, non-slip stool.  · Keep the floor dry. Clean up any water that spills on the floor as soon as it happens.  · Remove soap buildup in the tub or shower regularly.  · Attach bath mats securely with double-sided non-slip rug tape.  · Do not have throw rugs and other things on the floor that can make you trip.  WHAT CAN I DO IN THE BEDROOM?  · Use night lights.  · Make sure that you have a light by your bed that is easy to reach.  · Do not use any sheets or blankets that are too big for your bed. They should not hang down onto the floor.  · Have a firm chair that has side arms. You can use this for support while you get  dressed.  · Do not have throw rugs and other things on the floor that can make you trip.  WHAT CAN I DO IN THE KITCHEN?  · Clean up any spills right away.  · Avoid walking on wet floors.  · Keep items that you use a lot in easy-to-reach places.  · If you need to reach something above you, use a strong step stool that has a grab bar.  · Keep electrical cords out of the way.  · Do not use floor polish or wax that makes floors slippery. If you must use wax, use non-skid floor wax.  · Do not have throw rugs and other things on the floor that can make you trip.  WHAT CAN I DO WITH MY STAIRS?  · Do not leave any items on the stairs.  · Make sure that there are handrails on both sides of the stairs and use them. Fix handrails that are broken or loose. Make sure that handrails are as long as the stairways.  · Check any carpeting to make sure that it is firmly attached to the stairs. Fix any carpet that is loose or worn.  · Avoid having throw rugs at the top or bottom of the stairs. If you do have throw rugs, attach them to the floor with carpet tape.  · Make sure that you have a light switch at the top of the stairs and the bottom of the stairs. If you do not have them, ask someone to add them for you.  WHAT ELSE CAN I DO TO HELP PREVENT FALLS?  · Wear shoes that:  ¨ Do not have high heels.  ¨ Have rubber bottoms.  ¨ Are comfortable and fit you well.  ¨ Are closed at the toe. Do not wear sandals.  · If you use a stepladder:  ¨ Make sure that it is fully opened. Do not climb a closed stepladder.  ¨ Make sure that both sides of the stepladder are locked into place.  ¨ Ask someone to hold it for you, if possible.  · Clearly leeanne and make sure that you can see:  ¨ Any grab bars or handrails.  ¨ First and last steps.  ¨ Where the edge of each step is.  · Use tools that help you move around (mobility aids) if they are needed. These include:  ¨ Canes.  ¨ Walkers.  ¨ Scooters.  ¨ Crutches.  · Turn on the lights when you go into a  dark area. Replace any light bulbs as soon as they burn out.  · Set up your furniture so you have a clear path. Avoid moving your furniture around.  · If any of your floors are uneven, fix them.  · If there are any pets around you, be aware of where they are.  · Review your medicines with your doctor. Some medicines can make you feel dizzy. This can increase your chance of falling.  Ask your doctor what other things that you can do to help prevent falls.     This information is not intended to replace advice given to you by your health care provider. Make sure you discuss any questions you have with your health care provider.     Document Released: 10/14/2010 Document Revised: 05/03/2016 Document Reviewed: 01/22/2016  [employer] Interactive Patient Education ©2016 [employer]     PATIENT/FAMILY/RESPONSIBLE PARTY VERBALIZES UNDERSTANDING OF ABOVE EDUCATION.  COPY OF PAIN SCALE GIVEN AND REVIEWED WITH VERBALIZED UNDERSTANDING.

## 2021-03-18 NOTE — ANESTHESIA POSTPROCEDURE EVALUATION
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        Post Anesthesia Care and Evaluation    Patient location during evaluation: PACU  Patient participation: complete - patient participated  Level of consciousness: awake and alert  Pain management: adequate  Airway patency: patent  Anesthetic complications: No anesthetic complications    Cardiovascular status: acceptable  Respiratory status: acceptable  Hydration status: acceptable    Comments: Blood pressure 105/75, pulse 92, temperature 98.6 °F (37 °C), temperature source Temporal, resp. rate 16, height 158.5 cm (62.4\"), weight 50 kg (110 lb 3.7 oz), last menstrual period 03/15/2021, SpO2 97 %, not currently breastfeeding.    Pt discharged from PACU based on triston score >8      "

## 2021-03-18 NOTE — H&P
Mart Betts Jr, MD   PRIMARY CARE PROVIDER: Carlin Amado  REFERRING PROVIDER: Mart Betts Jr*    CHIEF COMPLAINT:  Preoperative evaluation for surgery    Subjective   History of Present Illness:  Laura Morales is a  30 y.o.  female who is here for follow up. She is scheduled for septoplasty with turbinate reduction (Bilateral). There has been no significant change in the history since the preoperative office evaluation.     Review of Systems:  CONSTITUTIONAL: no fever or chills  PULMONARY: no cough or shortness of breath  GI: no nausea or vomiting    Past History:  Medical History: has a past medical history of ADD (attention deficit disorder), Fracture of rib of left side, Seasonal allergies, and UTI (urinary tract infection).   Surgical History: has a past surgical history that includes Cystectomy (Right, 01/22/2014); Breast surgery; and Breast surgery.   Family History: family history includes Cancer in her maternal grandmother; Diabetes in her maternal grandmother; Gout in her father.   Social History: reports that she quit smoking about 8 years ago. Her smoking use included cigarettes. She smoked 0.25 packs per day. She has never used smokeless tobacco. She reports current alcohol use of about 1.0 standard drinks of alcohol per week. She reports that she does not use drugs.    Current Facility-Administered Medications:   •  dexamethasone (DECADRON) injection 4 mg, 4 mg, Intravenous, Once PRN, Margarette Chavarria MD  •  dexamethasone (DECADRON) injection 8 mg, 8 mg, Intravenous, 30 Min Pre-Op, Mart Betts Jr., MD, 8 mg at 03/18/21 0655  •  lactated ringers infusion 1,000 mL, 1,000 mL, Intravenous, Continuous, Mart Betts Jr., MD, Last Rate: 25 mL/hr at 03/18/21 0550, 1,000 mL at 03/18/21 0550  •  lactated ringers infusion, 100 mL/hr, Intravenous, Continuous, Margarette Chavarria MD  •  lidocaine PF 1% (XYLOCAINE) injection 0.5 mL, 0.5 mL, Intradermal, Once PRN, Alpesh  Mart Hunt Jr., MD  •  lidocaine PF 1% (XYLOCAINE) injection 0.5 mL, 0.5 mL, Injection, Once PRN, Margarette Chavarria MD  •  midazolam (VERSED) injection 1 mg, 1 mg, Intravenous, Q10 Min PRN **OR** midazolam (VERSED) injection 2 mg, 2 mg, Intravenous, Q10 Min PRN, Margarette Chavarria MD, 2 mg at 03/18/21 0656  •  oxymetazoline (AFRIN) nasal spray 2 spray, 2 spray, Each Nare, Once **FOLLOWED BY** [COMPLETED] oxymetazoline (AFRIN) nasal spray 2 spray, 2 spray, Each Nare, On Call, Mart Betts Jr., MD, 2 spray at 03/18/21 0655  •  sodium chloride 0.9 % flush 3 mL, 3 mL, Intravenous, PRN, Mart Betts Jr., MD  •  sodium chloride 0.9 % flush 3 mL, 3 mL, Intravenous, Q12H, Margarette Chavarria MD  •  sodium chloride 0.9 % flush 3-10 mL, 3-10 mL, Intravenous, PRN, Margarette Chavarria MD     Allergies: Acetaminophen-codeine     Objective     Vital Signs:  Temp:  [98.6 °F (37 °C)] 98.6 °F (37 °C)  Heart Rate:  [91-92] 92  Resp:  [16] 16  BP: (105-125)/(75-89) 105/75    Physical Exam:  CONSTITUTIONAL: well nourished, well-developed, alert, oriented, in no acute distress   COMMUNICATION AND VOICE: able to communicate normally, normal voice quality  HEAD: normocephalic, no lesions, atraumatic, no tenderness, no masses   FACE: appearance normal, no lesions, no tenderness, no deformities, facial motion symmetric  EYES: ocular motility normal, eyelids normal, orbits normal, no proptosis, conjunctiva normal , pupils equal, round   EARS:  Hearing: hearing to conversational voice intact bilaterally   External Ears: normal bilaterally, no lesions  NOSE:  External Nose: external nasal structure normal, no tenderness on palpation, no nasal discharge, no lesions, no evidence of trauma, nostrils patent   ORAL:  Lips: upper and lower lips without lesion   NECK:  Inspection and Palpation: neck appearance normal, no masses or tenderness  CHEST/RESPIRATORY: normal respiratory effort   CARDIOVASCULAR: no  cyanosis or edema   NEUROLOGICAL/PSYCHIATRIC: oriented to time, place and person, mood normal, affect appropriate, CN II-XII intact grossly      Assessment   ASSESSMENT:    Acquired deviated nasal septum    Trish bullosa      Plan   PLAN:  septoplasty with turbinate reduction (Bilateral)  The risks and benefits have been re-discussed and questions answered    Mart Betts Jr, MD  03/18/21  06:58 CDT

## 2021-03-18 NOTE — ANESTHESIA PREPROCEDURE EVALUATION
Anesthesia Evaluation     Patient summary reviewed   no history of anesthetic complications:  NPO Solid Status: > 8 hours  NPO Liquid Status: > 8 hours           Airway   Mallampati: I  TM distance: >3 FB  Neck ROM: full  No difficulty expected  Dental - normal exam     Pulmonary - negative pulmonary ROS   Cardiovascular - negative cardio ROS        Neuro/Psych- negative ROS  GI/Hepatic/Renal/Endo - negative ROS     Musculoskeletal (-) negative ROS    Abdominal    Substance History      OB/GYN          Other - negative ROS                       Anesthesia Plan    ASA 1     general   (Recent breast surgery with sports bra in place. Patient states she is not allowed to be out of bra for long periods of time, recognized it must be removed prior to OR. Also recently had back cyst removed and has 3 staples in place)  intravenous induction     Anesthetic plan, all risks, benefits, and alternatives have been provided, discussed and informed consent has been obtained with: patient.

## 2021-03-30 ENCOUNTER — OFFICE VISIT (OUTPATIENT)
Dept: OTOLARYNGOLOGY | Facility: CLINIC | Age: 31
End: 2021-03-30

## 2021-03-30 VITALS — SYSTOLIC BLOOD PRESSURE: 125 MMHG | TEMPERATURE: 96.9 F | DIASTOLIC BLOOD PRESSURE: 90 MMHG | HEART RATE: 138 BPM

## 2021-03-30 DIAGNOSIS — M79.18 MYOFASCIAL PAIN ON RIGHT SIDE: Primary | ICD-10-CM

## 2021-03-30 DIAGNOSIS — J34.2 ACQUIRED DEVIATED NASAL SEPTUM: ICD-10-CM

## 2021-03-30 DIAGNOSIS — J34.89 CONCHA BULLOSA: ICD-10-CM

## 2021-03-30 DIAGNOSIS — Z98.890 S/P NASAL SEPTOPLASTY: ICD-10-CM

## 2021-03-30 PROCEDURE — 99024 POSTOP FOLLOW-UP VISIT: CPT | Performed by: OTOLARYNGOLOGY

## 2021-03-30 PROCEDURE — 31237 NSL/SINS NDSC SURG BX POLYPC: CPT | Performed by: OTOLARYNGOLOGY

## 2021-03-30 NOTE — PROGRESS NOTES
ENT PROCEDURE NOTE:  Anesthesia: topical 4% tetracaine and oxymetazoline mix    Endoscopy Type:  Nasal Endoscopic Examination    Indications: No diagnosis found.     Procedure Details:    The patient was placed in the sitting position. After topical anesthesia and decongestion,  a rigid nasal endoscope  0 degree was passed along the nasal floor to the nasopharynx.  The scope was then passed to the middle and superior aspects of the nasal cavity. Systematic examination of the nasal cavity, nasopharynx and structures was performed.     Findings:  NASAL ENDOSCOPIC FINDINGS:    Nasal endoscopy   NASALMUCOSA:    abnormal:        Bilateral- Red, thickened, healing    NASAL PASSAGES:     abnormal   Left- Much more open, Right- Tomorrow open   NASAL VALVE:     intact, good support and no nasal obstruction   SEPTUM:     mucosa abnormal   Bilateral- Red, thickened, healing     deviation present:      deviated Right Very low mild   INFERIOR TURBINATES:     abnormal  Bilateral- Healing appropriately     resected:  Bilateral- Healing with minimal crusting, removed    MIDDLE TURBINATES:      Left   normal size, non-obstructing, no lesions, middle meatus non-obstructed, no lesions/polyps    abnormal:        RIGHT: Healing appropriately   MIDDLE MEATUS:      Normal, patent, no mucopus, non-surgical  Left      findings       RIGHT: Healing   NASOPHARYNX:     normal mucosa, normal secretions, Eustachian tubes normal, normal palate mobility, no lesions, adenoids appear normal    Condition:  Stable.  Patient tolerated procedure well.    Complications:  None    Post-procedure instructions reviewed with Patient  Instructions documented in AVS for patient to review

## 2021-03-30 NOTE — PROGRESS NOTES
[facility] [place] OT[facility]NGOLOGY, HEAD AND NECK SURGERY CLINIC NOTE    Chief Complaint   Patient presents with   • Post-op          HPI   Follow up  Accompanied by:  No one  Laura Morales is a  30 y.o. female s/p Septoplasty, turbinate surgery  3/18/2021  She has had lots of buggers. Dry mouth and tongue bleeding  She lost sense of smell and taste. She has had smell return but taste is only partially returned.        History     Last Reviewed by Mart Betts Jr., MD on 3/30/2021 at  3:35 PM    Sections Reviewed    Medical, Family, Tobacco, Surgical      Problem list reviewed by Mart Betts Jr., MD on 3/30/2021 at  3:35 PM  Medicines reviewed by Mart Betts Jr., MD on 3/30/2021 at  3:35 PM  Allergies reviewed by Mart Betts Jr., MD on 3/30/2021 at  3:35 PM         Vital Signs:   Temp:  [96.9 °F (36.1 °C)] 96.9 °F (36.1 °C)  Heart Rate:  [138] 138  BP: (125)/(90) 125/90    Physical Exam  Vitals reviewed.   Constitutional:       Appearance: Normal appearance. She is well-developed.   HENT:      Head: Normocephalic and atraumatic.      Jaw: Tenderness (R TMJ) and pain on movement (R TMJ) present. No trismus or swelling.      Salivary Glands: Right salivary gland is not diffusely enlarged or tender. Left salivary gland is not diffusely enlarged or tender.        Comments: Piercing R ala and L upper lip     Right Ear: Hearing, tympanic membrane, ear canal and external ear normal. Tenderness (TMJ and MM groove) present. No middle ear effusion. Tympanic membrane is not erythematous or retracted.      Left Ear: Hearing, tympanic membrane, ear canal and external ear normal. No tenderness.  No middle ear effusion. Tympanic membrane is not erythematous or retracted.      Nose:      Comments: See procedure note  Eyes:      Extraocular Movements: Extraocular movements intact.      Conjunctiva/sclera: Conjunctivae normal.   Pulmonary:      Effort: Pulmonary effort is normal. No respiratory distress.       Breath sounds: No stridor.   Musculoskeletal:         General: Normal range of motion.      Cervical back: Normal range of motion.   Skin:     Findings: No rash.   Neurological:      General: No focal deficit present.      Mental Status: She is alert.      Cranial Nerves: No cranial nerve deficit.   Psychiatric:         Behavior: Behavior normal. Behavior is cooperative.         Thought Content: Thought content normal.         Judgment: Judgment normal.             SnapShot   Notes   Encounters  Labs   Imaging   gifted2you   Media   Rslt Rev   :23}    Result Review    RESULTS REVIEW:    I have reviewed the patients old records in the chart.            Assessment:        Diagnosis Plan   1. Myofascial pain on right side      Still present   2. Acquired deviated nasal septum      Improved after septoplasty   3. Trish bullosa      Repaired and healing   4. S/P nasal septoplasty      Healing appropriately              Plan:        Resume preoperative activity and medications.  Patient appears to be healing normally.  She is finally had the crusting release and is able to breathe somewhat.  She will continue nasal saline and use Aleve for nose pain.  I expect her other complaints of smell and taste to improve over time.  Nasal saline  Aleve for nose pain  Flonase BID             MY CHART:  Patient is Patient is using My Chart    Patient understand(s) and agree(s) with the treatment plan as described.    Return in about 2 weeks (around 4/13/2021) for Recheck nose.            Mart Betts Jr, MD  03/30/21  15:41 CDT

## 2021-03-30 NOTE — PATIENT INSTRUCTIONS
NASAL SALINE:  Use 2 puffs each nostril 4-6 times daily and more frequently if possible.  You can buy saline spray or you can make your own and use an old spray bottle to administer  Use a humidifier at bedside  Recipe for saline:  Water                                 1 quart  Salt (table)                        1 tablespoon  Gylcerin (or Lesa Syrup)    1 teaspoon  Sodium bicarbonate           1 teaspoon  Sprays or [place] pots are recommended    Nasal steroid use:  Using nasal steroids:  You will be prescribed one of the following nasal steroids: Flonase, Nasacort, Nasonex, Rhinocort, Qnasl, Zetonna  2 puffs each nostril 2 times daily  Start as soon as possible  If you are using Afrin for 3 days with the nasal steroid,  Use Afrin first and wait 10 minutes to allow the nose to open. Then administer nasal steroids.    You can blow nose now    CONTACT INFORMATION:  The main office phone number is [phone]. For emergencies after hours and on weekends, this number will convert over to our answering service and the on call provider will answer. Please try to keep non emergent phone calls/ questions to office hours 9am-5pm Monday through Friday.     Viadeo  As an alternative, you can sign up and use the Epic MyChart system for more direct and quicker access for non emergent questions/ problems.  [facility] Viadeo allows you to send messages to your doctor, view your test results, renew your prescriptions, schedule appointments, and more. To sign up, go to [employer] and click on the Sign Up Now link in the New User? box. Enter your Viadeo Activation Code exactly as it appears below along with the last four digits of your Social Security Number and your Date of Birth () to complete the sign-up process. If you do not sign up before the expiration date, you must request a new code.    Viadeo Activation Code: Activation code not generated  Current Viadeo Status: Active    If you have questions,  you can email [email or web address] or call [phone] to talk to our [employer] staff. Remember, [employer] is NOT to be used for urgent needs. For medical emergencies, dial 911.

## 2021-04-01 DIAGNOSIS — F90.2 ATTENTION DEFICIT HYPERACTIVITY DISORDER (ADHD), COMBINED TYPE: ICD-10-CM

## 2021-04-01 RX ORDER — DEXTROAMPHETAMINE SACCHARATE, AMPHETAMINE ASPARTATE MONOHYDRATE, DEXTROAMPHETAMINE SULFATE AND AMPHETAMINE SULFATE 6.25; 6.25; 6.25; 6.25 MG/1; MG/1; MG/1; MG/1
25 CAPSULE, EXTENDED RELEASE ORAL EVERY MORNING
Qty: 30 CAPSULE | Refills: 0 | OUTPATIENT
Start: 2021-04-01 | End: 2021-05-01

## 2021-04-03 ENCOUNTER — NURSE TRIAGE (OUTPATIENT)
Dept: CALL CENTER | Facility: HOSPITAL | Age: 31
End: 2021-04-03

## 2021-04-03 NOTE — TELEPHONE ENCOUNTER
"She had septoplasty Dr. Betts, did this 3/18, today she has bad cough, sore throat, chest congestion upper lobes lungs, temp 99.8 told her to call Surgeon or be seen in Urgent Care. Cough is bad    Reason for Disposition  • [1] Caller has URGENT question AND [2] triager unable to answer question    Additional Information  • Negative: [1] Major abdominal surgical incision AND [2] wound gaping open AND [3] visible internal organs  • Negative: Sounds like a life-threatening emergency to the triager  • Negative: [1] Bleeding from incision AND [2] won't stop after 10 minutes of direct pressure  • Negative: [1] Widespread rash AND [2] bright red, sunburn-like  • Negative: Severe pain in the incision  • Negative: [1] Incision gaping open AND [2] < 48 hours since wound re-opened  • Negative: [1] Incision gaping open AND [2] length of opening > 2 inches (5 cm)  • Negative: Patient sounds very sick or weak to the triager  • Negative: Sounds like a serious complication to the triager  • Negative: Fever > 100.4 F (38.0 C)  • Negative: [1] Incision looks infected (spreading redness, pain) AND [2] fever > 99.5 F (37.5 C)  • Negative: [1] Incision looks infected (spreading redness, pain) AND [2] large red area (> 2 in. or 5 cm)  • Negative: [1] Incision looks infected (spreading redness, pain) AND [2] face wound  • Negative: [1] Red streak runs from the incision AND [2] longer than 1 inch (2.5 cm)  • Negative: [1] Pus or bad-smelling fluid draining from incision AND [2] no fever  • Negative: [1] Post-op pain AND [2] not controlled with pain medications  • Negative: Dressing soaked with blood or body fluid (e.g., drainage)  • Negative: [1] Raised bruise and [2] size > 2 inches (5 cm) and expanding    Answer Assessment - Initial Assessment Questions  1. SYMPTOM: \"What's the main symptom you're concerned about?\" (e.g., redness, pain, drainage)      Cough, drainage and chest lung issues  2. ONSET: \"When did cough  start?\"      " "today  3. SURGERY: \"What surgery was performed?\"      septoplasty  4. DATE of SURGERY: \"When was surgery performed?\"       3/18  5. INCISION SITE: \"Where is the incision located?\"       In nose  6. REDNESS: \"Is there any redness at the incision site?\" If yes, ask: \"How wide across is the redness?\" (Inches, centimeters)       no  7. PAIN: \"Is there any pain?\" If so, ask: \"How bad is it?\"  (Scale 1-10; or mild, moderate, severe)      Rated 7  8. BLEEDING: \"Is there any bleeding?\" If so, ask: \"How much?\" and \"Where?\"      no  9. DRAINAGE: \"Is there any drainage from the incision site?\" If yes, ask: \"What color and how much?\" (e.g., red, cloudy, pus; drops, teaspoon)      Sinus drainage and chest fullness  10. FEVER: \"Do you have a fever?\" If so, ask: \"What is your temperature, how was it measured, and when did it start?\"        99  11. OTHER SYMPTOMS: \"Do you have any other symptoms?\" (e.g., shaking chills, weakness, rash elsewhere on body)        Cough, sore throat    Protocols used: POST-OP INCISION SYMPTOMS AND QUESTIONS-ADULT-      "

## 2021-04-05 ENCOUNTER — LAB (OUTPATIENT)
Dept: LAB | Facility: HOSPITAL | Age: 31
End: 2021-04-05

## 2021-04-05 ENCOUNTER — OFFICE VISIT (OUTPATIENT)
Dept: FAMILY MEDICINE CLINIC | Facility: CLINIC | Age: 31
End: 2021-04-05

## 2021-04-05 ENCOUNTER — TELEPHONE (OUTPATIENT)
Dept: FAMILY MEDICINE CLINIC | Facility: CLINIC | Age: 31
End: 2021-04-05

## 2021-04-05 VITALS
DIASTOLIC BLOOD PRESSURE: 74 MMHG | SYSTOLIC BLOOD PRESSURE: 104 MMHG | WEIGHT: 107 LBS | TEMPERATURE: 97.8 F | HEART RATE: 100 BPM | BODY MASS INDEX: 19.69 KG/M2 | HEIGHT: 62 IN | RESPIRATION RATE: 20 BRPM

## 2021-04-05 DIAGNOSIS — F90.2 ATTENTION DEFICIT HYPERACTIVITY DISORDER (ADHD), COMBINED TYPE: Primary | ICD-10-CM

## 2021-04-05 DIAGNOSIS — J01.00 ACUTE MAXILLARY SINUSITIS, RECURRENCE NOT SPECIFIED: ICD-10-CM

## 2021-04-05 DIAGNOSIS — R09.81 SINUS CONGESTION: ICD-10-CM

## 2021-04-05 DIAGNOSIS — F90.2 ATTENTION DEFICIT HYPERACTIVITY DISORDER (ADHD), COMBINED TYPE: ICD-10-CM

## 2021-04-05 PROBLEM — T85.42XA: Status: ACTIVE | Noted: 2020-11-30

## 2021-04-05 LAB
AMPHET+METHAMPHET UR QL: POSITIVE
AMPHETAMINES UR QL: NEGATIVE
BARBITURATES UR QL SCN: NEGATIVE
BENZODIAZ UR QL SCN: NEGATIVE
BUPRENORPHINE SERPL-MCNC: NEGATIVE NG/ML
CANNABINOIDS SERPL QL: NEGATIVE
COCAINE UR QL: NEGATIVE
METHADONE UR QL SCN: NEGATIVE
OPIATES UR QL: NEGATIVE
OXYCODONE UR QL SCN: NEGATIVE
PCP UR QL SCN: NEGATIVE
PROPOXYPH UR QL: NEGATIVE
TRICYCLICS UR QL SCN: NEGATIVE

## 2021-04-05 PROCEDURE — 99213 OFFICE O/P EST LOW 20 MIN: CPT | Performed by: NURSE PRACTITIONER

## 2021-04-05 PROCEDURE — 96372 THER/PROPH/DIAG INJ SC/IM: CPT | Performed by: NURSE PRACTITIONER

## 2021-04-05 PROCEDURE — 80306 DRUG TEST PRSMV INSTRMNT: CPT

## 2021-04-05 RX ORDER — DEXAMETHASONE SODIUM PHOSPHATE 4 MG/ML
8 INJECTION, SOLUTION INTRA-ARTICULAR; INTRALESIONAL; INTRAMUSCULAR; INTRAVENOUS; SOFT TISSUE ONCE
Status: COMPLETED | OUTPATIENT
Start: 2021-04-05 | End: 2021-04-05

## 2021-04-05 RX ORDER — BACLOFEN 10 MG/1
10 TABLET ORAL DAILY PRN
COMMUNITY

## 2021-04-05 RX ORDER — DEXTROAMPHETAMINE SACCHARATE, AMPHETAMINE ASPARTATE MONOHYDRATE, DEXTROAMPHETAMINE SULFATE AND AMPHETAMINE SULFATE 6.25; 6.25; 6.25; 6.25 MG/1; MG/1; MG/1; MG/1
25 CAPSULE, EXTENDED RELEASE ORAL EVERY MORNING
COMMUNITY
End: 2021-04-20 | Stop reason: SDUPTHER

## 2021-04-05 RX ADMIN — DEXAMETHASONE SODIUM PHOSPHATE 8 MG: 4 INJECTION, SOLUTION INTRA-ARTICULAR; INTRALESIONAL; INTRAMUSCULAR; INTRAVENOUS; SOFT TISSUE at 15:01

## 2021-04-05 NOTE — TELEPHONE ENCOUNTER
Patient understands she cannot fill her meds until after the 30 days is up when she was prescribed percocet. She did explain to me that she has tried zanaflex, flexeril, and baclofen, and doesn't see any improvement for her back pain. She asked if we could do 2mg valium, I explained to her that Dr Valentine will absolutely not prescribe this, especially with her being on ADHD medications. She wants to see if anything else we can prescribe for her back pain other than what she has tried and failed.

## 2021-04-05 NOTE — TELEPHONE ENCOUNTER
----- Message from NAEEM Funes sent at 4/5/2021  2:46 PM CDT -----  Uds is appropriate. However, she was prescribed percocet on 3/18 so we cant fill adhd meds until then. She was also asking for valium, Dr. Valentine said absolutely not. We could prescribe her zanaflex or flexeril short term for her back pain.

## 2021-04-05 NOTE — PROGRESS NOTES
"Chief Complaint  ADHD    Subjective    History of Present Illness      Patient presents to [facility] for   Pt states that she is here for a f/u with ADHD and says she is doing well with medication and the dose. Pt says she has an adequate appetite and sleeps well at night.       ADHD/Mood HPI    Visit for:  follow-up. Most recent visit was 6 months ago.  Interim changes to follow up on today: no change in medication  Work/School Performance:  going well  Cognitive:  able to focus    Behavior  Hyperactivity: is not hyperactive  Impulsivity: no impulsivity  Tasking: able to mult-task    Social  ADHD social/impulsive symptoms:  not impatient    Behavioral health  Behavior: no concerns  Emotional coping: demonstrates feelings of no concerns         Review of Systems   Constitutional: Negative.    HENT: Negative.    Eyes: Negative.    Respiratory: Negative.    Cardiovascular: Negative.    Gastrointestinal: Negative.    Endocrine: Negative.    Genitourinary: Negative.    Musculoskeletal: Negative.    Skin: Negative.    Allergic/Immunologic: Negative.    Neurological: Negative.    Hematological: Negative.    Psychiatric/Behavioral: Negative.        I have reviewed and agree with the HPI and ROS information as above.  Mariaa Medina, NAEEM     Objective   Vital Signs:   /74   Pulse 100   Temp 97.8 °F (36.6 °C)   Resp 20   Ht 157.5 cm (62\")   Wt 48.5 kg (107 lb)   BMI 19.57 kg/m²       Physical Exam  Constitutional:       Appearance: Normal appearance. She is well-developed.   HENT:      Head: Normocephalic and atraumatic.      Right Ear: External ear normal.      Left Ear: External ear normal.      Nose: Nose normal. No nasal tenderness or congestion.      Mouth/Throat:      Lips: Pink. No lesions.      Mouth: Mucous membranes are moist. No oral lesions.      Dentition: Normal dentition.      Pharynx: Oropharynx is clear. No pharyngeal swelling, oropharyngeal exudate or " posterior oropharyngeal erythema.   Eyes:      General: Lids are normal. Vision grossly intact. No scleral icterus.        Right eye: No discharge.         Left eye: No discharge.      Extraocular Movements: Extraocular movements intact.      Conjunctiva/sclera: Conjunctivae normal.      Right eye: Right conjunctiva is not injected.      Left eye: Left conjunctiva is not injected.      Pupils: Pupils are equal, round, and reactive to light.   Cardiovascular:      Rate and Rhythm: Normal rate and regular rhythm.      Heart sounds: Normal heart sounds. No murmur heard.   No gallop.    Pulmonary:      Effort: Pulmonary effort is normal.      Breath sounds: Normal breath sounds and air entry. No wheezing, rhonchi or rales.   Musculoskeletal:         General: No tenderness or deformity. Normal range of motion.      Cervical back: Full passive range of motion without pain, normal range of motion and neck supple.      Right lower leg: No edema.      Left lower leg: No edema.   Skin:     General: Skin is warm and dry.      Coloration: Skin is not jaundiced.      Findings: No rash.   Neurological:      Mental Status: She is alert and oriented to person, place, and time.      Cranial Nerves: Cranial nerves are intact.      Sensory: Sensation is intact.      Motor: Motor function is intact.      Coordination: Coordination is intact.      Gait: Gait is intact.   Psychiatric:         Attention and Perception: Attention normal.         Mood and Affect: Mood and affect normal.         Behavior: Behavior is not hyperactive. Behavior is cooperative.         Thought Content: Thought content normal.         Judgment: Judgment normal.          Result Review  Data Reviewed:              Assessment and Plan    Patient's Body mass index is 19.57 kg/m².     Problem List Items Addressed This Visit        Mental Health    Attention deficit hyperactivity disorder (ADHD), combined type - Primary    Relevant Medications     amphetamine-dextroamphetamine XR (Adderall XR) 25 MG 24 hr capsule    Other Relevant Orders    Urine Drug Screen - Urine, Clean Catch      Other Visit Diagnoses     Sinus congestion        Acute maxillary sinusitis, recurrence not specified        Relevant Medications    dexamethasone (DECADRON) injection 8 mg (Start on 4/5/2021  3:00 PM)          Patient presents to the clinic today for 3-month ADHD follow-up.  She feels she is doing well on the Adderall XR 25.  However, she has recently had a septoplasty in February and has been taking Percocet. 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 I explained to pt that our office does not prescribe valium and she could not be on valium or percocet while taking adderall xr. I also offered her a muscle relaxer which she declined stating that they do not work for her. She is due for routine uds at this time.     Pt also has c/o sinus congestion and drainage. She recently had a septoplasty and was told by her ENT specialist not to use an antibiotic. She has been using flonase with minimal improvements. Normal PE. Will give steroid injection in office today per pt requesting. Recommnended to use flonase as well.     Follow Up   Return in about 3 months (around 7/5/2021) for Recheck.  Patient was given instructions and counseling regarding her condition or for health maintenance advice. Please see specific information pulled into the AVS if appropriate.

## 2021-04-20 DIAGNOSIS — F90.2 ATTENTION DEFICIT HYPERACTIVITY DISORDER (ADHD), COMBINED TYPE: Primary | ICD-10-CM

## 2021-04-20 RX ORDER — DEXTROAMPHETAMINE SACCHARATE, AMPHETAMINE ASPARTATE MONOHYDRATE, DEXTROAMPHETAMINE SULFATE AND AMPHETAMINE SULFATE 6.25; 6.25; 6.25; 6.25 MG/1; MG/1; MG/1; MG/1
25 CAPSULE, EXTENDED RELEASE ORAL EVERY MORNING
Qty: 30 CAPSULE | Refills: 0 | Status: SHIPPED | OUTPATIENT
Start: 2021-04-20 | End: 2021-06-08

## 2021-04-20 NOTE — TELEPHONE ENCOUNTER
Caller: Laura Morales    Relationship: Self    Best call back number: [phone]    Medication needed:   Requested Prescriptions     Pending Prescriptions Disp Refills   • amphetamine-dextroamphetamine XR (Adderall XR) 25 MG 24 hr capsule       Sig: Take 1 capsule by mouth Every Morning       When do you need the refill by: 4/20/21    What additional details did the patient provide when requesting the medication: WITHOUT FOR A MONTH  Does the patient have less than a 3 day supply:  [x] Yes  [] No    What is the patient's preferred pharmacy: [facility] [address] - [phone]  - [phone] FX         =

## 2021-05-04 ENCOUNTER — HOSPITAL ENCOUNTER (EMERGENCY)
Facility: HOSPITAL | Age: 31
Discharge: HOME | End: 2021-05-04
Admitting: EMERGENCY MEDICINE

## 2021-05-04 ENCOUNTER — APPOINTMENT (OUTPATIENT)
Dept: GENERAL RADIOLOGY | Facility: HOSPITAL | Age: 31
End: 2021-05-04

## 2021-05-04 VITALS
OXYGEN SATURATION: 99 % | RESPIRATION RATE: 18 BRPM | DIASTOLIC BLOOD PRESSURE: 74 MMHG | HEIGHT: 62 IN | SYSTOLIC BLOOD PRESSURE: 118 MMHG | WEIGHT: 108 LBS | TEMPERATURE: 98.2 F | HEART RATE: 84 BPM | BODY MASS INDEX: 19.88 KG/M2

## 2021-05-04 DIAGNOSIS — J04.0 LARYNGITIS: ICD-10-CM

## 2021-05-04 DIAGNOSIS — J02.9 PHARYNGITIS, UNSPECIFIED ETIOLOGY: ICD-10-CM

## 2021-05-04 DIAGNOSIS — N39.0 URINARY TRACT INFECTION WITHOUT HEMATURIA, SITE UNSPECIFIED: Primary | ICD-10-CM

## 2021-05-04 LAB
B-HCG UR QL: NEGATIVE
BACTERIA UR QL AUTO: ABNORMAL /HPF
BILIRUB UR QL STRIP: NEGATIVE
CLARITY UR: CLEAR
COLOR UR: YELLOW
GLUCOSE UR STRIP-MCNC: NEGATIVE MG/DL
HGB UR QL STRIP.AUTO: NEGATIVE
HYALINE CASTS UR QL AUTO: ABNORMAL /LPF
INTERNAL NEGATIVE CONTROL: NEGATIVE
INTERNAL POSITIVE CONTROL: POSITIVE
KETONES UR QL STRIP: NEGATIVE
LEUKOCYTE ESTERASE UR QL STRIP.AUTO: ABNORMAL
Lab: NORMAL
NITRITE UR QL STRIP: NEGATIVE
PH UR STRIP.AUTO: 5.5 [PH] (ref 5–8)
PROT UR QL STRIP: NEGATIVE
RBC # UR: ABNORMAL /HPF
REF LAB TEST METHOD: ABNORMAL
S PYO AG THROAT QL: NEGATIVE
SARS-COV-2 RNA PNL SPEC NAA+PROBE: NOT DETECTED
SP GR UR STRIP: 1.02 (ref 1–1.03)
SQUAMOUS #/AREA URNS HPF: ABNORMAL /HPF
UROBILINOGEN UR QL STRIP: ABNORMAL
WBC UR QL AUTO: ABNORMAL /HPF

## 2021-05-04 PROCEDURE — 87880 STREP A ASSAY W/OPTIC: CPT | Performed by: NURSE PRACTITIONER

## 2021-05-04 PROCEDURE — 87081 CULTURE SCREEN ONLY: CPT | Performed by: NURSE PRACTITIONER

## 2021-05-04 PROCEDURE — 87635 SARS-COV-2 COVID-19 AMP PRB: CPT | Performed by: NURSE PRACTITIONER

## 2021-05-04 PROCEDURE — 71045 X-RAY EXAM CHEST 1 VIEW: CPT

## 2021-05-04 PROCEDURE — 81001 URINALYSIS AUTO W/SCOPE: CPT | Performed by: NURSE PRACTITIONER

## 2021-05-04 PROCEDURE — 99283 EMERGENCY DEPT VISIT LOW MDM: CPT

## 2021-05-04 PROCEDURE — 81025 URINE PREGNANCY TEST: CPT | Performed by: NURSE PRACTITIONER

## 2021-05-04 RX ORDER — METHYLPREDNISOLONE 4 MG/1
TABLET ORAL
Qty: 21 EACH | Refills: 0 | Status: SHIPPED | OUTPATIENT
Start: 2021-05-04 | End: 2021-07-13

## 2021-05-04 RX ORDER — GABAPENTIN 300 MG/1
CAPSULE ORAL 3 TIMES DAILY PRN
COMMUNITY
Start: 2021-04-12 | End: 2023-03-28

## 2021-05-04 RX ORDER — CEFDINIR 300 MG/1
300 CAPSULE ORAL 2 TIMES DAILY
Qty: 20 CAPSULE | Refills: 0 | Status: SHIPPED | OUTPATIENT
Start: 2021-05-04 | End: 2021-05-14

## 2021-05-06 LAB — BACTERIA SPEC AEROBE CULT: NORMAL

## 2021-06-08 DIAGNOSIS — F90.2 ATTENTION DEFICIT HYPERACTIVITY DISORDER (ADHD), COMBINED TYPE: ICD-10-CM

## 2021-06-08 RX ORDER — DEXTROAMPHETAMINE SACCHARATE, AMPHETAMINE ASPARTATE MONOHYDRATE, DEXTROAMPHETAMINE SULFATE AND AMPHETAMINE SULFATE 6.25; 6.25; 6.25; 6.25 MG/1; MG/1; MG/1; MG/1
CAPSULE, EXTENDED RELEASE ORAL
Qty: 30 CAPSULE | Refills: 0 | Status: SHIPPED | OUTPATIENT
Start: 2021-06-08 | End: 2021-07-13

## 2021-07-08 DIAGNOSIS — F90.2 ATTENTION DEFICIT HYPERACTIVITY DISORDER (ADHD), COMBINED TYPE: ICD-10-CM

## 2021-07-08 RX ORDER — DEXTROAMPHETAMINE SULFATE, DEXTROAMPHETAMINE SACCHARATE, AMPHETAMINE SULFATE AND AMPHETAMINE ASPARTATE 6.25; 6.25; 6.25; 6.25 MG/1; MG/1; MG/1; MG/1
CAPSULE, EXTENDED RELEASE ORAL
Qty: 30 CAPSULE | Refills: 0 | OUTPATIENT
Start: 2021-07-08

## 2021-07-13 ENCOUNTER — TELEMEDICINE (OUTPATIENT)
Dept: FAMILY MEDICINE CLINIC | Facility: CLINIC | Age: 31
End: 2021-07-13

## 2021-07-13 VITALS — WEIGHT: 108 LBS | HEIGHT: 62 IN | BODY MASS INDEX: 19.88 KG/M2

## 2021-07-13 DIAGNOSIS — F90.2 ATTENTION DEFICIT HYPERACTIVITY DISORDER (ADHD), COMBINED TYPE: Primary | ICD-10-CM

## 2021-07-13 DIAGNOSIS — F90.9 ATTENTION DEFICIT HYPERACTIVITY DISORDER (ADHD), UNSPECIFIED ADHD TYPE: Primary | ICD-10-CM

## 2021-07-13 PROCEDURE — 99213 OFFICE O/P EST LOW 20 MIN: CPT | Performed by: NURSE PRACTITIONER

## 2021-07-13 RX ORDER — DEXTROAMPHETAMINE SACCHARATE, AMPHETAMINE ASPARTATE MONOHYDRATE, DEXTROAMPHETAMINE SULFATE AND AMPHETAMINE SULFATE 6.25; 6.25; 6.25; 6.25 MG/1; MG/1; MG/1; MG/1
25 CAPSULE, EXTENDED RELEASE ORAL EVERY MORNING
Qty: 30 CAPSULE | Refills: 0 | Status: SHIPPED | OUTPATIENT
Start: 2021-08-10 | End: 2021-09-20

## 2021-07-13 RX ORDER — DEXTROAMPHETAMINE SACCHARATE, AMPHETAMINE ASPARTATE MONOHYDRATE, DEXTROAMPHETAMINE SULFATE AND AMPHETAMINE SULFATE 6.25; 6.25; 6.25; 6.25 MG/1; MG/1; MG/1; MG/1
25 CAPSULE, EXTENDED RELEASE ORAL EVERY MORNING
Qty: 30 CAPSULE | Refills: 0 | Status: SHIPPED | OUTPATIENT
Start: 2021-07-13 | End: 2021-07-26 | Stop reason: SDUPTHER

## 2021-07-13 NOTE — PATIENT INSTRUCTIONS

## 2021-07-13 NOTE — PROGRESS NOTES
"This was an audio and video enabled telemedicine encounter. Patient verbally consented to visit.     Chief Complaint  ADHD (Patient presents for Adderall Xr follow up and reports doing well. )    Subjective    History of Present Illness      Patient presents to [facility] for   Patient reports on telehealth for ADHD follow-up.  She reports current dose of Adderall XR 25 mg is working well.  She denies other concerns or complaints.       Review of Systems   Psychiatric/Behavioral: Negative.    All other systems reviewed and are negative.      I have reviewed and agree with the HPI and ROS information as above.  NAEEM Rojas     Objective   Vital Signs:   Ht 157.5 cm (62\")   Wt 49 kg (108 lb)   BMI 19.75 kg/m²       Physical Exam  Constitutional:       Appearance: Normal appearance. She is well-developed.   HENT:      Head: Normocephalic and atraumatic.      Nose: No congestion.      Mouth/Throat:      Lips: Pink. No lesions.   Eyes:      General: Lids are normal. Vision grossly intact.      Conjunctiva/sclera: Conjunctivae normal.      Right eye: Right conjunctiva is not injected.      Left eye: Left conjunctiva is not injected.   Pulmonary:      Effort: Pulmonary effort is normal.   Musculoskeletal:         General: Normal range of motion.      Cervical back: Full passive range of motion without pain, normal range of motion and neck supple.   Skin:     General: Skin is dry.   Neurological:      Mental Status: She is alert and oriented to person, place, and time.      Motor: Motor function is intact.   Psychiatric:         Mood and Affect: Mood and affect normal.         Judgment: Judgment normal.          Result Review  Data Reviewed:          Urine Drug Screen - Urine, Clean Catch (04/05/2021 14:18)           Assessment and Plan      Problem List Items Addressed This Visit     None      Visit Diagnoses     Attention deficit hyperactivity disorder (ADHD), unspecified ADHD " type    -  Primary      Doing well, continue same. UDS is utd. F/u 3 months.     ADHD Follow up:    Reji report initiated in office and pending.  All questions, including medication and side effects, were discussed in detail at time of patient's visit. Patient will continue same medication which was discussed at today's visit. Patient is to return in 3 month(s).          Follow Up   Return in about 3 months (around 10/13/2021).  Patient was given instructions and counseling regarding her condition or for health maintenance advice. Please see specific information pulled into the AVS if appropriate.

## 2021-07-26 ENCOUNTER — OFFICE VISIT (OUTPATIENT)
Dept: FAMILY MEDICINE CLINIC | Facility: CLINIC | Age: 31
End: 2021-07-26

## 2021-07-26 VITALS
SYSTOLIC BLOOD PRESSURE: 109 MMHG | WEIGHT: 109 LBS | BODY MASS INDEX: 20.06 KG/M2 | HEART RATE: 89 BPM | HEIGHT: 62 IN | DIASTOLIC BLOOD PRESSURE: 74 MMHG

## 2021-07-26 DIAGNOSIS — R10.32 LEFT LOWER QUADRANT PAIN: ICD-10-CM

## 2021-07-26 DIAGNOSIS — K92.1 BLOOD IN STOOL: Primary | ICD-10-CM

## 2021-07-26 DIAGNOSIS — R11.0 NAUSEA: ICD-10-CM

## 2021-07-26 PROCEDURE — 99214 OFFICE O/P EST MOD 30 MIN: CPT | Performed by: NURSE PRACTITIONER

## 2021-07-26 RX ORDER — GABAPENTIN 300 MG/1
300 CAPSULE ORAL AS NEEDED
COMMUNITY
Start: 2021-04-12 | End: 2021-09-06

## 2021-07-26 RX ORDER — VALACYCLOVIR HYDROCHLORIDE 500 MG/1
500 TABLET, FILM COATED ORAL DAILY
COMMUNITY

## 2021-07-26 NOTE — PROGRESS NOTES
"Chief Complaint  Rectal Bleeding    Subjective    History of Present Illness      Patient presents to [facility] for   States she has had red blood in her stool since last Wednesday. She has had a little bit of cramping in her low back and low abd.     Rectal Bleeding  This is a new problem. The current episode started in the past 7 days. The problem occurs every several days. The problem has been unchanged. Associated symptoms include abdominal pain (cramps). Nothing aggravates the symptoms. She has tried nothing for the symptoms. The treatment provided no relief.        Review of Systems   Gastrointestinal: Positive for abdominal pain (cramps) and hematochezia.       I have reviewed and agree with the HPI and ROS information as above.  Leti Lemus, APRN     Objective   Vital Signs:   /74   Pulse 89   Ht 157.5 cm (62\")   Wt 49.4 kg (109 lb)   BMI 19.94 kg/m²       Physical Exam  Constitutional:       Appearance: Normal appearance. She is well-developed.   HENT:      Head: Normocephalic and atraumatic.      Right Ear: External ear normal.      Left Ear: External ear normal.      Nose: Nose normal. No nasal tenderness or congestion.      Mouth/Throat:      Lips: Pink. No lesions.      Mouth: Mucous membranes are moist. No oral lesions.      Dentition: Normal dentition.      Pharynx: Oropharynx is clear. No pharyngeal swelling, oropharyngeal exudate or posterior oropharyngeal erythema.   Eyes:      General: Lids are normal. Vision grossly intact. No scleral icterus.        Right eye: No discharge.         Left eye: No discharge.      Extraocular Movements: Extraocular movements intact.      Conjunctiva/sclera: Conjunctivae normal.      Right eye: Right conjunctiva is not injected.      Left eye: Left conjunctiva is not injected.      Pupils: Pupils are equal, round, and reactive to light.   Cardiovascular:      Rate and Rhythm: Normal rate and regular rhythm.     "  Heart sounds: Normal heart sounds. No murmur heard.   No gallop.    Pulmonary:      Effort: Pulmonary effort is normal.      Breath sounds: Normal breath sounds and air entry. No wheezing, rhonchi or rales.   Abdominal:      General: Bowel sounds are normal.      Palpations: Abdomen is soft.      Tenderness: There is abdominal tenderness.      Comments: Left lower quadrant and lower back pain   Musculoskeletal:         General: No tenderness or deformity. Normal range of motion.      Cervical back: Full passive range of motion without pain, normal range of motion and neck supple.      Right lower leg: No edema.      Left lower leg: No edema.   Skin:     General: Skin is warm and dry.      Coloration: Skin is not jaundiced.      Findings: No rash.   Neurological:      Mental Status: She is alert and oriented to person, place, and time.      Cranial Nerves: Cranial nerves are intact.      Sensory: Sensation is intact.      Motor: Motor function is intact.      Coordination: Coordination is intact.      Gait: Gait is intact.   Psychiatric:         Attention and Perception: Attention normal.         Mood and Affect: Mood and affect normal.         Behavior: Behavior is not hyperactive. Behavior is cooperative.         Thought Content: Thought content normal.         Judgment: Judgment normal.          Result Review  Data Reviewed:                   Assessment and Plan      Problem List Items Addressed This Visit     None      Visit Diagnoses     Blood in stool    -  Primary    Relevant Orders    Ambulatory Referral to Gastroenterology    Amylase    Lipase    CBC & Differential    Comprehensive Metabolic Panel    C-reactive Protein    CT Abdomen Pelvis With Contrast    Pregnancy, Urine - Urine, Clean Catch    Sedimentation Rate    Urinalysis With Culture If Indicated -    Left lower quadrant pain        Relevant Orders    Amylase    Lipase    CBC & Differential    Comprehensive Metabolic Panel    C-reactive Protein     CT Abdomen Pelvis With Contrast    Pregnancy, Urine - Urine, Clean Catch    Sedimentation Rate    Urinalysis With Culture If Indicated -    Nausea        Relevant Orders    Amylase    Lipase    CBC & Differential    Comprehensive Metabolic Panel    C-reactive Protein    CT Abdomen Pelvis With Contrast    Pregnancy, Urine - Urine, Clean Catch    Sedimentation Rate    Urinalysis With Culture If Indicated -      Patient comes in today complaining of noticing blood in every stool for about 1 week.  She states that she will also have lower back pain as well as left lower quadrant pain.  She has been noticing nausea for about 2 weeks.  She denies issues with constipation but states that it is normal for her to go about every other day or so.  She has not had a BM today or yesterday but states that she feels like that she probably will later on today.  She did show a picture of her stool that was formed but did have blood noted around it in the water.  The toilet bowl was not filled with blood.  Patient denies hard stool and denies pain with her bowel movements.  Will proceed with CT hopefully tomorrow morning.  We will get labs at that time as well.  We will also refer to GI.    With worsening symptoms to go to ER.        Follow Up   Return if symptoms worsen or fail to improve.  Patient was given instructions and counseling regarding her condition or for health maintenance advice. Please see specific information pulled into the AVS if appropriate.

## 2021-07-27 ENCOUNTER — LAB (OUTPATIENT)
Dept: LAB | Facility: HOSPITAL | Age: 31
End: 2021-07-27

## 2021-07-27 ENCOUNTER — HOSPITAL ENCOUNTER (OUTPATIENT)
Dept: CT IMAGING | Facility: HOSPITAL | Age: 31
Discharge: HOME | End: 2021-07-27

## 2021-07-27 DIAGNOSIS — R10.32 LEFT LOWER QUADRANT PAIN: ICD-10-CM

## 2021-07-27 DIAGNOSIS — R11.0 NAUSEA: ICD-10-CM

## 2021-07-27 DIAGNOSIS — K92.1 BLOOD IN STOOL: ICD-10-CM

## 2021-07-27 LAB
ALBUMIN SERPL-MCNC: 4.5 G/DL (ref 3.5–5.2)
ALBUMIN/GLOB SERPL: 1.7 G/DL
ALP SERPL-CCNC: 54 U/L (ref 39–117)
ALT SERPL W P-5'-P-CCNC: 10 U/L (ref 1–33)
AMYLASE SERPL-CCNC: 46 U/L (ref 28–100)
ANION GAP SERPL CALCULATED.3IONS-SCNC: 9 MMOL/L (ref 5–15)
AST SERPL-CCNC: 16 U/L (ref 1–32)
AUTO MIXED CELLS #: 0.4 10*3/MM3 (ref 0.1–2.6)
AUTO MIXED CELLS %: 6.3 % (ref 0.1–24)
B-HCG UR QL: NEGATIVE
BACTERIA UR QL AUTO: ABNORMAL /HPF
BILIRUB SERPL-MCNC: 0.2 MG/DL (ref 0–1.2)
BILIRUB UR QL STRIP: NEGATIVE
BUN SERPL-MCNC: 8 MG/DL (ref 6–20)
BUN/CREAT SERPL: 15.4 (ref 7–25)
CALCIUM SPEC-SCNC: 9.3 MG/DL (ref 8.6–10.5)
CHLORIDE SERPL-SCNC: 104 MMOL/L (ref 98–107)
CLARITY UR: CLEAR
CO2 SERPL-SCNC: 26 MMOL/L (ref 22–29)
COLOR UR: YELLOW
CREAT BLDA-MCNC: 0.6 MG/DL (ref 0.6–1.3)
CREAT SERPL-MCNC: 0.52 MG/DL (ref 0.57–1)
CRP SERPL-MCNC: <0.3 MG/DL (ref 0–0.5)
ERYTHROCYTE [DISTWIDTH] IN BLOOD BY AUTOMATED COUNT: 13.2 % (ref 12.3–15.4)
ERYTHROCYTE [SEDIMENTATION RATE] IN BLOOD: 2 MM/HR (ref 0–20)
GFR SERPL CREATININE-BSD FRML MDRD: 138 ML/MIN/1.73
GLOBULIN UR ELPH-MCNC: 2.6 GM/DL
GLUCOSE SERPL-MCNC: 90 MG/DL (ref 65–99)
GLUCOSE UR STRIP-MCNC: NEGATIVE MG/DL
HCT VFR BLD AUTO: 40.1 % (ref 34–46.6)
HGB BLD-MCNC: 12.9 G/DL (ref 12–15.9)
HGB UR QL STRIP.AUTO: NEGATIVE
HYALINE CASTS UR QL AUTO: ABNORMAL /LPF
KETONES UR QL STRIP: NEGATIVE
LEUKOCYTE ESTERASE UR QL STRIP.AUTO: ABNORMAL
LIPASE SERPL-CCNC: 32 U/L (ref 13–60)
LYMPHOCYTES # BLD AUTO: 2 10*3/MM3 (ref 0.7–3.1)
LYMPHOCYTES NFR BLD AUTO: 30 % (ref 19.6–45.3)
MCH RBC QN AUTO: 30.7 PG (ref 26.6–33)
MCHC RBC AUTO-ENTMCNC: 32.2 G/DL (ref 31.5–35.7)
MCV RBC AUTO: 95.5 FL (ref 79–97)
NEUTROPHILS NFR BLD AUTO: 4.1 10*3/MM3 (ref 1.7–7)
NEUTROPHILS NFR BLD AUTO: 63.7 % (ref 42.7–76)
NITRITE UR QL STRIP: NEGATIVE
PH UR STRIP.AUTO: 7 [PH] (ref 5–8)
PLATELET # BLD AUTO: 192 10*3/MM3 (ref 140–450)
PMV BLD AUTO: 10.6 FL (ref 6–12)
POTASSIUM SERPL-SCNC: 4.3 MMOL/L (ref 3.5–5.2)
PROT SERPL-MCNC: 7.1 G/DL (ref 6–8.5)
PROT UR QL STRIP: NEGATIVE
RBC # BLD AUTO: 4.2 10*6/MM3 (ref 3.77–5.28)
RBC # UR: ABNORMAL /HPF
REF LAB TEST METHOD: ABNORMAL
SODIUM SERPL-SCNC: 139 MMOL/L (ref 136–145)
SP GR UR STRIP: <=1.005 (ref 1–1.03)
SQUAMOUS #/AREA URNS HPF: ABNORMAL /HPF
UROBILINOGEN UR QL STRIP: ABNORMAL
WBC # BLD AUTO: 6.5 10*3/MM3 (ref 3.4–10.8)
WBC UR QL AUTO: ABNORMAL /HPF

## 2021-07-27 PROCEDURE — 81025 URINE PREGNANCY TEST: CPT | Performed by: NURSE PRACTITIONER

## 2021-07-27 PROCEDURE — 81001 URINALYSIS AUTO W/SCOPE: CPT | Performed by: NURSE PRACTITIONER

## 2021-07-27 PROCEDURE — 82565 ASSAY OF CREATININE: CPT

## 2021-07-27 PROCEDURE — 85025 COMPLETE CBC W/AUTO DIFF WBC: CPT | Performed by: NURSE PRACTITIONER

## 2021-07-27 PROCEDURE — 36415 COLL VENOUS BLD VENIPUNCTURE: CPT

## 2021-07-27 PROCEDURE — 86140 C-REACTIVE PROTEIN: CPT

## 2021-07-27 PROCEDURE — 80053 COMPREHEN METABOLIC PANEL: CPT | Performed by: NURSE PRACTITIONER

## 2021-07-27 PROCEDURE — 87086 URINE CULTURE/COLONY COUNT: CPT | Performed by: NURSE PRACTITIONER

## 2021-07-27 PROCEDURE — 25010000002 IOPAMIDOL 61 % SOLUTION: Performed by: NURSE PRACTITIONER

## 2021-07-27 PROCEDURE — 74177 CT ABD & PELVIS W/CONTRAST: CPT

## 2021-07-27 PROCEDURE — 85652 RBC SED RATE AUTOMATED: CPT

## 2021-07-27 PROCEDURE — 83690 ASSAY OF LIPASE: CPT

## 2021-07-27 PROCEDURE — 82150 ASSAY OF AMYLASE: CPT

## 2021-07-27 RX ADMIN — IOPAMIDOL 50 ML: 612 INJECTION, SOLUTION INTRAVENOUS at 10:02

## 2021-07-27 RX ADMIN — IOPAMIDOL 100 ML: 612 INJECTION, SOLUTION INTRAVENOUS at 10:02

## 2021-07-27 NOTE — PROGRESS NOTES
CT shoes moderate stool Needs to do miralax until loose stools noted  Pelvic congestion- schedule apt with gyn

## 2021-07-28 ENCOUNTER — NURSE TRIAGE (OUTPATIENT)
Dept: CALL CENTER | Facility: HOSPITAL | Age: 31
End: 2021-07-28

## 2021-07-28 ENCOUNTER — TELEPHONE (OUTPATIENT)
Dept: FAMILY MEDICINE CLINIC | Facility: CLINIC | Age: 31
End: 2021-07-28

## 2021-07-28 LAB — BACTERIA SPEC AEROBE CULT: ABNORMAL

## 2021-07-28 NOTE — TELEPHONE ENCOUNTER
Caller: Laura Morales    Relationship to patient: Self    Best call back number: [phone]    Patient has questions in regards to her CT scan. She is requesting a call back.

## 2021-07-29 ENCOUNTER — TELEPHONE (OUTPATIENT)
Dept: FAMILY MEDICINE CLINIC | Facility: CLINIC | Age: 31
End: 2021-07-29

## 2021-07-29 NOTE — TELEPHONE ENCOUNTER
----- Message from NAEEM Bains sent at 2021  6:48 AM CDT -----  Urine shows growth of normal nadja. As long as pt isn't having any symptoms Dr. Valentine doesn't recommend treating. Other labs okay.     Pt contacted office back. Verified pt name and . Informed of the results above per provider. Pt uv of all and agreed stating no sx.

## 2021-07-29 NOTE — TELEPHONE ENCOUNTER
Caller states her stepson has COVID and now she has cough and sneezing. Caller does report seasonal allergies when asked. Caller states she is having shortness of breath and was advised per guideline. Caller advised to contact ER of choice and let them know she has been exposed to COVID and now having symptoms.         Reason for Disposition  • MILD difficulty breathing (e.g., minimal/no SOB at rest, SOB with walking, pulse <100)    Additional Information  • Negative: SEVERE difficulty breathing (e.g., struggling for each breath, speaks in single words)  • Negative: Difficult to awaken or acting confused (e.g., disoriented, slurred speech)  • Negative: Bluish (or gray) lips or face now  • Negative: Shock suspected (e.g., cold/pale/clammy skin, too weak to stand, low BP, rapid pulse)  • Negative: Sounds like a life-threatening emergency to the triager  • Negative: [1] COVID-19 exposure AND [2] has not completed COVID-19 vaccine series AND [3] no symptoms  • Negative: [1] COVID-19 exposure AND [2] completed COVID-19 vaccine series (fully vaccinated) AND [3] no symptoms  • Negative: COVID-19 vaccine reaction suspected (e.g., fever, headache, muscle aches) occurring during days 1-3 after getting vaccine  • Negative: COVID-19 vaccine, questions about  • Negative: [1] COVID-19 vaccine series completed (fully vaccinated) AND [2] new-onset of COVID-19 symptoms BUT [3] no known exposure  • Negative: [1] Had lab test confirmed COVID-19 infection within last 3 months AND [2] new-onset of COVID-19 symptoms BUT [3] no known exposure  • Negative: [1] Lives with someone known to have influenza (flu test positive) AND [2] flu-like symptoms (e.g., cough, runny nose, sore throat, SOB; with or without fever)  • Negative: [1] Adult with possible COVID-19 symptoms AND [2] triager concerned about severity of symptoms or other causes  • Negative: COVID-19 and breastfeeding, questions about  • Negative: SEVERE or constant chest pain or  "pressure (Exception: mild central chest pain, present only when coughing)  • Negative: MODERATE difficulty breathing (e.g., speaks in phrases, SOB even at rest, pulse 100-120)  • Negative: [1] Headache AND [2] stiff neck (can't touch chin to chest)    Answer Assessment - Initial Assessment Questions  1. COVID-19 DIAGNOSIS: \"Who made your Coronavirus (COVID-19) diagnosis?\" \"Was it confirmed by a positive lab test?\" If not diagnosed by a HCP, ask \"Are there lots of cases (community spread) where you live?\" (See public health department website, if unsure)      Cough   2. COVID-19 EXPOSURE: \"Was there any known exposure to COVID before the symptoms began?\" CDC Definition of close contact: within 6 feet (2 meters) for a total of 15 minutes or more over a 24-hour period.       Sitting on couch and stepson lives in home   3. ONSET: \"When did the COVID-19 symptoms start?\"        Two days ago   4. WORST SYMPTOM: \"What is your worst symptom?\" (e.g., cough, fever, shortness of breath, muscle aches)      Cough   5. COUGH: \"Do you have a cough?\" If Yes, ask: \"How bad is the cough?\"       Yes   6. FEVER: \"Do you have a fever?\" If Yes, ask: \"What is your temperature, how was it measured, and when did it start?\"      Don't know   7. RESPIRATORY STATUS: \"Describe your breathing?\" (e.g., shortness of breath, wheezing, unable to speak)        Shortness of breath and states it's mild   8. BETTER-SAME-WORSE: \"Are you getting better, staying the same or getting worse compared to yesterday?\"  If getting worse, ask, \"In what way?\"      Same   9. HIGH RISK DISEASE: \"Do you have any chronic medical problems?\" (e.g., asthma, heart or lung disease, weak immune system, obesity, etc.)      Denies   10. PREGNANCY: \"Is there any chance you are pregnant?\" \"When was your last menstrual period?\"        Denies   11. OTHER SYMPTOMS: \"Do you have any other symptoms?\"  (e.g., chills, fatigue, headache, loss of smell or taste, muscle pain, sore throat; " new loss of smell or taste especially support the diagnosis of COVID-19)       Taste comes and goes    Protocols used: CORONAVIRUS (COVID-19) DIAGNOSED OR SUSPECTED-ADULT-

## 2021-09-06 ENCOUNTER — HOSPITAL ENCOUNTER (OUTPATIENT)
Dept: GENERAL RADIOLOGY | Facility: HOSPITAL | Age: 31
Discharge: HOME | End: 2021-09-06
Admitting: NURSE PRACTITIONER

## 2021-09-06 DIAGNOSIS — R10.9 ACUTE LEFT FLANK PAIN: ICD-10-CM

## 2021-09-06 PROCEDURE — 74018 RADEX ABDOMEN 1 VIEW: CPT

## 2021-09-07 ENCOUNTER — APPOINTMENT (OUTPATIENT)
Dept: GENERAL RADIOLOGY | Facility: HOSPITAL | Age: 31
End: 2021-09-07

## 2021-09-20 DIAGNOSIS — F90.2 ATTENTION DEFICIT HYPERACTIVITY DISORDER (ADHD), COMBINED TYPE: ICD-10-CM

## 2021-09-20 RX ORDER — DEXTROAMPHETAMINE SULFATE, DEXTROAMPHETAMINE SACCHARATE, AMPHETAMINE SULFATE AND AMPHETAMINE ASPARTATE 6.25; 6.25; 6.25; 6.25 MG/1; MG/1; MG/1; MG/1
CAPSULE, EXTENDED RELEASE ORAL
Qty: 30 CAPSULE | Refills: 0 | Status: SHIPPED | OUTPATIENT
Start: 2021-09-20 | End: 2021-11-02

## 2021-09-20 NOTE — TELEPHONE ENCOUNTER
Pt called to check status of refill - she is asking for a call when sent to pharmacy - today is her only day off work and she would like to  ASAP     Phone: [phone]

## 2021-11-02 ENCOUNTER — OFFICE VISIT (OUTPATIENT)
Dept: FAMILY MEDICINE CLINIC | Facility: CLINIC | Age: 31
End: 2021-11-02

## 2021-11-02 VITALS
DIASTOLIC BLOOD PRESSURE: 72 MMHG | BODY MASS INDEX: 19.49 KG/M2 | OXYGEN SATURATION: 100 % | HEIGHT: 63 IN | HEART RATE: 97 BPM | SYSTOLIC BLOOD PRESSURE: 111 MMHG | RESPIRATION RATE: 16 BRPM | TEMPERATURE: 98.4 F | WEIGHT: 110 LBS

## 2021-11-02 DIAGNOSIS — F90.2 ATTENTION DEFICIT HYPERACTIVITY DISORDER (ADHD), COMBINED TYPE: ICD-10-CM

## 2021-11-02 DIAGNOSIS — F90.2 ATTENTION DEFICIT HYPERACTIVITY DISORDER (ADHD), COMBINED TYPE: Primary | ICD-10-CM

## 2021-11-02 DIAGNOSIS — K21.00 GASTROESOPHAGEAL REFLUX DISEASE WITH ESOPHAGITIS WITHOUT HEMORRHAGE: Primary | ICD-10-CM

## 2021-11-02 PROCEDURE — 99213 OFFICE O/P EST LOW 20 MIN: CPT | Performed by: NURSE PRACTITIONER

## 2021-11-02 RX ORDER — DEXTROAMPHETAMINE SACCHARATE, AMPHETAMINE ASPARTATE MONOHYDRATE, DEXTROAMPHETAMINE SULFATE AND AMPHETAMINE SULFATE 6.25; 6.25; 6.25; 6.25 MG/1; MG/1; MG/1; MG/1
25 CAPSULE, EXTENDED RELEASE ORAL EVERY MORNING
Qty: 30 CAPSULE | Refills: 0 | Status: SHIPPED | OUTPATIENT
Start: 2021-11-02 | End: 2021-12-02

## 2021-11-02 RX ORDER — PANTOPRAZOLE SODIUM 40 MG/1
40 TABLET, DELAYED RELEASE ORAL DAILY
Qty: 30 TABLET | Refills: 5 | Status: SHIPPED | OUTPATIENT
Start: 2021-11-02 | End: 2022-03-21 | Stop reason: SDUPTHER

## 2021-11-02 RX ORDER — DEXTROAMPHETAMINE SACCHARATE, AMPHETAMINE ASPARTATE MONOHYDRATE, DEXTROAMPHETAMINE SULFATE AND AMPHETAMINE SULFATE 6.25; 6.25; 6.25; 6.25 MG/1; MG/1; MG/1; MG/1
25 CAPSULE, EXTENDED RELEASE ORAL EVERY MORNING
Qty: 30 CAPSULE | Refills: 0 | Status: SHIPPED | OUTPATIENT
Start: 2021-11-30 | End: 2021-12-29

## 2021-11-02 NOTE — PROGRESS NOTES
"Chief Complaint  ADHD (She states she needs med refill.  N oconcerns or complaints.)    Subjective    History of Present Illness      Patient presents to [facility] for   ADHD/Mood HPI    Visit for:  follow-up. Most recent visit was 3 months ago.  Interim changes to follow up on today: no change in medication  Work/School Performance:  going well  Cognitive:  able to focus    Behavior  Hyperactivity: is not hyperactive  Impulsivity: no impulsivity  Tasking: able to initiate tasks    Social  ADHD social/impulsive symptoms:  not impatient    Behavioral health  Behavior: no concerns  Emotional coping: demonstrates feelings of no concerns       Review of Systems    I have reviewed and agree with the HPI and ROS information as above.  Leti Lemus, APRN     Objective   Vital Signs:   /72 (BP Location: Right arm)   Pulse 97   Temp 98.4 °F (36.9 °C)   Resp 16   Ht 160 cm (63\")   Wt 49.9 kg (110 lb)   SpO2 100%   BMI 19.49 kg/m²       Physical Exam  Constitutional:       Appearance: Normal appearance. She is well-developed.   HENT:      Head: Normocephalic and atraumatic.      Right Ear: External ear normal.      Left Ear: External ear normal.      Nose: Nose normal. No nasal tenderness or congestion.      Mouth/Throat:      Lips: Pink. No lesions.      Mouth: Mucous membranes are moist. No oral lesions.      Dentition: Normal dentition.      Pharynx: Oropharynx is clear. No pharyngeal swelling, oropharyngeal exudate or posterior oropharyngeal erythema.   Eyes:      General: Lids are normal. Vision grossly intact. No scleral icterus.        Right eye: No discharge.         Left eye: No discharge.      Extraocular Movements: Extraocular movements intact.      Conjunctiva/sclera: Conjunctivae normal.      Right eye: Right conjunctiva is not injected.      Left eye: Left conjunctiva is not injected.      Pupils: Pupils are equal, round, and reactive to light. "   Cardiovascular:      Rate and Rhythm: Normal rate and regular rhythm.      Heart sounds: Normal heart sounds. No murmur heard.  No gallop.    Pulmonary:      Effort: Pulmonary effort is normal.      Breath sounds: Normal breath sounds and air entry. No wheezing, rhonchi or rales.   Musculoskeletal:         General: No tenderness or deformity. Normal range of motion.      Cervical back: Full passive range of motion without pain, normal range of motion and neck supple.      Right lower leg: No edema.      Left lower leg: No edema.   Skin:     General: Skin is warm and dry.      Coloration: Skin is not jaundiced.      Findings: No rash.   Neurological:      Mental Status: She is alert and oriented to person, place, and time.      Cranial Nerves: Cranial nerves are intact.      Sensory: Sensation is intact.      Motor: Motor function is intact.      Coordination: Coordination is intact.      Gait: Gait is intact.   Psychiatric:         Attention and Perception: Attention normal.         Mood and Affect: Mood and affect normal.         Behavior: Behavior is not hyperactive. Behavior is cooperative.         Thought Content: Thought content normal.         Judgment: Judgment normal.          Result Review  Data Reviewed:                   Assessment and Plan      Problem List Items Addressed This Visit        Mental Health    Attention deficit hyperactivity disorder (ADHD), combined type      Other Visit Diagnoses     Gastroesophageal reflux disease with esophagitis without hemorrhage    -  Primary    Relevant Medications    pantoprazole (Protonix) 40 MG EC tablet      Patient comes in today to follow-up on ADHD.  Symptoms are well controlled.  Wishes to continue same.    Patient requests a medication for acid reflux.  She has been trying over-the-counter medicines with not much improvement.  Will start on Protonix.        Follow Up   Return in about 3 months (around 2/2/2022).  Patient was given instructions and  counseling regarding her condition or for health maintenance advice. Please see specific information pulled into the AVS if appropriate.

## 2021-12-29 DIAGNOSIS — F90.2 ATTENTION DEFICIT HYPERACTIVITY DISORDER (ADHD), COMBINED TYPE: ICD-10-CM

## 2021-12-29 RX ORDER — DEXTROAMPHETAMINE SULFATE, DEXTROAMPHETAMINE SACCHARATE, AMPHETAMINE SULFATE AND AMPHETAMINE ASPARTATE 6.25; 6.25; 6.25; 6.25 MG/1; MG/1; MG/1; MG/1
CAPSULE, EXTENDED RELEASE ORAL
Qty: 30 CAPSULE | Refills: 0 | Status: SHIPPED | OUTPATIENT
Start: 2021-12-29 | End: 2022-02-02 | Stop reason: SDUPTHER

## 2022-01-01 ENCOUNTER — HOSPITAL ENCOUNTER (EMERGENCY)
Facility: HOSPITAL | Age: 32
Discharge: HOME | End: 2022-01-01
Attending: EMERGENCY MEDICINE | Admitting: EMERGENCY MEDICINE

## 2022-01-01 ENCOUNTER — NURSE TRIAGE (OUTPATIENT)
Dept: CALL CENTER | Facility: HOSPITAL | Age: 32
End: 2022-01-01

## 2022-01-01 ENCOUNTER — TELEPHONE (OUTPATIENT)
Dept: EMERGENCY DEPT | Facility: HOSPITAL | Age: 32
End: 2022-01-01

## 2022-01-01 VITALS
BODY MASS INDEX: 18.77 KG/M2 | DIASTOLIC BLOOD PRESSURE: 74 MMHG | WEIGHT: 102 LBS | TEMPERATURE: 98.8 F | OXYGEN SATURATION: 100 % | SYSTOLIC BLOOD PRESSURE: 123 MMHG | HEIGHT: 62 IN | RESPIRATION RATE: 16 BRPM | HEART RATE: 106 BPM

## 2022-01-01 DIAGNOSIS — R52 BODY ACHES: ICD-10-CM

## 2022-01-01 DIAGNOSIS — H66.91 ACUTE OTITIS MEDIA, RIGHT: Primary | ICD-10-CM

## 2022-01-01 DIAGNOSIS — R05.9 COUGH: ICD-10-CM

## 2022-01-01 LAB
FLUAV RNA RESP QL NAA+PROBE: DETECTED
FLUBV RNA RESP QL NAA+PROBE: NOT DETECTED
SARS-COV-2 RNA RESP QL NAA+PROBE: NOT DETECTED

## 2022-01-01 PROCEDURE — 25010000002 CEFTRIAXONE PER 250 MG: Performed by: EMERGENCY MEDICINE

## 2022-01-01 PROCEDURE — 99283 EMERGENCY DEPT VISIT LOW MDM: CPT

## 2022-01-01 PROCEDURE — C9803 HOPD COVID-19 SPEC COLLECT: HCPCS

## 2022-01-01 PROCEDURE — 0 LIDOCAINE 1 % SOLUTION 20 ML VIAL: Performed by: EMERGENCY MEDICINE

## 2022-01-01 PROCEDURE — 87636 SARSCOV2 & INF A&B AMP PRB: CPT | Performed by: EMERGENCY MEDICINE

## 2022-01-01 PROCEDURE — 25010000002 DEXAMETHASONE PER 1 MG: Performed by: EMERGENCY MEDICINE

## 2022-01-01 PROCEDURE — 96372 THER/PROPH/DIAG INJ SC/IM: CPT

## 2022-01-01 RX ORDER — PREDNISONE 50 MG/1
50 TABLET ORAL DAILY
Qty: 5 TABLET | Refills: 0 | Status: SHIPPED | OUTPATIENT
Start: 2022-01-01 | End: 2022-01-06

## 2022-01-01 RX ORDER — BENZONATATE 100 MG/1
100 CAPSULE ORAL 3 TIMES DAILY PRN
Qty: 15 CAPSULE | Refills: 0 | Status: SHIPPED | OUTPATIENT
Start: 2022-01-01 | End: 2022-01-06

## 2022-01-01 RX ORDER — CEFDINIR 300 MG/1
300 CAPSULE ORAL 2 TIMES DAILY
Qty: 14 CAPSULE | Refills: 0 | Status: SHIPPED | OUTPATIENT
Start: 2022-01-01 | End: 2022-01-08

## 2022-01-01 RX ORDER — DEXAMETHASONE SODIUM PHOSPHATE 10 MG/ML
10 INJECTION INTRAMUSCULAR; INTRAVENOUS ONCE
Status: COMPLETED | OUTPATIENT
Start: 2022-01-01 | End: 2022-01-01

## 2022-01-01 RX ADMIN — DEXAMETHASONE SODIUM PHOSPHATE 10 MG: 10 INJECTION INTRAMUSCULAR; INTRAVENOUS at 17:40

## 2022-01-01 RX ADMIN — LIDOCAINE HYDROCHLORIDE 1 G: 10 INJECTION, SOLUTION INFILTRATION; PERINEURAL at 17:39

## 2022-01-01 NOTE — TELEPHONE ENCOUNTER
Day 3 of symptoms Mild ache in head and chest   Complete body aches has taken multiple doses of Ibuprofen  Cough started 11 pm last night congestion in chest sore throat  Little hard to catch breath.  Caller wanting COVID testing. Is at work now in furniture sales. Advised Isolation until testing completed.  Advised [facility] for testing tomorow    Reason for Disposition  • [1] COVID-19 infection suspected by caller or triager AND [2] mild symptoms (cough, fever, or others) AND [3] negative COVID-19 rapid test    Additional Information  • Negative: SEVERE difficulty breathing (e.g., struggling for each breath, speaks in single words)  • Negative: Difficult to awaken or acting confused (e.g., disoriented, slurred speech)  • Negative: Bluish (or gray) lips or face now  • Negative: Shock suspected (e.g., cold/pale/clammy skin, too weak to stand, low BP, rapid pulse)  • Negative: Sounds like a life-threatening emergency to the triager  • Negative: [1] COVID-19 exposure AND [2] no symptoms  • Negative: COVID-19 vaccine reaction suspected (e.g., fever, headache, muscle aches) occurring 1 to 3 days after getting vaccine  • Negative: COVID-19 vaccine, questions about  • Negative: [1] Lives with someone known to have influenza (flu test positive) AND [2] flu-like symptoms (e.g., cough, runny nose, sore throat, SOB; with or without fever)  • Negative: [1] Adult with possible COVID-19 symptoms AND [2] triager concerned about severity of symptoms or other causes  • Negative: COVID-19 and breastfeeding, questions about  • Negative: SEVERE or constant chest pain or pressure (Exception: mild central chest pain, present only when coughing)  • Negative: MODERATE difficulty breathing (e.g., speaks in phrases, SOB even at rest, pulse 100-120)  • Negative: [1] Headache AND [2] stiff neck (can't touch chin to chest)  • Negative: MILD difficulty breathing (e.g., minimal/no SOB at rest, SOB with walking, pulse <100)  • Negative: Chest pain or  "pressure  • Negative: Patient sounds very sick or weak to the triager  • Negative: Fever > 103 F (39.4 C)  • Negative: [1] Fever > 101 F (38.3 C) AND [2] age > 60 years  • Negative: [1] Fever > 100.0 F (37.8 C) AND [2] bedridden (e.g., nursing home patient, CVA, chronic illness, recovering from surgery)  • Negative: HIGH RISK for severe COVID complications (e.g., age > 64 years, obesity with BMI > 25, pregnant, chronic lung disease or other chronic medical condition)  (Exception: Already seen by PCP and no new or worsening symptoms.)  • Negative: [1] HIGH RISK patient AND [2] influenza is widespread in the community AND [3] ONE OR MORE respiratory symptoms: cough, sore throat, runny or stuffy nose  • Negative: [1] HIGH RISK patient AND [2] influenza exposure within the last 7 days AND [3] ONE OR MORE respiratory symptoms: cough, sore throat, runny or stuffy nose    Answer Assessment - Initial Assessment Questions  1. COVID-19 DIAGNOSIS: \"Who made your Coronavirus (COVID-19) diagnosis?\" \"Was it confirmed by a positive lab test?\" If not diagnosed by a HCP, ask \"Are there lots of cases (community spread) where you live?\" (See public health department website, if unsure)      No testing has been done  2. COVID-19 EXPOSURE: \"Was there any known exposure to COVID before the symptoms began?\" CDC Definition of close contact: within 6 feet (2 meters) for a total of 15 minutes or more over a 24-hour period.   [place] grandmother tested positive, not sure if child has symptoms  Child was around the grandmother who  yesterday  3. ONSET: \"When did the COVID-19 symptoms start?\"     Day 3 of symptoms  4. WORST SYMPTOM: \"What is your worst symptom?\" (e.g., cough, fever, shortness of breath, muscle aches)  Body aches  5. COUGH: \"Do you have a cough?\" If Yes, ask: \"How bad is the cough?\"      Occasional cough  6. FEVER: \"Do you have a fever?\" If Yes, ask: \"What is your temperature, how was it measured, and when did it start?\"  No " "fever but has not checked her temperature chills  7. RESPIRATORY STATUS: \"Describe your breathing?\" (e.g., shortness of breath, wheezing, unable to speak)   Feels a little winded  8. BETTER-SAME-WORSE: \"Are you getting better, staying the same or getting worse compared to yesterday?\"  If getting worse, ask, \"In what way?\"    worse  9. HIGH RISK DISEASE: \"Do you have any chronic medical problems?\" (e.g., asthma, heart or lung disease, weak immune system, obesity, etc.)     Normal healthy  10. PREGNANCY: \"Is there any chance you are pregnant?\" \"When was your last menstrual period?\"     na  11. OTHER SYMPTOMS: \"Do you have any other symptoms?\"  (e.g., chills, fatigue, headache, loss of smell or taste, muscle pain, sore throat; new loss of smell or taste especially support the diagnosis of COVID-19)  Achy headache congestion sore throat    Protocols used: CORONAVIRUS (COVID-19) DIAGNOSED OR SUSPECTED-ADULT-AH      "

## 2022-01-01 NOTE — ED PROVIDER NOTES
Emergency Medicine Provider Note    Subjective:    HISTORY OF PRESENT ILLNESS     This is a very pleasant 31 y.o. female with a past medical history of ADD who presents to the emergency department today with a chief complaint of congestion, chills, right ear pain, and fatigue for 3 days.  Gradual in onset.  Constant.  Moderate.  No exacerbating relieving factors no associated symptoms.  No neck stiffness.  No rashes.  No abdominal pain, nausea, or vomiting.  States this is how she usually feels when she gets an ear infection.          History is obtained from the patient.       Review of Systems: All other systems are reviewed and are negative other than noted in the HPI.    Past Medical History:  Past Medical History:   Diagnosis Date   • ADD (attention deficit disorder)    • Fracture of rib of left side    • Seasonal allergies    • UTI (urinary tract infection)        Allergies:  Allergies   Allergen Reactions   • Acetaminophen-Codeine Hives       Past Surgical History:  Past Surgical History:   Procedure Laterality Date   • BREAST SURGERY      AUGMENTATION   • BREAST SURGERY      REDO OF PREVIOUS SURGERY   • CYSTECTOMY Right 2014    right breast   • SEPTOPLASTY, RESECTION INFERIOR TURBINATES Bilateral 3/18/2021    Procedure: septoplasty with turbinate reduction;  Surgeon: Mart Betts Jr., MD;  Location: [facility];  Service: ENT;  Laterality: Bilateral;       Family History:  Family History   Problem Relation Age of Onset   • Gout Father    • Cancer Maternal Grandmother         breast   • Diabetes Maternal Grandmother        Social History:  Social History     Socioeconomic History   • Marital status: Single   Tobacco Use   • Smoking status: Current Some Day Smoker     Packs/day: 0.25     Types: Cigarettes     Last attempt to quit: 2012     Years since quittin.3   • Smokeless tobacco: Never Used   Vaping Use   • Vaping Use: Never used   Substance and Sexual Activity   • Alcohol use: Yes      Alcohol/week: 1.0 standard drink     Types: 1 Glasses of wine per week     Comment: occasional   • Drug use: No   • Sexual activity: Defer       Home Medications:  Prior to Admission medications    Medication Sig Start Date End Date Taking? Authorizing Provider   Adderall XR 25 MG 24 hr capsule TAKE ONE CAPSULE BY MOUTH EVERY MORNING 12/29/21   Kris Valentine MD   baclofen (LIORESAL) 10 MG tablet Take 10 mg by mouth Daily As Needed.    ProviderBogdan MD   benzonatate (TESSALON) 100 MG capsule Take 1 capsule by mouth 3 (Three) Times a Day As Needed for Cough for up to 5 days. 1/1/22 1/6/22  Facundo Gallardo MD   cefdinir (OMNICEF) 300 MG capsule Take 1 capsule by mouth 2 (Two) Times a Day for 7 days. 1/1/22 1/8/22  Facundo Gallardo MD   gabapentin (NEURONTIN) 300 MG capsule TAKE 1 CAPSULE (300 MG TOTAL) BY MOUTH 3 TIMES A DAY. 4/12/21   Bogdan Guo MD   pantoprazole (Protonix) 40 MG EC tablet Take 1 tablet by mouth Daily. 11/2/21   Leti Lemus APRN   predniSONE (DELTASONE) 50 MG tablet Take 1 tablet by mouth Daily for 5 days. 1/1/22 1/6/22  Facundo Gallardo MD   tamsulosin (FLOMAX) 0.4 MG capsule 24 hr capsule Take 1 capsule by mouth At Night As Needed (flank pain). 9/6/21   Melida Kirkland APRN   valACYclovir (Valtrex) 500 MG tablet Take 500 mg by mouth Daily.    ProviderBogdan MD         Objective:    PHYSICAL EXAM     Vitals:   Vitals:    01/01/22 1722   BP: 123/74   Pulse: 106   Resp: 16   Temp: 98.8 °F (37.1 °C)   SpO2: 100%     GENERAL: Well appearing, in no acute distress.   HEENT: Moist mucous membranes, oropharynx clear without lesions, exudates, thrush.  Erythema and bulging of the tympanic membrane on the right.  No signs of otitis externa.  No signs of mastoiditis.  EYES: No scleral icterus, conjunctivae clear.   NECK: No cervical lymphadenopathy, no stiffness.  CARDIAC: Normal rate, regular rhythm, no murmurs, 2+  peripheral pulses in all four extremities, normal capillary refill.   PULMONARY: Normal work of breathing on room air, lungs are clear to auscultation bilaterally without wheezes, crackles, rhonchi.  ABDOMINAL: Normal bowel sounds, abdomen is soft, non-tender, non-distended, no hepatomegaly or splenomegaly.   MUSCULOSKELETAL: Normal range of motion, no lower extremity edema.  NEUROLOGIC: Alert and oriented x 3, EOM grossly intact and moves all four extremities with normal strength.  SKIN: Warm and dry without rashes.   PSYCHIATRIC: Mood and affect are normal.     PROCEDURES     Procedures    LAB AND RADIOLOGY RESULTS     Lab Results (last 24 hours)     ** No results found for the last 24 hours. **          No results found.    ED course:    Medications   cefTRIAXone (ROCEPHIN) 1 g in lidocaine (XYLOCAINE) 1 % IM only syringe (1 g Intramuscular Given 1/1/22 1739)   dexamethasone (DECADRON) injection 10 mg (10 mg Intramuscular Given 1/1/22 1740)          Amount and/or complexity of data reviewed:    • Clinical lab tests ordered and reviewed.        Risk of significant complications, morbidity, and/or mortality.    •  Presenting problem: moderate  •  Diagnostic procedures: moderate  •  Management options: moderate        MEDICAL DECISION MAKING     Patient presents with body aches, cough, ear pain. Upon arrival to the Emergency Department the patient is in no acute distress vital signs are reassuring.  No signs of meningitis on physical examination.  No signs of pneumonia on physical exam.  Low concern for acute intra-abdominal infection with no abdominal pain, tenderness, nausea, or vomiting.  She does have acute otitis media on the right for which she requested a steroid shot and a Rocephin shot which she is given.  She has been tested for COVID-19 and the flu and will be called with these results.  She is discharged in good condition with reassuring vital signs and she is given commonsense return precautions which  she verbalizes understanding of.        Diagnosis:    Final diagnoses:   Acute otitis media, right   Body aches   Cough         ED Disposition:     ED Disposition     ED Disposition Condition Comment    Discharge Stable           Carlin Amado  [address] [place] [id number] [phone]    Schedule an appointment as soon as possible for a visit in 2 days           Medication List      New Prescriptions    benzonatate 100 MG capsule  Commonly known as: TESSALON  Take 1 capsule by mouth 3 (Three) Times a Day As Needed for Cough for up to 5 days.     cefdinir 300 MG capsule  Commonly known as: OMNICEF  Take 1 capsule by mouth 2 (Two) Times a Day for 7 days.     predniSONE 50 MG tablet  Commonly known as: DELTASONE  Take 1 tablet by mouth Daily for 5 days.           Where to Get Your Medications      You can get these medications from any pharmacy    Bring a paper prescription for each of these medications  · benzonatate 100 MG capsule  · cefdinir 300 MG capsule  · predniSONE 50 MG tablet              Facundo Gallardo MD  01/03/22 1011

## 2022-01-03 ENCOUNTER — NURSE TRIAGE (OUTPATIENT)
Dept: CALL CENTER | Facility: HOSPITAL | Age: 32
End: 2022-01-03

## 2022-01-03 NOTE — TELEPHONE ENCOUNTER
"Explained that they are both respiratory viruses and you can have them at the same time although one cannot turn into the other.  Questions answered.  Reason for Disposition  • Health Information question, no triage required and triager able to answer question    Additional Information  • Negative: [1] Caller is not with the adult (patient) AND [2] reporting urgent symptoms  • Negative: Lab result questions  • Negative: Medication questions  • Negative: Caller can't be reached by phone  • Negative: Caller has already spoken to PCP or another triager  • Negative: RN needs further essential information from caller in order to complete triage  • Negative: Requesting regular office appointment  • Negative: [1] Caller requesting NON-URGENT health information AND [2] PCP's office is the best resource    Answer Assessment - Initial Assessment Questions  1. REASON FOR CALL or QUESTION: \"What is your reason for calling today?\" or \"How can I best help you?\" or \"What question do you have that I can help answer?\"      Can the flu turn into covid?    Protocols used: INFORMATION ONLY CALL - NO TRIAGE-ADULT-    "

## 2022-01-05 ENCOUNTER — NURSE TRIAGE (OUTPATIENT)
Dept: CALL CENTER | Facility: HOSPITAL | Age: 32
End: 2022-01-05

## 2022-02-02 DIAGNOSIS — F90.2 ATTENTION DEFICIT HYPERACTIVITY DISORDER (ADHD), COMBINED TYPE: ICD-10-CM

## 2022-02-02 RX ORDER — DEXTROAMPHETAMINE SACCHARATE, AMPHETAMINE ASPARTATE MONOHYDRATE, DEXTROAMPHETAMINE SULFATE AND AMPHETAMINE SULFATE 6.25; 6.25; 6.25; 6.25 MG/1; MG/1; MG/1; MG/1
25 CAPSULE, EXTENDED RELEASE ORAL EVERY MORNING
Qty: 30 CAPSULE | Refills: 0 | Status: SHIPPED | OUTPATIENT
Start: 2022-02-02 | End: 2022-03-21

## 2022-02-02 NOTE — TELEPHONE ENCOUNTER
Caller: Laura Morales    Relationship: Self    Best call back number: [phone]    Requested Prescriptions:   Requested Prescriptions     Pending Prescriptions Disp Refills   • amphetamine-dextroamphetamine XR (Adderall XR) 25 MG 24 hr capsule 30 capsule 0     Sig: Take 1 capsule by mouth Every Morning        Pharmacy where request should be sent: [address] - [phone] PH - [phone] FX     Does the patient have less than a 3 day supply:  [x] Yes  [] No    Nael Barron Rep   02/02/22 09:49 CST

## 2022-03-14 DIAGNOSIS — F90.2 ATTENTION DEFICIT HYPERACTIVITY DISORDER (ADHD), COMBINED TYPE: ICD-10-CM

## 2022-03-14 RX ORDER — DEXTROAMPHETAMINE SULFATE, DEXTROAMPHETAMINE SACCHARATE, AMPHETAMINE SULFATE AND AMPHETAMINE ASPARTATE 6.25; 6.25; 6.25; 6.25 MG/1; MG/1; MG/1; MG/1
CAPSULE, EXTENDED RELEASE ORAL
Qty: 30 CAPSULE | Refills: 1 | OUTPATIENT
Start: 2022-03-14

## 2022-03-15 DIAGNOSIS — F90.2 ATTENTION DEFICIT HYPERACTIVITY DISORDER (ADHD), COMBINED TYPE: ICD-10-CM

## 2022-03-15 RX ORDER — DEXTROAMPHETAMINE SULFATE, DEXTROAMPHETAMINE SACCHARATE, AMPHETAMINE SULFATE AND AMPHETAMINE ASPARTATE 6.25; 6.25; 6.25; 6.25 MG/1; MG/1; MG/1; MG/1
CAPSULE, EXTENDED RELEASE ORAL
Qty: 30 CAPSULE | Refills: 0 | OUTPATIENT
Start: 2022-03-15

## 2022-03-21 ENCOUNTER — OFFICE VISIT (OUTPATIENT)
Dept: FAMILY MEDICINE CLINIC | Facility: CLINIC | Age: 32
End: 2022-03-21

## 2022-03-21 VITALS
WEIGHT: 103.2 LBS | HEIGHT: 62 IN | TEMPERATURE: 98.4 F | OXYGEN SATURATION: 96 % | SYSTOLIC BLOOD PRESSURE: 122 MMHG | DIASTOLIC BLOOD PRESSURE: 50 MMHG | BODY MASS INDEX: 18.99 KG/M2 | HEART RATE: 71 BPM

## 2022-03-21 DIAGNOSIS — F90.2 ATTENTION DEFICIT HYPERACTIVITY DISORDER (ADHD), COMBINED TYPE: Primary | ICD-10-CM

## 2022-03-21 DIAGNOSIS — K21.00 GASTROESOPHAGEAL REFLUX DISEASE WITH ESOPHAGITIS WITHOUT HEMORRHAGE: ICD-10-CM

## 2022-03-21 PROCEDURE — 99214 OFFICE O/P EST MOD 30 MIN: CPT

## 2022-03-21 RX ORDER — PANTOPRAZOLE SODIUM 40 MG/1
40 TABLET, DELAYED RELEASE ORAL DAILY
Qty: 30 TABLET | Refills: 5 | Status: SHIPPED | OUTPATIENT
Start: 2022-03-21 | End: 2022-03-31

## 2022-03-21 RX ORDER — DEXTROAMPHETAMINE SACCHARATE, AMPHETAMINE ASPARTATE MONOHYDRATE, DEXTROAMPHETAMINE SULFATE AND AMPHETAMINE SULFATE 6.25; 6.25; 6.25; 6.25 MG/1; MG/1; MG/1; MG/1
25 CAPSULE, EXTENDED RELEASE ORAL EVERY MORNING
Qty: 30 CAPSULE | Refills: 0 | Status: SHIPPED | OUTPATIENT
Start: 2022-03-21 | End: 2022-08-04 | Stop reason: SDUPTHER

## 2022-03-21 NOTE — PROGRESS NOTES
"Chief Complaint  Med Refill    Subjective          Laura Morales presents to [facility]  ADHD medication refill/follow up       Objective   Vital Signs:   /50 (BP Location: Right arm, Patient Position: Sitting, Cuff Size: Adult)   Pulse 71   Temp 98.4 °F (36.9 °C)   Ht 157.5 cm (62\")   Wt 46.8 kg (103 lb 3.2 oz)   SpO2 96%   BMI 18.88 kg/m²     Physical Exam  Constitutional:       Appearance: Normal appearance. She is well-developed.   HENT:      Head: Normocephalic and atraumatic.      Right Ear: External ear normal.      Left Ear: External ear normal.      Nose: Nose normal. No nasal tenderness or congestion.      Mouth/Throat:      Lips: Pink. No lesions.      Mouth: Mucous membranes are moist. No oral lesions.      Dentition: Normal dentition.      Pharynx: Oropharynx is clear. No pharyngeal swelling, oropharyngeal exudate or posterior oropharyngeal erythema.   Eyes:      General: Lids are normal. Vision grossly intact. No scleral icterus.        Right eye: No discharge.         Left eye: No discharge.      Extraocular Movements: Extraocular movements intact.      Conjunctiva/sclera: Conjunctivae normal.      Right eye: Right conjunctiva is not injected.      Left eye: Left conjunctiva is not injected.      Pupils: Pupils are equal, round, and reactive to light.   Cardiovascular:      Rate and Rhythm: Normal rate and regular rhythm.      Heart sounds: Normal heart sounds. No murmur heard.    No gallop.   Pulmonary:      Effort: Pulmonary effort is normal.      Breath sounds: Normal breath sounds and air entry. No wheezing, rhonchi or rales.   Musculoskeletal:         General: No tenderness or deformity. Normal range of motion.      Cervical back: Full passive range of motion without pain, normal range of motion and neck supple.      Right lower leg: No edema.      Left lower leg: No edema.   Skin:     General: Skin is warm and dry.      Coloration: Skin is not " jaundiced.      Findings: No rash.   Neurological:      Mental Status: She is alert and oriented to person, place, and time.      Cranial Nerves: Cranial nerves are intact.      Sensory: Sensation is intact.      Motor: Motor function is intact.      Coordination: Coordination is intact.      Gait: Gait is intact.   Psychiatric:         Attention and Perception: Attention normal.         Mood and Affect: Mood and affect normal.         Behavior: Behavior is not hyperactive. Behavior is cooperative.         Thought Content: Thought content normal.         Judgment: Judgment normal.        Result Review :                 Assessment and Plan    Diagnoses and all orders for this visit:    1. Attention deficit hyperactivity disorder (ADHD), combined type (Primary)    2. Gastroesophageal reflux disease with esophagitis without hemorrhage  -     pantoprazole (Protonix) 40 MG EC tablet; Take 1 tablet by mouth Daily.  Dispense: 30 tablet; Refill: 5    Patient is here today for ADHD medication refills.   Patient is doing well at this time with no complaints or concerns. Patient will need UTD UDS at next visit.  GERD stable on protonix      ADHD Follow up:    Reji report initiated in office and pending. ADRS (Adult ADHD Assessment Form) reviewed in detail, scanned in chart, and has been reviewed at time of appointment.  All questions, including medication and side effects, were discussed in detail at time of patient's visit. Patient will continue same medication which was discussed at today's visit. Patient is to return in 3 month(s).      Follow Up   Return in about 3 months (around 6/21/2022) for ADHD.  Patient was given instructions and counseling regarding her condition or for health maintenance advice. Please see specific information pulled into the AVS if appropriate.

## 2022-03-31 ENCOUNTER — HOSPITAL ENCOUNTER (OUTPATIENT)
Dept: GENERAL RADIOLOGY | Facility: HOSPITAL | Age: 32
Discharge: HOME | End: 2022-03-31
Admitting: NURSE PRACTITIONER

## 2022-03-31 PROCEDURE — 87491 CHLMYD TRACH DNA AMP PROBE: CPT | Performed by: NURSE PRACTITIONER

## 2022-03-31 PROCEDURE — 87661 TRICHOMONAS VAGINALIS AMPLIF: CPT | Performed by: NURSE PRACTITIONER

## 2022-03-31 PROCEDURE — 73502 X-RAY EXAM HIP UNI 2-3 VIEWS: CPT

## 2022-03-31 PROCEDURE — 87591 N.GONORRHOEAE DNA AMP PROB: CPT | Performed by: NURSE PRACTITIONER

## 2022-04-15 DIAGNOSIS — F90.2 ATTENTION DEFICIT HYPERACTIVITY DISORDER (ADHD), COMBINED TYPE: Primary | ICD-10-CM

## 2022-04-15 RX ORDER — DEXTROAMPHETAMINE SACCHARATE, AMPHETAMINE ASPARTATE MONOHYDRATE, DEXTROAMPHETAMINE SULFATE AND AMPHETAMINE SULFATE 6.25; 6.25; 6.25; 6.25 MG/1; MG/1; MG/1; MG/1
25 CAPSULE, EXTENDED RELEASE ORAL EVERY MORNING
Qty: 30 CAPSULE | Refills: 0 | Status: SHIPPED | OUTPATIENT
Start: 2022-04-15 | End: 2022-10-05

## 2022-06-02 ENCOUNTER — NURSE TRIAGE (OUTPATIENT)
Dept: CALL CENTER | Facility: HOSPITAL | Age: 32
End: 2022-06-02

## 2022-06-02 NOTE — TELEPHONE ENCOUNTER
"It has been over a year since her last physicial.  Advised she needs to see her PCP for a physical and labs.  Diabetes runs in her family and her symptoms are concerning for this. Other possibilities discussed but stressed that she needs to be evaluated.     Reason for Disposition  • [1] Numbness or tingling in feet AND [2] new or increased    Additional Information  • Negative: Followed a foot injury  • Negative: Diabetes mellitus  • Negative: Ankle pain is main symptom  • Negative: Thigh or calf pain is main symptom  • Negative: Entire foot is cool or blue in comparison to other foot  • Negative: Purple or black skin on foot or toe  • Negative: [1] Red area or streak AND [2] fever  • Negative: [1] Swollen foot AND [2] fever  • Negative: Patient sounds very sick or weak to the triager  • Negative: [1] SEVERE pain (e.g., excruciating, unable to do any normal activities) AND [2] not improved after 2 hours of pain medicine  • Negative: [1] Looks infected (spreading redness, pus) AND [2] large red area (> 2 in. or 5 cm)  • Negative: Looks like a boil, infected sore, or deep ulcer  • Negative: [1] Redness of the skin AND [2] no fever  • Negative: [1] Swollen foot AND [2] no fever  (Exceptions: localized bump from bunions, calluses, insect bite, sting)  • Negative: Numbness in one foot (i.e., loss of sensation)  • Negative: [1] MODERATE pain (e.g., interferes with normal activities, limping) AND [2] present > 3 days    Answer Assessment - Initial Assessment Questions  1. ONSET: \"When did the pain start?\"       Two weeks  2. LOCATION: \"Where is the pain located?\"       Bottom of feet  3. PAIN: \"How bad is the pain?\"    (Scale 1-10; or mild, moderate, severe)   - MILD (1-3): doesn't interfere with normal activities.    - MODERATE (4-7): interferes with normal activities (e.g., work or school) or awakens from sleep, limping.    - SEVERE (8-10): excruciating pain, unable to do any normal activities, unable to walk.       " "Sanchez like fired  4. WORK OR EXERCISE: \"Has there been any recent work or exercise that involved this part of the body?\"       no  5. CAUSE: \"What do you think is causing the foot pain?\"      Unknown, diabetes does run in her family  6. OTHER SYMPTOMS: \"Do you have any other symptoms?\" (e.g., leg pain, rash, fever, numbness)      Excessive thirst, weight loss, hair loss, dry skin dry mouth, decreased urination despite drinking adequate amounts of water  7. PREGNANCY: \"Is there any chance you are pregnant?\" \"When was your last menstrual period?\"      no    Protocols used: FOOT PAIN-ADULT-AH      "

## 2022-06-08 PROCEDURE — U0004 COV-19 TEST NON-CDC HGH THRU: HCPCS | Performed by: NURSE PRACTITIONER

## 2022-06-10 ENCOUNTER — HOSPITAL ENCOUNTER (EMERGENCY)
Facility: HOSPITAL | Age: 32
Discharge: HOME | End: 2022-06-11
Admitting: STUDENT IN AN ORGANIZED HEALTH CARE EDUCATION/TRAINING PROGRAM

## 2022-06-10 ENCOUNTER — NURSE TRIAGE (OUTPATIENT)
Dept: CALL CENTER | Facility: HOSPITAL | Age: 32
End: 2022-06-10

## 2022-06-10 DIAGNOSIS — U07.1 COVID-19: Primary | ICD-10-CM

## 2022-06-10 PROCEDURE — 99283 EMERGENCY DEPT VISIT LOW MDM: CPT

## 2022-06-10 RX ORDER — ONDANSETRON 4 MG/1
4 TABLET, ORALLY DISINTEGRATING ORAL ONCE
Status: COMPLETED | OUTPATIENT
Start: 2022-06-11 | End: 2022-06-11

## 2022-06-10 RX ORDER — KETOROLAC TROMETHAMINE 30 MG/ML
30 INJECTION, SOLUTION INTRAMUSCULAR; INTRAVENOUS ONCE
Status: COMPLETED | OUTPATIENT
Start: 2022-06-11 | End: 2022-06-11

## 2022-06-11 VITALS
SYSTOLIC BLOOD PRESSURE: 93 MMHG | BODY MASS INDEX: 17.75 KG/M2 | HEART RATE: 98 BPM | WEIGHT: 94 LBS | RESPIRATION RATE: 18 BRPM | DIASTOLIC BLOOD PRESSURE: 51 MMHG | TEMPERATURE: 101.5 F | OXYGEN SATURATION: 96 % | HEIGHT: 61 IN

## 2022-06-11 LAB — SARS-COV-2 RNA PNL SPEC NAA+PROBE: DETECTED

## 2022-06-11 PROCEDURE — 63710000001 ONDANSETRON ODT 4 MG TABLET DISPERSIBLE: Performed by: NURSE PRACTITIONER

## 2022-06-11 PROCEDURE — 25010000002 KETOROLAC TROMETHAMINE PER 15 MG: Performed by: NURSE PRACTITIONER

## 2022-06-11 PROCEDURE — 96372 THER/PROPH/DIAG INJ SC/IM: CPT

## 2022-06-11 PROCEDURE — 87635 SARS-COV-2 COVID-19 AMP PRB: CPT | Performed by: NURSE PRACTITIONER

## 2022-06-11 RX ORDER — CETIRIZINE HYDROCHLORIDE, PSEUDOEPHEDRINE HYDROCHLORIDE 5; 120 MG/1; MG/1
1 TABLET, FILM COATED, EXTENDED RELEASE ORAL 2 TIMES DAILY
Qty: 15 TABLET | Refills: 0 | Status: SHIPPED | OUTPATIENT
Start: 2022-06-11 | End: 2022-08-04

## 2022-06-11 RX ORDER — FLUTICASONE PROPIONATE 50 MCG
2 SPRAY, SUSPENSION (ML) NASAL DAILY
Qty: 9.9 ML | Refills: 0 | Status: SHIPPED | OUTPATIENT
Start: 2022-06-11 | End: 2022-08-04

## 2022-06-11 RX ORDER — BENZONATATE 200 MG/1
200 CAPSULE ORAL 3 TIMES DAILY PRN
Qty: 15 CAPSULE | Refills: 0 | Status: SHIPPED | OUTPATIENT
Start: 2022-06-11 | End: 2022-08-04

## 2022-06-11 RX ADMIN — ONDANSETRON 4 MG: 4 TABLET, ORALLY DISINTEGRATING ORAL at 00:16

## 2022-06-11 RX ADMIN — KETOROLAC TROMETHAMINE 30 MG: 30 INJECTION, SOLUTION INTRAMUSCULAR; INTRAVENOUS at 00:16

## 2022-06-11 NOTE — ED PROVIDER NOTES
Subjective   Patient is a 31-year-old female who presents to the ER with COVID symptoms.  She states she was exposed by someone who has COVID.  She has a fever recorded in the ER of 102.1.  She complains of a migraine, cough with congestion, and body aches.  She has had no vomiting.  She denies this being the worst headache of her life.  She reports photophobia and pain throughout her head.          Review of Systems   Constitutional: Positive for fever.   HENT: Positive for congestion.    Eyes: Negative.    Respiratory: Positive for cough. Negative for shortness of breath.    Cardiovascular: Negative.  Negative for chest pain.   Gastrointestinal: Negative.  Negative for abdominal pain, diarrhea, nausea and vomiting.   Genitourinary: Negative.    Musculoskeletal: Positive for myalgias.   Skin: Negative.    Neurological: Positive for headaches.       Past Medical History:   Diagnosis Date   • ADD (attention deficit disorder)    • Fracture of rib of left side    • Seasonal allergies    • UTI (urinary tract infection)        Allergies   Allergen Reactions   • Acetaminophen-Codeine Hives       Past Surgical History:   Procedure Laterality Date   • BREAST SURGERY      AUGMENTATION   • BREAST SURGERY      REDO OF PREVIOUS SURGERY   • CYST REMOVAL     • SEPTOPLASTY, RESECTION INFERIOR TURBINATES Bilateral 2021    Procedure: septoplasty with turbinate reduction;  Surgeon: Mart Betts Jr., MD;  Location: [facility];  Service: ENT;  Laterality: Bilateral;       Family History   Problem Relation Age of Onset   • Gout Father    • Cancer Maternal Grandmother         breast   • Diabetes Maternal Grandmother        Social History     Socioeconomic History   • Marital status: Single   Tobacco Use   • Smoking status: Current Some Day Smoker     Packs/day: 0.25     Years: 10.00     Pack years: 2.50     Types: Cigarettes     Last attempt to quit: 2012     Years since quittin.7   • Smokeless tobacco: Never Used    Vaping Use   • Vaping Use: Never used   Substance and Sexual Activity   • Alcohol use: Yes     Alcohol/week: 1.0 standard drink     Types: 1 Glasses of wine per week     Comment: occasional   • Drug use: No   • Sexual activity: Defer           Objective   Physical Exam  Vitals and nursing note reviewed.   Constitutional:       Appearance: She is well-developed.   HENT:      Head: Normocephalic and atraumatic.      Right Ear: External ear normal.      Left Ear: External ear normal.      Nose: Nose normal.      Mouth/Throat:      Mouth: Mucous membranes are moist.      Pharynx: Oropharynx is clear.   Eyes:      Extraocular Movements: Extraocular movements intact.      Conjunctiva/sclera: Conjunctivae normal.      Pupils: Pupils are equal, round, and reactive to light.   Cardiovascular:      Rate and Rhythm: Normal rate and regular rhythm.      Heart sounds: Normal heart sounds.   Pulmonary:      Effort: Pulmonary effort is normal.      Breath sounds: Normal breath sounds.   Abdominal:      General: Bowel sounds are normal.      Palpations: Abdomen is soft.   Musculoskeletal:         General: Normal range of motion.      Cervical back: Normal range of motion and neck supple.   Skin:     General: Skin is warm and dry.      Capillary Refill: Capillary refill takes less than 2 seconds.   Neurological:      General: No focal deficit present.      Mental Status: She is alert and oriented to person, place, and time.   Psychiatric:         Mood and Affect: Mood normal.         Behavior: Behavior normal.         Thought Content: Thought content normal.         Judgment: Judgment normal.         Procedures           ED Course  ED Course as of 06/11/22 0152   Sat Jun 11, 2022   0122 Patient tested positive for covid. She will be discharged home in stable condition. [TW]      ED Course User Index  [TW] Oly Mckeon, APRN                                                 MDM  Number of Diagnoses or Management  Options  COVID-19: new and requires workup     Amount and/or Complexity of Data Reviewed  Clinical lab tests: ordered and reviewed  Discuss the patient with other providers: yes    Risk of Complications, Morbidity, and/or Mortality  Presenting problems: low  Diagnostic procedures: low  Management options: low    Patient Progress  Patient progress: improved      Final diagnoses:   COVID-19       ED Disposition  ED Disposition     ED Disposition   Discharge    Condition   Good    Comment   --             No follow-up provider specified.       Medication List      New Prescriptions    benzonatate 200 MG capsule  Commonly known as: TESSALON  Take 1 capsule by mouth 3 (Three) Times a Day As Needed for Cough.     cetirizine-pseudoephedrine 5-120 MG per 12 hr tablet  Commonly known as: ZyrTEC-D  Take 1 tablet by mouth 2 (Two) Times a Day.     fluticasone 50 MCG/ACT nasal spray  Commonly known as: FLONASE  2 sprays into the nostril(s) as directed by provider Daily.           Where to Get Your Medications      These medications were sent to [place], [place] - [address] [facility] [phone] [facility] [phone]   [address] [facility] [address]    Phone: [phone]   · benzonatate 200 MG capsule  · cetirizine-pseudoephedrine 5-120 MG per 12 hr tablet  · fluticasone 50 MCG/ACT nasal spray          Oly Mckeon, NAEEM  06/11/22 0152

## 2022-06-11 NOTE — TELEPHONE ENCOUNTER
Reason for Disposition  • Patient sounds very sick or weak to the triager    Additional Information  • Negative: COVID-19 lab test positive  • Negative: [1] Lives with someone known to have influenza (flu test positive) AND [2] flu-like symptoms (e.g., cough, runny nose, sore throat, SOB; with or without fever)  • Negative: [1] Symptoms of COVID-19 (e.g., cough, fever, SOB, or others) AND [2] doctor (or NP/PA) diagnosed COVID-19 based on symptoms  • Negative: [1] Symptoms of COVID-19 (e.g., cough, fever, SOB, or others) AND [2] lives in an area with community spread  • Negative: [1] Symptoms of COVID-19 (e.g., cough, fever, SOB, or others) AND [2] within 14 days of travel from high-risk area for COVID-19 community spread (identified by CDC)  • Negative: [1] Difficulty breathing (shortness of breath) occurs AND [2] onset > 14 days after COVID-19 EXPOSURE (Close Contact)  • Negative: [1] Dry cough occurs AND [2] onset > 14 days after COVID-19 EXPOSURE  • Negative: [1] Wet cough (i.e., white-yellow, yellow, green, or german colored sputum) AND [2] onset > 14 days after COVID-19 EXPOSURE  • Negative: [1] Common cold symptoms AND [2] onset > 14 days after COVID-19 EXPOSURE  • Negative: COVID-19 vaccine reaction suspected (e.g., fever, headache, muscle aches) occurring during days 1 to 3 after getting vaccine  • Negative: COVID-19 vaccine, questions about  • Negative: [1] CLOSE CONTACT COVID-19 EXPOSURE within last 14 days AND [2] needs COVID-19 lab test to return to work AND [3] NO symptoms  • Negative: [1] CLOSE CONTACT COVID-19 EXPOSURE within last 14 days AND [2] exposed person is a  (e.g., police or paramedic) AND [3] NO symptoms  • Negative: [1] CLOSE CONTACT COVID-19 EXPOSURE within last 14 days AND [2] exposed person is a healthcare worker who was NOT using all recommended personal protective equipment (e.g., a respirator-N95 mask, eye protection, gloves, and gown) AND [3] NO symptoms  •  Negative: [1] Living or working in a correctional facility, long-term care facility, or shelter (i.e., congregate setting; densely populated) AND [2] where an outbreak has occurred AND [3] NO symptoms  • Negative: [1] CLOSE CONTACT COVID-19 EXPOSURE within last 14 days AND [2] weak immune system (e.g., HIV positive, cancer chemo, splenectomy, organ transplant, chronic steroids) AND [3] NO symptoms  • Negative: [1] CLOSE CONTACT COVID-19 EXPOSURE within last 14 days AND [2] NO symptoms  • Negative: [1] CLOSE CONTACT COVID-19 EXPOSURE 15 or more days ago AND [2] NO symptoms  • Negative: [1] International travel AND [2] arrived home within last 14 days  • Negative: [1] Living in area with community spread (identified by local PHD) BUT [2] NO symptoms  • Negative: [1] Travel from area with community spread (identified by CDC) AND [2] within last 14 days BUT [3] NO symptoms  • Negative: [1] Does not meet COVID-19 EXPOSURE criteria BUT [2] living with someone who was exposed and who has no symptoms of COVID-19  • Negative: [1] Does not meet COVID-19 EXPOSURE criteria BUT [2] caller still concerned about COVID-19 EXPOSURE  • Negative: COVID-19 Testing, questions about  • Negative: COVID-19 Prevention and Healthy Living, questions about  • Negative: COVID -19 Disease, questions about  • [1] Symptoms of COVID-19 (e.g., cough, fever, SOB, or others) AND [2] within 14 days of EXPOSURE (close contact) with diagnosed or suspected COVID-19 patient  • Negative: SEVERE difficulty breathing (e.g., struggling for each breath, speaks in single words)  • Negative: Difficult to awaken or acting confused (e.g., disoriented, slurred speech)  • Negative: Bluish (or gray) lips or face now  • Negative: Shock suspected (e.g., cold/pale/clammy skin, too weak to stand, low BP, rapid pulse)  • Negative: Sounds like a life-threatening emergency to the triager  • Negative: [1] Diagnosed or suspected COVID-19 AND [2] symptoms lasting 3 or more  "weeks  • Negative: [1] COVID-19 exposure AND [2] no symptoms  • Negative: COVID-19 vaccine reaction suspected (e.g., fever, headache, muscle aches) occurring 1 to 3 days after getting vaccine  • Negative: COVID-19 vaccine, questions about  • Negative: [1] Lives with someone known to have influenza (flu test positive) AND [2] flu-like symptoms (e.g., cough, runny nose, sore throat, SOB; with or without fever)  • Negative: [1] Adult with possible COVID-19 symptoms AND [2] triager concerned about severity of symptoms or other causes  • Negative: COVID-19 and breastfeeding, questions about  • Negative: SEVERE or constant chest pain or pressure  (Exception: Mild central chest pain, present only when coughing.)  • Negative: MODERATE difficulty breathing (e.g., speaks in phrases, SOB even at rest, pulse 100-120)  • Negative: [1] Headache AND [2] stiff neck (can't touch chin to chest)  • Negative: Oxygen level (e.g., pulse oximetry) 90 percent or lower  • Negative: Chest pain or pressure    Answer Assessment - Initial Assessment Questions  1. COVID-19 EXPOSURE: \"Please describe how you were exposed to someone with a COVID-19 infection.\"      She was in same room as person with COV  ID.  2. PLACE of CONTACT: \"Where were you when you were exposed to COVID-19?\" (e.g., home, school, medical waiting room; which city?)      [place]    3. TYPE of CONTACT: \"How much contact was there?\" (e.g., sitting next to, live in same house, work in same office, same building)      In same room.    4. DURATION of CONTACT: \"How long were you in contact with the COVID-19 patient?\" (e.g., a few seconds, passed by person, a few minutes, 15 minutes or longer, live with the patient)      15 minutes or longer.    5. MASK: \"Were you wearing a mask?\" \"Was the other person wearing a mask?\" Note: wearing a mask reduces the risk of an otherwise close contact.      No   6. DATE of CONTACT: \"When did you have contact with a COVID-19 patient?\" (e.g., how many " "days ago)      Unknown    7. COMMUNITY SPREAD: \"Are there lots of cases of COVID-19 (community spread) where you live?\" (See public health department website, if unsure)        Moderate    8. SYMPTOMS: \"Do you have any symptoms?\" (e.g., fever, cough, breathing difficulty, loss of taste or smell)      Aches, headache, feels like she has fever but no thermometer.    9. VACCINE: \"Have you gotten the COVID-19 vaccine?\" If Yes, ask: \"Which one, how many shots, when did you get it?\"      None   10. BOOSTER: \"Have you received your COVID-19 booster?\" If Yes, ask: \"Which one and when did you get it?\"        No   11. PREGNANCY OR POSTPARTUM: \"Is there any chance you are pregnant?\" \"When was your last menstrual period?\" \"Did you deliver in the last 2 weeks?\"        unkown    12. HIGH RISK: \"Do you have any heart or lung problems?\" (e.g., asthma , COPD, heart failure) \"Do you have a weak immune system or other risk factors?\" (e.g., HIV positive, chemotherapy, renal failure, diabetes mellitus, sickle cell anemia, obesity)        No   13. TRAVEL: \"Have you traveled out of the country recently?\" If Yes, ask: \"When and where?\"  Note: Travel becomes less relevant if there is widespread community transmission where the patient lives.        Unknown    Answer Assessment - Initial Assessment Questions  1. COVID-19 DIAGNOSIS: \"Who made your COVID-19 diagnosis?\" \"Was it confirmed by a positive lab test or self-test?\" If not diagnosed by a doctor (or NP/PA), ask \"Are there lots of cases (community spread) where you live?\" Note: See public health department website, if unsure.      Has not been diagnosed.    2. COVID-19 EXPOSURE: \"Was there any known exposure to COVID before the symptoms began?\" CDC Definition of close contact: within 6 feet (2 meters) for a total of 15 minutes or more over a 24-hour period.       Yes in same room fof 15 minutes or longer.    3. ONSET: \"When did the COVID-19 symptoms start?\"       Yesterday with severe " "headache.    4. WORST SYMPTOM: \"What is your worst symptom?\" (e.g., cough, fever, shortness of breath, muscle aches)      Headache, severe body aches, chills,   5. COUGH: \"Do you have a cough?\" If Yes, ask: \"How bad is the cough?\"        Unknown    6. FEVER: \"Do you have a fever?\" If Yes, ask: \"What is your temperature, how was it measured, and when did it start?\"      Thought to have but no thermometer.    7. RESPIRATORY STATUS: \"Describe your breathing?\" (e.g., shortness of breath, wheezing, unable to speak)       No complaint of shortness of breath.    8. BETTER-SAME-WORSE: \"Are you getting better, staying the same or getting worse compared to yesterday?\"  If getting worse, ask, \"In what way?\"      Worse, headache still severe but not chills and body aches.    9. HIGH RISK DISEASE: \"Do you have any chronic medical problems?\" (e.g., asthma, heart or lung disease, weak immune system, obesity, etc.)      No   10. VACCINE: \"Have you had the COVID-19 vaccine?\" If Yes, ask: \"Which one, how many shots, when did you get it?\"        none  11. BOOSTER: \"Have you received your COVID-19 booster?\" If Yes, ask: \"Which one and when did you get it?\"        None   12. PREGNANCY: \"Is there any chance you are pregnant?\" \"When was your last menstrual period?\"        Unknown    13. OTHER SYMPTOMS: \"Do you have any other symptoms?\"  (e.g., chills, fatigue, headache, loss of smell or taste, muscle pain, sore throat)        Chills, fatigue, muscle pain, headache.    14. O2 SATURATION MONITOR:  \"Do you use an oxygen saturation monitor (pulse oximeter) at home?\" If Yes, ask \"What is your reading (oxygen level) today?\" \"What is your usual oxygen saturation reading?\" (e.g., 95%)        Does not have.    Protocols used: CORONAVIRUS (COVID-19) DIAGNOSED OR SUSPECTED-ADULT-AH, CORONAVIRUS (COVID-19) EXPOSURE-ADULT-AH      "

## 2022-06-11 NOTE — DISCHARGE INSTRUCTIONS
Remain in quarantine for minimum of 5 days and up to 7 days with continued sxs    Push fluids, maintain fever control by alternating tylenol and motrin as directed

## 2022-07-19 DIAGNOSIS — F90.2 ATTENTION DEFICIT HYPERACTIVITY DISORDER (ADHD), COMBINED TYPE: ICD-10-CM

## 2022-07-19 RX ORDER — DEXTROAMPHETAMINE SULFATE, DEXTROAMPHETAMINE SACCHARATE, AMPHETAMINE SULFATE AND AMPHETAMINE ASPARTATE 6.25; 6.25; 6.25; 6.25 MG/1; MG/1; MG/1; MG/1
CAPSULE, EXTENDED RELEASE ORAL
Qty: 30 CAPSULE | Refills: 0 | OUTPATIENT
Start: 2022-07-19

## 2022-07-22 DIAGNOSIS — F90.2 ATTENTION DEFICIT HYPERACTIVITY DISORDER (ADHD), COMBINED TYPE: ICD-10-CM

## 2022-07-22 RX ORDER — DEXTROAMPHETAMINE SULFATE, DEXTROAMPHETAMINE SACCHARATE, AMPHETAMINE SULFATE AND AMPHETAMINE ASPARTATE 6.25; 6.25; 6.25; 6.25 MG/1; MG/1; MG/1; MG/1
CAPSULE, EXTENDED RELEASE ORAL
Qty: 30 CAPSULE | Refills: 0 | OUTPATIENT
Start: 2022-07-22

## 2022-07-22 RX ORDER — DEXTROAMPHETAMINE SACCHARATE, AMPHETAMINE ASPARTATE MONOHYDRATE, DEXTROAMPHETAMINE SULFATE AND AMPHETAMINE SULFATE 6.25; 6.25; 6.25; 6.25 MG/1; MG/1; MG/1; MG/1
25 CAPSULE, EXTENDED RELEASE ORAL EVERY MORNING
Qty: 30 CAPSULE | Refills: 0 | OUTPATIENT
Start: 2022-07-22

## 2022-08-04 ENCOUNTER — TELEMEDICINE (OUTPATIENT)
Dept: FAMILY MEDICINE CLINIC | Facility: CLINIC | Age: 32
End: 2022-08-04

## 2022-08-04 VITALS
DIASTOLIC BLOOD PRESSURE: 70 MMHG | HEART RATE: 87 BPM | WEIGHT: 94 LBS | HEIGHT: 61 IN | BODY MASS INDEX: 17.75 KG/M2 | SYSTOLIC BLOOD PRESSURE: 98 MMHG

## 2022-08-04 DIAGNOSIS — F90.2 ATTENTION DEFICIT HYPERACTIVITY DISORDER (ADHD), COMBINED TYPE: Primary | ICD-10-CM

## 2022-08-04 DIAGNOSIS — G47.09 OTHER INSOMNIA: Primary | ICD-10-CM

## 2022-08-04 DIAGNOSIS — F90.2 ATTENTION DEFICIT HYPERACTIVITY DISORDER (ADHD), COMBINED TYPE: ICD-10-CM

## 2022-08-04 PROCEDURE — 99214 OFFICE O/P EST MOD 30 MIN: CPT | Performed by: NURSE PRACTITIONER

## 2022-08-04 RX ORDER — DEXTROAMPHETAMINE SACCHARATE, AMPHETAMINE ASPARTATE MONOHYDRATE, DEXTROAMPHETAMINE SULFATE AND AMPHETAMINE SULFATE 6.25; 6.25; 6.25; 6.25 MG/1; MG/1; MG/1; MG/1
25 CAPSULE, EXTENDED RELEASE ORAL EVERY MORNING
Qty: 30 CAPSULE | Refills: 0 | Status: SHIPPED | OUTPATIENT
Start: 2022-09-01 | End: 2022-10-01

## 2022-08-04 RX ORDER — TRAZODONE HYDROCHLORIDE 50 MG/1
50 TABLET ORAL NIGHTLY
Qty: 30 TABLET | Refills: 2 | Status: SHIPPED | OUTPATIENT
Start: 2022-08-04 | End: 2022-12-05

## 2022-08-04 RX ORDER — CLONIDINE HYDROCHLORIDE 0.1 MG/1
0.1 TABLET ORAL 3 TIMES DAILY PRN
COMMUNITY
End: 2023-03-15 | Stop reason: SDUPTHER

## 2022-08-04 RX ORDER — DEXTROAMPHETAMINE SACCHARATE, AMPHETAMINE ASPARTATE MONOHYDRATE, DEXTROAMPHETAMINE SULFATE AND AMPHETAMINE SULFATE 6.25; 6.25; 6.25; 6.25 MG/1; MG/1; MG/1; MG/1
25 CAPSULE, EXTENDED RELEASE ORAL EVERY MORNING
Qty: 30 CAPSULE | Refills: 0 | Status: SHIPPED | OUTPATIENT
Start: 2022-08-04 | End: 2022-09-03

## 2022-08-04 NOTE — PROGRESS NOTES
"You have chosen to receive care through a telehealth visit.  Do you consent to use a video/audio connection for your medical care today? Yes   This visit was completed by Telehealth using a computer. The location of the provider is [address] [place] [address]. The location of the patient is currently at her home.    Chief Complaint  ADHD    Subjective    History of Present Illness      Patient presents to [facility] for   Patient is being seen via telehealth today for ADHD follow-up.  She states that she is currently doing well on these medications.  She was trying clonidine for sleep, but she states this makes her too hung over feeling.  Wishes to try something else at this time.          Objective   Vital Signs:   BP 98/70   Pulse 87   Ht 154.9 cm (61\")   Wt 42.6 kg (94 lb)   BMI 17.76 kg/m²       Physical Exam  Vitals and nursing note reviewed.   Constitutional:       Appearance: Normal appearance. She is well-developed.   HENT:      Head: Normocephalic and atraumatic.      Mouth/Throat:      Lips: Pink. No lesions.   Eyes:      General: Lids are normal. Vision grossly intact.      Conjunctiva/sclera: Conjunctivae normal.      Right eye: Right conjunctiva is not injected.      Left eye: Left conjunctiva is not injected.   Pulmonary:      Effort: Pulmonary effort is normal.   Musculoskeletal:         General: Normal range of motion.      Cervical back: Full passive range of motion without pain, normal range of motion and neck supple.   Skin:     General: Skin is dry.   Neurological:      Mental Status: She is alert and oriented to person, place, and time.      Motor: Motor function is intact.   Psychiatric:         Mood and Affect: Mood and affect normal.         Judgment: Judgment normal.          PHQ-2 Depression Screening  Little interest or pleasure in doing things?     Feeling down, depressed, or hopeless?     PHQ-2 Total Score        PHQ-9 Depression " Screening  Little interest or pleasure in doing things?     Feeling down, depressed, or hopeless?     Trouble falling or staying asleep, or sleeping too much?     Feeling tired or having little energy?     Poor appetite or overeating?     Feeling bad about yourself - or that you are a failure or have let yourself or your family down?     Trouble concentrating on things, such as reading the newspaper or watching television?     Moving or speaking so slowly that other people could have noticed? Or the opposite - being so fidgety or restless that you have been moving around a lot more than usual?     Thoughts that you would be better off dead, or of hurting yourself in some way?     PHQ-9 Total Score     If you checked off any problems, how difficult have these problems made it for you to do your work, take care of things at home, or get along with other people?          Result Review  Data Reviewed:                   Assessment and Plan    Problem List Items Addressed This Visit        Mental Health    Attention deficit hyperactivity disorder (ADHD), combined type    Relevant Medications    traZODone (DESYREL) 50 MG tablet      Other Visit Diagnoses     Other insomnia    -  Primary    Relevant Medications    traZODone (DESYREL) 50 MG tablet        Patient is being seen via telehealth today for ADHD follow-up.  She states that she is currently doing well on these medications.  She was trying clonidine for sleep, but she states this makes her too hung over feeling.  Wishes to try something else at this time.  Will trial trazodone at this time.  Patient will need a urine drug screen at the next office visit.  ADHD Follow up:    Reji report initiated in office and is clean. all questions, including medication and side effects, were discussed in detail at time of patient's visit. Patient will continue same medication which was discussed at today's visit. Patient is to return in 3 month(s).    Last Urine Toxicity     LAST  URINE TOXICITY RESULTS Latest Ref Rng & Units 4/5/2021    AMPHETAMINES SCREEN, URINE Negative Positive(A)    BARBITURATES SCREEN Negative Negative    BENZODIAZEPINE SCREEN, URINE Negative Negative    BUPRENORPHINEUR Negative Negative    COCAINE SCREEN, URINE Negative Negative    METHADONE SCREEN, URINE Negative Negative    METHAMPHETAMINEUR Negative Negative           Urine Drug Screen Results: UDS RESULTS: Updated results needed          Follow Up   Return in about 3 months (around 11/4/2022) for ADHD follow up.  Patient was given instructions and counseling regarding her condition or for health maintenance advice. Please see specific information pulled into the AVS if appropriate.

## 2022-10-05 DIAGNOSIS — F90.2 ATTENTION DEFICIT HYPERACTIVITY DISORDER (ADHD), COMBINED TYPE: ICD-10-CM

## 2022-10-05 RX ORDER — DEXTROAMPHETAMINE SULFATE, DEXTROAMPHETAMINE SACCHARATE, AMPHETAMINE SULFATE AND AMPHETAMINE ASPARTATE 6.25; 6.25; 6.25; 6.25 MG/1; MG/1; MG/1; MG/1
CAPSULE, EXTENDED RELEASE ORAL
Qty: 30 CAPSULE | Refills: 0 | Status: SHIPPED | OUTPATIENT
Start: 2022-10-05 | End: 2022-11-28 | Stop reason: SDUPTHER

## 2022-11-22 DIAGNOSIS — F90.2 ATTENTION DEFICIT HYPERACTIVITY DISORDER (ADHD), COMBINED TYPE: ICD-10-CM

## 2022-11-22 RX ORDER — DEXTROAMPHETAMINE SULFATE, DEXTROAMPHETAMINE SACCHARATE, AMPHETAMINE SULFATE AND AMPHETAMINE ASPARTATE 6.25; 6.25; 6.25; 6.25 MG/1; MG/1; MG/1; MG/1
CAPSULE, EXTENDED RELEASE ORAL
Qty: 30 CAPSULE | Refills: 0 | OUTPATIENT
Start: 2022-11-22

## 2022-11-28 ENCOUNTER — LAB (OUTPATIENT)
Dept: LAB | Facility: HOSPITAL | Age: 32
End: 2022-11-28

## 2022-11-28 ENCOUNTER — OFFICE VISIT (OUTPATIENT)
Dept: FAMILY MEDICINE CLINIC | Facility: CLINIC | Age: 32
End: 2022-11-28

## 2022-11-28 VITALS
WEIGHT: 100 LBS | HEART RATE: 89 BPM | HEIGHT: 61 IN | BODY MASS INDEX: 18.88 KG/M2 | DIASTOLIC BLOOD PRESSURE: 77 MMHG | RESPIRATION RATE: 20 BRPM | SYSTOLIC BLOOD PRESSURE: 113 MMHG

## 2022-11-28 DIAGNOSIS — L65.9 HAIR LOSS: ICD-10-CM

## 2022-11-28 DIAGNOSIS — F90.9 ATTENTION DEFICIT HYPERACTIVITY DISORDER (ADHD), UNSPECIFIED ADHD TYPE: Primary | ICD-10-CM

## 2022-11-28 DIAGNOSIS — F90.9 ATTENTION DEFICIT HYPERACTIVITY DISORDER (ADHD), UNSPECIFIED ADHD TYPE: ICD-10-CM

## 2022-11-28 DIAGNOSIS — M79.89 FAT NECROSIS OF SKIN: ICD-10-CM

## 2022-11-28 DIAGNOSIS — F90.2 ATTENTION DEFICIT HYPERACTIVITY DISORDER (ADHD), COMBINED TYPE: Primary | ICD-10-CM

## 2022-11-28 LAB
ALBUMIN SERPL-MCNC: 4.5 G/DL (ref 3.5–5)
ALBUMIN/GLOB SERPL: 1.3 G/DL (ref 1.1–2.5)
ALP SERPL-CCNC: 70 U/L (ref 24–120)
ALT SERPL W P-5'-P-CCNC: 17 U/L (ref 0–35)
AMPHET+METHAMPHET UR QL: POSITIVE
AMPHETAMINES UR QL: NEGATIVE
ANION GAP SERPL CALCULATED.3IONS-SCNC: 12 MMOL/L (ref 4–13)
AST SERPL-CCNC: 26 U/L (ref 7–45)
AUTO MIXED CELLS #: 0.5 10*3/MM3 (ref 0.1–2.6)
AUTO MIXED CELLS %: 7.4 % (ref 0.1–24)
BARBITURATES UR QL SCN: NEGATIVE
BENZODIAZ UR QL SCN: NEGATIVE
BILIRUB SERPL-MCNC: 0.9 MG/DL (ref 0.1–1)
BUN SERPL-MCNC: 12 MG/DL (ref 5–21)
BUN/CREAT SERPL: 18.5
BUPRENORPHINE SERPL-MCNC: NEGATIVE NG/ML
CALCIUM SPEC-SCNC: 10.3 MG/DL (ref 8.4–10.4)
CANNABINOIDS SERPL QL: NEGATIVE
CHLORIDE SERPL-SCNC: 106 MMOL/L (ref 98–110)
CHOLEST SERPL-MCNC: 169 MG/DL (ref 130–200)
CO2 SERPL-SCNC: 28 MMOL/L (ref 24–31)
COCAINE UR QL: NEGATIVE
CREAT SERPL-MCNC: 0.65 MG/DL (ref 0.5–1.4)
EGFRCR SERPLBLD CKD-EPI 2021: 120.9 ML/MIN/1.73
ERYTHROCYTE [DISTWIDTH] IN BLOOD BY AUTOMATED COUNT: 12.8 % (ref 12.3–15.4)
GLOBULIN UR ELPH-MCNC: 3.4 GM/DL
GLUCOSE SERPL-MCNC: 99 MG/DL (ref 70–100)
HCT VFR BLD AUTO: 40.6 % (ref 34–46.6)
HDLC SERPL-MCNC: 57 MG/DL
HGB BLD-MCNC: 13.2 G/DL (ref 12–15.9)
LDLC SERPL CALC-MCNC: 94 MG/DL (ref 0–99)
LDLC/HDLC SERPL: 1.62 {RATIO}
LYMPHOCYTES # BLD AUTO: 1.5 10*3/MM3 (ref 0.7–3.1)
LYMPHOCYTES NFR BLD AUTO: 25.1 % (ref 19.6–45.3)
MCH RBC QN AUTO: 30.6 PG (ref 26.6–33)
MCHC RBC AUTO-ENTMCNC: 32.5 G/DL (ref 31.5–35.7)
MCV RBC AUTO: 94 FL (ref 79–97)
METHADONE UR QL SCN: NEGATIVE
NEUTROPHILS NFR BLD AUTO: 4.1 10*3/MM3 (ref 1.7–7)
NEUTROPHILS NFR BLD AUTO: 67.5 % (ref 42.7–76)
OPIATES UR QL: NEGATIVE
OXYCODONE UR QL SCN: NEGATIVE
PCP UR QL SCN: NEGATIVE
PLATELET # BLD AUTO: 241 10*3/MM3 (ref 140–450)
PMV BLD AUTO: 9.8 FL (ref 6–12)
POTASSIUM SERPL-SCNC: 4.2 MMOL/L (ref 3.5–5.3)
PROPOXYPH UR QL: NEGATIVE
PROT SERPL-MCNC: 7.9 G/DL (ref 6.3–8.7)
RBC # BLD AUTO: 4.32 10*6/MM3 (ref 3.77–5.28)
SODIUM SERPL-SCNC: 146 MMOL/L (ref 135–145)
TRICYCLICS UR QL SCN: NEGATIVE
TRIGL SERPL-MCNC: 99 MG/DL (ref 0–149)
VLDLC SERPL-MCNC: 18 MG/DL (ref 5–40)
WBC NRBC COR # BLD: 6.1 10*3/MM3 (ref 3.4–10.8)

## 2022-11-28 PROCEDURE — 82607 VITAMIN B-12: CPT

## 2022-11-28 PROCEDURE — 82306 VITAMIN D 25 HYDROXY: CPT

## 2022-11-28 PROCEDURE — 84144 ASSAY OF PROGESTERONE: CPT

## 2022-11-28 PROCEDURE — 99214 OFFICE O/P EST MOD 30 MIN: CPT | Performed by: NURSE PRACTITIONER

## 2022-11-28 PROCEDURE — 82670 ASSAY OF TOTAL ESTRADIOL: CPT

## 2022-11-28 PROCEDURE — 80061 LIPID PANEL: CPT

## 2022-11-28 PROCEDURE — 84403 ASSAY OF TOTAL TESTOSTERONE: CPT

## 2022-11-28 PROCEDURE — 80306 DRUG TEST PRSMV INSTRMNT: CPT

## 2022-11-28 PROCEDURE — 36415 COLL VENOUS BLD VENIPUNCTURE: CPT

## 2022-11-28 PROCEDURE — 80050 GENERAL HEALTH PANEL: CPT

## 2022-11-28 PROCEDURE — 84436 ASSAY OF TOTAL THYROXINE: CPT

## 2022-11-28 RX ORDER — HYDROXYZINE PAMOATE 50 MG/1
CAPSULE ORAL
COMMUNITY

## 2022-11-28 RX ORDER — DEXTROAMPHETAMINE SACCHARATE, AMPHETAMINE ASPARTATE MONOHYDRATE, DEXTROAMPHETAMINE SULFATE AND AMPHETAMINE SULFATE 6.25; 6.25; 6.25; 6.25 MG/1; MG/1; MG/1; MG/1
25 CAPSULE, EXTENDED RELEASE ORAL EVERY MORNING
Qty: 30 CAPSULE | Refills: 0 | Status: SHIPPED | OUTPATIENT
Start: 2022-12-26 | End: 2022-12-06

## 2022-11-28 RX ORDER — DEXTROAMPHETAMINE SACCHARATE, AMPHETAMINE ASPARTATE MONOHYDRATE, DEXTROAMPHETAMINE SULFATE AND AMPHETAMINE SULFATE 6.25; 6.25; 6.25; 6.25 MG/1; MG/1; MG/1; MG/1
25 CAPSULE, EXTENDED RELEASE ORAL EVERY MORNING
Qty: 30 CAPSULE | Refills: 0 | Status: SHIPPED | OUTPATIENT
Start: 2022-11-28 | End: 2022-12-28

## 2022-11-28 NOTE — PROGRESS NOTES
"Chief Complaint  ADHD    Subjective    History of Present Illness      Patient presents to [facility] for   History of Present Illness      ADHD/Mood HPI    Visit for:  follow-up. Most recent visit was 3 months ago.  Interim changes to follow up on today: no change in medication  Work/School Performance:  going well  Cognitive:  able to focus    Behavior  Hyperactivity: is not hyperactive  Impulsivity: no impulsivity and no unsafe behavior  Tasking: able to initiate tasks, able to complete tasks and able to mult-task  Pt is here for 3 month f/u for ADHD.  Pt also complaining of a \"dimple on her right buttcheek\" which she says is hurting and has gotten deeper.  When asked how long it's been there a couple of years but is gradually getting bigger and deeper..        Social  ADHD social/impulsive symptoms:  not impatient, does not blurt out inappropriate comments and no excessive talking    Behavioral health  Behavior: no concerns  Emotional coping: demonstrates feelings of no concerns       Review of Systems    I have reviewed and agree with the HPI information as above.  Mariaa Alejandre, APRN     Objective   Vital Signs:   /77   Pulse 89   Resp 20   Ht 154.9 cm (61\")   Wt 45.4 kg (100 lb)   BMI 18.89 kg/m²     BMI is within normal parameters. No other follow-up for BMI required.      Physical Exam  Vitals and nursing note reviewed.   Constitutional:       Appearance: Normal appearance. She is well-developed.   HENT:      Head: Normocephalic and atraumatic.      Right Ear: Tympanic membrane, ear canal and external ear normal.      Left Ear: Tympanic membrane, ear canal and external ear normal.      Nose: Nose normal. No septal deviation, nasal tenderness or congestion.      Mouth/Throat:      Lips: Pink. No lesions.      Mouth: Mucous membranes are moist. No oral lesions.      Dentition: Normal dentition.      Pharynx: Oropharynx is clear. No pharyngeal swelling, " oropharyngeal exudate or posterior oropharyngeal erythema.   Eyes:      General: Lids are normal. Vision grossly intact. No scleral icterus.        Right eye: No discharge.         Left eye: No discharge.      Extraocular Movements: Extraocular movements intact.      Conjunctiva/sclera: Conjunctivae normal.      Right eye: Right conjunctiva is not injected.      Left eye: Left conjunctiva is not injected.      Pupils: Pupils are equal, round, and reactive to light.   Neck:      Thyroid: No thyroid mass.      Trachea: Trachea normal.   Cardiovascular:      Rate and Rhythm: Normal rate and regular rhythm.      Heart sounds: Normal heart sounds. No murmur heard.    No gallop.   Pulmonary:      Effort: Pulmonary effort is normal.      Breath sounds: Normal breath sounds and air entry. No wheezing, rhonchi or rales.   Musculoskeletal:         General: No tenderness or deformity. Normal range of motion.      Cervical back: Full passive range of motion without pain, normal range of motion and neck supple.      Thoracic back: Normal.      Right lower leg: No edema.      Left lower leg: No edema.   Skin:     General: Skin is warm and dry.      Coloration: Skin is not jaundiced.      Findings: No rash.          Neurological:      Mental Status: She is alert and oriented to person, place, and time.      Cranial Nerves: Cranial nerves are intact.      Sensory: Sensation is intact.      Motor: Motor function is intact.      Coordination: Coordination is intact.      Gait: Gait is intact.      Deep Tendon Reflexes: Reflexes are normal and symmetric.   Psychiatric:         Mood and Affect: Mood and affect normal.         Behavior: Behavior normal.         Judgment: Judgment normal.          HARSHAL-7:      PHQ-2 Depression Screening  Little interest or pleasure in doing things? 0-->not at all   Feeling down, depressed, or hopeless? 0-->not at all   PHQ-2 Total Score 0     PHQ-9 Depression Screening  Little interest or pleasure in  doing things? 0-->not at all   Feeling down, depressed, or hopeless? 0-->not at all   Trouble falling or staying asleep, or sleeping too much?     Feeling tired or having little energy?     Poor appetite or overeating?     Feeling bad about yourself - or that you are a failure or have let yourself or your family down?     Trouble concentrating on things, such as reading the newspaper or watching television?     Moving or speaking so slowly that other people could have noticed? Or the opposite - being so fidgety or restless that you have been moving around a lot more than usual?     Thoughts that you would be better off dead, or of hurting yourself in some way?     PHQ-9 Total Score 0   If you checked off any problems, how difficult have these problems made it for you to do your work, take care of things at home, or get along with other people?        Result Review  Data Reviewed:                   Assessment and Plan      Diagnoses and all orders for this visit:    1. Attention deficit hyperactivity disorder (ADHD), unspecified ADHD type (Primary)  -     Urine Drug Screen - Urine, Clean Catch; Future    2. Hair loss  -     Estradiol; Future  -     Progesterone; Future  -     Testosterone; Future  -     TSH; Future  -     T4; Future  -     CBC Auto Differential; Future  -     Comprehensive Metabolic Panel; Future  -     Lipid Panel; Future  -     Vitamin D,25-Hydroxy; Future  -     Vitamin B12; Future    3. Fat necrosis of skin  -     Ambulatory Referral to General Surgery    Is here to follow-up on her ADHD medication.  She states that she is doing well with this.  Urine drug screen is due today.  Patient is also very emotional about losing lots of hair.  She did have COVID a few months ago.  I did explain unfortunately this could be a side effect.  However be happy to get lab work on her today.  Once those labs are back may consider spironolactone.  Patient is also concerned about a large dimple/crater in her right  butt cheek.  She states that she does have a history of this at an injection site, however due to the location on the buttock, there is no way that it could be trauma from an injection site.  I discussed with Dr. Valentine and he states that it has to be trauma from something.  It is subcu fat necrosis.  She states that it is very painful at times.  She states it is a stabbing burning and pain.  Will refer to Lela Bermeo at this time for a second opinion.  Plan  1.  Refill her Adderall today.  2.  Urine drug screen today.  3.  We will get labs today to find a reason for hair loss  4.  If labs are okay may consider Aldactone  5.  Refer patient to Dr. Raffi Bermeo for second opinion on the fat necrosis        Follow Up   Return in about 3 months (around 2/28/2023) for ADHD follow up.  Patient was given instructions and counseling regarding her condition or for health maintenance advice. Please see specific information pulled into the AVS if appropriate.

## 2022-11-29 ENCOUNTER — TELEPHONE (OUTPATIENT)
Dept: FAMILY MEDICINE CLINIC | Facility: CLINIC | Age: 32
End: 2022-11-29

## 2022-11-29 DIAGNOSIS — L65.9 ALOPECIA: Primary | ICD-10-CM

## 2022-11-29 LAB
25(OH)D3 SERPL-MCNC: 42.4 NG/ML (ref 30–100)
ESTRADIOL SERPL HS-MCNC: 71.7 PG/ML
PROGEST SERPL-MCNC: 0.55 NG/ML
T4 SERPL-MCNC: 7.06 MCG/DL (ref 4.5–11.7)
TESTOST SERPL-MCNC: 31.4 NG/DL (ref 8.4–48.1)
TSH SERPL DL<=0.05 MIU/L-ACNC: 1.15 UIU/ML (ref 0.27–4.2)
VIT B12 BLD-MCNC: 523 PG/ML (ref 211–946)

## 2022-11-29 RX ORDER — SPIRONOLACTONE 25 MG/1
25 TABLET ORAL DAILY
Qty: 25 TABLET | Refills: 5 | Status: SHIPPED | OUTPATIENT
Start: 2022-11-29 | End: 2023-03-28

## 2022-11-29 NOTE — TELEPHONE ENCOUNTER
----- Message from NAEEM Cabral sent at 11/29/2022  8:06 AM CST -----  Her labs are absolutely wonderful. No reason indicating hair loss.   It probably is unfortunately post covid effects  Offer to start her on a medication called aldactone. Its a diuretic, but helps with hair loss

## 2022-11-29 NOTE — TELEPHONE ENCOUNTER
Called patient with results of her labs are absolutely wonderful. No reason indicating hair loss.   It probably is unfortunately post covid effects  Offer to start her on a medication called aldactone. Its a diuretic, but helps with hair loss. She states she would like to try the aldactone. Routed to provider.

## 2022-12-03 ENCOUNTER — HOSPITAL ENCOUNTER (EMERGENCY)
Facility: HOSPITAL | Age: 32
Discharge: HOME | End: 2022-12-03
Admitting: STUDENT IN AN ORGANIZED HEALTH CARE EDUCATION/TRAINING PROGRAM

## 2022-12-03 VITALS
TEMPERATURE: 98 F | BODY MASS INDEX: 18.4 KG/M2 | SYSTOLIC BLOOD PRESSURE: 122 MMHG | RESPIRATION RATE: 16 BRPM | DIASTOLIC BLOOD PRESSURE: 81 MMHG | OXYGEN SATURATION: 100 % | HEIGHT: 62 IN | HEART RATE: 70 BPM | WEIGHT: 100 LBS

## 2022-12-03 DIAGNOSIS — S61.511A LACERATION OF SKIN OF RIGHT WRIST, INITIAL ENCOUNTER: Primary | ICD-10-CM

## 2022-12-03 PROCEDURE — 99282 EMERGENCY DEPT VISIT SF MDM: CPT

## 2022-12-03 PROCEDURE — 0 LIDOCAINE 1 % SOLUTION: Performed by: NURSE PRACTITIONER

## 2022-12-03 RX ORDER — LIDOCAINE HYDROCHLORIDE 10 MG/ML
10 INJECTION, SOLUTION INFILTRATION; PERINEURAL ONCE
Status: COMPLETED | OUTPATIENT
Start: 2022-12-03 | End: 2022-12-03

## 2022-12-03 RX ADMIN — LIDOCAINE HYDROCHLORIDE 10 ML: 10 INJECTION, SOLUTION INFILTRATION; PERINEURAL at 18:16

## 2022-12-03 NOTE — ED PROVIDER NOTES
Subjective   History of Present Illness  31 yof with PMH of ADD presents with c/o laceration to the R wrist.  She states she was slicing potatoes and the knife accidentally cut her wrist.  No active bleeding. No numbness or tingling present of the extremity.  She has full ROM of the wrist.  No active bleeding.  Her TD is UTD.        Review of Systems   Constitutional: Negative for activity change, appetite change, fatigue and fever.   HENT: Negative for congestion, ear pain, facial swelling and sore throat.    Eyes: Negative for discharge and visual disturbance.   Respiratory: Negative for apnea, chest tightness, shortness of breath, wheezing and stridor.    Cardiovascular: Negative for chest pain and palpitations.   Gastrointestinal: Negative for abdominal distention, abdominal pain, diarrhea, nausea and vomiting.   Genitourinary: Negative for difficulty urinating and dysuria.   Musculoskeletal: Negative for arthralgias and myalgias.   Skin: Positive for wound. Negative for rash.   Neurological: Negative for dizziness and seizures.   Psychiatric/Behavioral: Negative for agitation and confusion.       Past Medical History:   Diagnosis Date   • ADD (attention deficit disorder)    • Fracture of rib of left side    • Seasonal allergies    • UTI (urinary tract infection)        Allergies   Allergen Reactions   • Acetaminophen-Codeine Hives       Past Surgical History:   Procedure Laterality Date   • BREAST SURGERY      AUGMENTATION   • BREAST SURGERY      REDO OF PREVIOUS SURGERY   • CYST REMOVAL     • SEPTOPLASTY, RESECTION INFERIOR TURBINATES Bilateral 03/18/2021    Procedure: septoplasty with turbinate reduction;  Surgeon: Mart Betts Jr., MD;  Location: [facility];  Service: ENT;  Laterality: Bilateral;       Family History   Problem Relation Age of Onset   • Gout Father    • Cancer Maternal Grandmother         breast   • Diabetes Maternal Grandmother        Social History     Socioeconomic History   •  Marital status: Single   Tobacco Use   • Smoking status: Former     Packs/day: 0.25     Years: 10.00     Pack years: 2.50     Types: Cigarettes     Quit date: 9/12/2012     Years since quitting: 10.2     Passive exposure: Past   • Smokeless tobacco: Never   Vaping Use   • Vaping Use: Never used   Substance and Sexual Activity   • Alcohol use: Yes     Alcohol/week: 1.0 standard drink     Types: 1 Glasses of wine per week     Comment: occasional   • Drug use: No   • Sexual activity: Defer           Objective   Physical Exam  Vitals and nursing note reviewed.   Constitutional:       Appearance: She is well-developed.   HENT:      Head: Normocephalic.   Eyes:      Pupils: Pupils are equal, round, and reactive to light.   Cardiovascular:      Rate and Rhythm: Normal rate and regular rhythm.      Heart sounds: No murmur heard.  Pulmonary:      Effort: Pulmonary effort is normal.      Breath sounds: Normal breath sounds.   Abdominal:      General: Bowel sounds are normal.      Palpations: Abdomen is soft.   Musculoskeletal:        Arms:       Cervical back: Normal range of motion and neck supple.      Comments: Approximate 2cm superficial laceration present to the lateral aspect of the R wrist.  RUE is pink, warm and dry with +PMS.  She has full ROM of the wrist.  Cap refill is less than 2 seconds.  No active bleeding present.   Skin:     General: Skin is warm and dry.   Neurological:      Mental Status: She is alert and oriented to person, place, and time.         Laceration Repair    Date/Time: 12/3/2022 5:58 PM  Performed by: Aliyah Lopez APRN  Authorized by: Aliyah Lopez APRN     Consent:     Consent obtained:  Verbal    Consent given by:  Patient    Risks discussed:  Poor cosmetic result, poor wound healing, tendon damage, vascular damage and infection    Alternatives discussed:  No treatment, delayed treatment and observation  Universal protocol:     Procedure explained and questions  answered to patient or proxy's satisfaction: yes      Patient identity confirmed:  Verbally with patient  Anesthesia:     Anesthesia method:  Local infiltration    Local anesthetic:  Lidocaine 1% w/o epi  Laceration details:     Location:  Shoulder/arm    Shoulder/arm location:  R lower arm    Length (cm):  2  Pre-procedure details:     Preparation:  Patient was prepped and draped in usual sterile fashion  Exploration:     Limited defect created (wound extended): no      Contaminated: no    Treatment:     Area cleansed with:  Chlorhexidine    Amount of cleaning:  Standard    Irrigation solution:  Sterile saline    Irrigation volume:  30    Irrigation method:  Syringe    Debridement:  None    Undermining:  None  Skin repair:     Repair method:  Sutures    Suture size:  4-0    Suture material:  Nylon    Number of sutures:  2  Approximation:     Approximation:  Loose  Repair type:     Repair type:  Simple  Post-procedure details:     Dressing:  Antibiotic ointment and non-adherent dressing    Procedure completion:  Tolerated well, no immediate complications               ED Course  ED Course as of 12/04/22 0905   Sat Dec 03, 2022   1734 Area assessed.  Sutures will be placed.   [KS]   1755 Sutures placed.  She voiced understanding of all instructions.  [KS]      ED Course User Index  [KS] Aliyah Lopez APRN                                           MDM  Number of Diagnoses or Management Options  Laceration of skin of right wrist, initial encounter: minor  Risk of Complications, Morbidity, and/or Mortality  Presenting problems: minimal  Diagnostic procedures: minimal  Management options: minimal    Patient Progress  Patient progress: improved      Final diagnoses:   Laceration of skin of right wrist, initial encounter       ED Disposition  ED Disposition     ED Disposition   Discharge    Condition   Stable    Comment   --             Carlin Amado  [address] [place] [id number] [phone]    Call in  2 days  Routine ED follow up         Medication List      No changes were made to your prescriptions during this visit.          Aliyah Lopez, APRN  12/04/22 0994

## 2022-12-04 NOTE — DISCHARGE INSTRUCTIONS
Drink plenty of fluid.  Tylenol or motrin as needed for pain.  Wash area 2-3 times with antibacterial soap and apply antibiotic ointment.  Sutures out in 7 days.  Follow up with Pcp - call Monday for appointment. Return to ED if condition does not improve or worsens

## 2022-12-04 NOTE — PROGRESS NOTES
"Office New Patient History and Physical:     Referring Provider: Mariaa Alejandre, *    Chief Complaint   Patient presents with   • Skin Lesion       Subjective .     History of present illness:  Laura Morales is a 31 y.o. female who presented to her PCP with a \"dimple on her right buttock.\" She reports pain at that site and that the dimple is getting bigger. She says it has been there for a a few years but is getting bigger. She denies a history of trauma to the area. It is sore and is intermittently painful. She denies drainage and overlying skin changes.     She has a BMI of 18. She is not on blood thinners. She is a current every day vape user with nicotine.     History  Past Medical History:   Diagnosis Date   • ADD (attention deficit disorder)    • Fracture of rib of left side    • Seasonal allergies    • UTI (urinary tract infection)    ,   Past Surgical History:   Procedure Laterality Date   • BREAST SURGERY      AUGMENTATION   • BREAST SURGERY      REDO OF PREVIOUS SURGERY   • CYST REMOVAL     • SEPTOPLASTY, RESECTION INFERIOR TURBINATES Bilateral 03/18/2021    Procedure: septoplasty with turbinate reduction;  Surgeon: Mart Betts Jr., MD;  Location: [facility];  Service: ENT;  Laterality: Bilateral;   ,   Family History   Problem Relation Age of Onset   • Gout Father    • Cancer Maternal Grandmother         breast   • Diabetes Maternal Grandmother    ,   Social History     Tobacco Use   • Smoking status: Former     Packs/day: 0.25     Years: 10.00     Pack years: 2.50     Types: Cigarettes     Quit date: 9/12/2012     Years since quitting: 10.2     Passive exposure: Past   • Smokeless tobacco: Never   Vaping Use   • Vaping Use: Never used   Substance Use Topics   • Alcohol use: Yes     Alcohol/week: 1.0 standard drink     Types: 1 Glasses of wine per week     Comment: occasional   • Drug use: No   , (Not in a hospital admission)   and Allergies:  Acetaminophen-codeine    Current Outpatient " "Medications:   •  amphetamine-dextroamphetamine XR (ADDERALL XR) 25 MG 24 hr capsule, Take 1 capsule by mouth Every Morning for 30 days, Disp: 30 capsule, Rfl: 0  •  baclofen (LIORESAL) 10 MG tablet, Take 10 mg by mouth Daily As Needed., Disp: , Rfl:   •  cloNIDine (CATAPRES) 0.1 MG tablet, Take 0.1 mg by mouth 3 (Three) Times a Day As Needed for High Blood Pressure., Disp: , Rfl:   •  gabapentin (NEURONTIN) 300 MG capsule, 3 (Three) Times a Day As Needed., Disp: , Rfl:   •  hydrOXYzine pamoate (VISTARIL) 50 MG capsule, hydroxyzine pamoate 50 mg capsule, Disp: , Rfl:   •  spironolactone (Aldactone) 25 MG tablet, Take 1 tablet by mouth Daily., Disp: 25 tablet, Rfl: 5  •  valACYclovir (VALTREX) 500 MG tablet, Take 500 mg by mouth Daily., Disp: , Rfl:     Objective     Vital Signs   Ht 157.5 cm (62\")   Wt 45.4 kg (100 lb)   LMP 11/14/2022 (Exact Date)   BMI 18.29 kg/m²      Physical Exam:  General appearance - alert, well appearing, and in no distress  Mental status - alert, oriented to person, place, and time  Eyes - pupils equal and reactive, extraocular eye movements intact  Neck - supple, no significant adenopathy  Chest - no tachypnea, retractions or cyanosis  Heart - normal rate and regular rhythm  Abdomen - soft, nontender, nondistended, no masses or organomegaly  Neurological - alert, oriented, normal speech, no focal findings or movement disorder noted  Skin - 5cm area of skin indentation right buttock     Results Review:    The following data was reviewed by: Lela Bermeo MD on 12/05/2022:    Progress Notes by Mariaa Alejandre APRN (11/28/2022 09:30)    Assessment & Plan       Diagnoses and all orders for this visit:    1. Fat necrosis (Primary)  -     US Soft Tissue; Future    2. Nicotine dependence due to vaping tobacco product      Ms. Morales is a 31 year old female with an increasing indentation in her right buttock that appears consistent with fat necrosis but there is no inciting trauma " that she can think of in the past. Will get US to rule out any underlying cause of indentation. I will call/send her a adRiset message her with the results. Alternate heat and ice and massage. If it is just fat necrosis, I do not recommend surgical intervention as it will likely just increase the fat necrosis.     This is a chronic problem that is worsening. I have reviewed the note from her PCP. I have ordered an US.     BMI is below normal parameters (malnutrition). Recommendations: none (medical contraindication)      Lela Bermeo MD  12/06/22  13:11 CST

## 2022-12-05 ENCOUNTER — OFFICE VISIT (OUTPATIENT)
Dept: SURGERY | Facility: CLINIC | Age: 32
End: 2022-12-05

## 2022-12-05 VITALS — HEIGHT: 62 IN | WEIGHT: 100 LBS | BODY MASS INDEX: 18.4 KG/M2

## 2022-12-05 DIAGNOSIS — F17.290 NICOTINE DEPENDENCE DUE TO VAPING TOBACCO PRODUCT: ICD-10-CM

## 2022-12-05 DIAGNOSIS — M79.89 FAT NECROSIS: Primary | ICD-10-CM

## 2022-12-05 PROCEDURE — 99203 OFFICE O/P NEW LOW 30 MIN: CPT | Performed by: STUDENT IN AN ORGANIZED HEALTH CARE EDUCATION/TRAINING PROGRAM

## 2022-12-05 NOTE — PATIENT INSTRUCTIONS
Smoking Tobacco Information, Adult  Smoking tobacco can be harmful to your health. Tobacco contains a poisonous (toxic), colorless chemical called nicotine. Nicotine is addictive. It changes the brain and can make it hard to stop smoking. Tobacco also has other toxic chemicals that can hurt your body and raise your risk of many cancers.  How can smoking tobacco affect me?  Smoking tobacco puts you at risk for:  Cancer. Smoking is most commonly associated with lung cancer, but can also lead to cancer in other parts of the body.  Chronic obstructive pulmonary disease (COPD). This is a long-term lung condition that makes it hard to breathe. It also gets worse over time.  High blood pressure (hypertension), heart disease, stroke, or heart attack.  Lung infections, such as pneumonia.  Cataracts. This is when the lenses in the eyes become clouded.  Digestive problems. This may include peptic ulcers, heartburn, and gastroesophageal reflux disease (GERD).  Oral health problems, such as gum disease and tooth loss.  Loss of taste and smell.  Smoking can affect your appearance by causing:  Wrinkles.  Yellow or stained teeth, fingers, and fingernails.  Smoking tobacco can also affect your social life, because:  It may be challenging to find places to smoke when away from home. Many workplaces, restaurants, hotels, and public places are tobacco-free.  Smoking is expensive. This is due to the cost of tobacco and the long-term costs of treating health problems from smoking.  Secondhand smoke may affect those around you. Secondhand smoke can cause lung cancer, breathing problems, and heart disease. Children of smokers have a higher risk for:  Sudden infant death syndrome (SIDS).  Ear infections.  Lung infections.  If you currently smoke tobacco, quitting now can help you:  Lead a longer and healthier life.  Look, smell, breathe, and feel better over time.  Save money.  Protect others from the harms of secondhand smoke.  What  actions can I take to prevent health problems?  Quit smoking    Do not start smoking. Quit if you already do.  Make a plan to quit smoking and commit to it. Look for programs to help you and ask your health care provider for recommendations and ideas.  Set a date and write down all the reasons you want to quit.  Let your friends and family know you are quitting so they can help and support you. Consider finding friends who also want to quit. It can be easier to quit with someone else, so that you can support each other.  Talk with your health care provider about using nicotine replacement medicines to help you quit, such as gum, lozenges, patches, sprays, or pills.  Do not replace cigarette smoking with electronic cigarettes, which are commonly called e-cigarettes. The safety of e-cigarettes is not known, and some may contain harmful chemicals.  If you try to quit but return to smoking, stay positive. It is common to slip up when you first quit, so take it one day at a time.  Be prepared for cravings. When you feel the urge to smoke, chew gum or suck on hard candy.    Lifestyle  Stay busy and take care of your body.  Drink enough fluid to keep your urine pale yellow.  Get plenty of exercise and eat a healthy diet. This can help prevent weight gain after quitting.  Monitor your eating habits. Quitting smoking can cause you to have a larger appetite than when you smoke.  Find ways to relax. Go out with friends or family to a movie or a restaurant where people do not smoke.  Ask your health care provider about having regular tests (screenings) to check for cancer. This may include blood tests, imaging tests, and other tests.  Find ways to manage your stress, such as meditation, yoga, or exercise.  Where to find support  To get support to quit smoking, consider:  Asking your health care provider for more information and resources.  Taking classes to learn more about quitting smoking.  Looking for local organizations  that offer resources about quitting smoking.  Joining a support group for people who want to quit smoking in your local community.  Calling the smokefree.gov counselor helpline: 1-800-Quit-Now (1-528.892.2442)  Where to find more information  You may find more information about quitting smoking from:  HelpGuide.org: www.helpguide.org  Smokefree.gov: smokefree.gov  American Lung Association: www.lung.org  Contact a health care provider if you:  Have problems breathing.  Notice that your lips, nose, or fingers turn blue.  Have chest pain.  Are coughing up blood.  Feel faint or you pass out.  Have other health changes that cause you to worry.  Summary  Smoking tobacco can negatively affect your health, the health of those around you, your finances, and your social life.  Do not start smoking. Quit if you already do. If you need help quitting, ask your health care provider.  Think about joining a support group for people who want to quit smoking in your local community. There are many effective programs that will help you to quit this behavior.  This information is not intended to replace advice given to you by your health care provider. Make sure you discuss any questions you have with your health care provider.  Document Revised: 09/11/2020 Document Reviewed: 01/02/2018  Elsevier Patient Education © 2021 [employer]

## 2022-12-15 ENCOUNTER — HOSPITAL ENCOUNTER (OUTPATIENT)
Dept: ULTRASOUND IMAGING | Facility: HOSPITAL | Age: 32
Discharge: HOME | End: 2022-12-15
Admitting: STUDENT IN AN ORGANIZED HEALTH CARE EDUCATION/TRAINING PROGRAM

## 2022-12-15 DIAGNOSIS — M79.89 FAT NECROSIS: ICD-10-CM

## 2022-12-15 PROCEDURE — 76999 ECHO EXAMINATION PROCEDURE: CPT

## 2023-02-06 DIAGNOSIS — F90.2 ATTENTION DEFICIT HYPERACTIVITY DISORDER (ADHD), COMBINED TYPE: ICD-10-CM

## 2023-02-06 RX ORDER — DEXTROAMPHETAMINE SULFATE, DEXTROAMPHETAMINE SACCHARATE, AMPHETAMINE SULFATE AND AMPHETAMINE ASPARTATE 6.25; 6.25; 6.25; 6.25 MG/1; MG/1; MG/1; MG/1
CAPSULE, EXTENDED RELEASE ORAL
Qty: 30 CAPSULE | Refills: 0 | Status: SHIPPED | OUTPATIENT
Start: 2023-02-06 | End: 2023-03-28

## 2023-02-06 NOTE — TELEPHONE ENCOUNTER
Caller: Laura Morales    Relationship to patient: Self    Best call back number: [phone]    Patient is needing:pt said that she is completely out. She said that pharmacy claimed that they have been requesting for over a week but had not gotten any response.

## 2023-03-06 DIAGNOSIS — F90.2 ATTENTION DEFICIT HYPERACTIVITY DISORDER (ADHD), COMBINED TYPE: ICD-10-CM

## 2023-03-07 RX ORDER — DEXTROAMPHETAMINE SULFATE, DEXTROAMPHETAMINE SACCHARATE, AMPHETAMINE SULFATE AND AMPHETAMINE ASPARTATE 6.25; 6.25; 6.25; 6.25 MG/1; MG/1; MG/1; MG/1
CAPSULE, EXTENDED RELEASE ORAL
Qty: 30 CAPSULE | Refills: 0 | OUTPATIENT
Start: 2023-03-07

## 2023-03-11 DIAGNOSIS — F90.2 ATTENTION DEFICIT HYPERACTIVITY DISORDER (ADHD), COMBINED TYPE: ICD-10-CM

## 2023-03-13 RX ORDER — DEXTROAMPHETAMINE SULFATE, DEXTROAMPHETAMINE SACCHARATE, AMPHETAMINE SULFATE AND AMPHETAMINE ASPARTATE 6.25; 6.25; 6.25; 6.25 MG/1; MG/1; MG/1; MG/1
CAPSULE, EXTENDED RELEASE ORAL
Qty: 30 CAPSULE | Refills: 0 | OUTPATIENT
Start: 2023-03-13

## 2023-03-15 ENCOUNTER — TELEMEDICINE (OUTPATIENT)
Dept: FAMILY MEDICINE CLINIC | Facility: CLINIC | Age: 33
End: 2023-03-15
Payer: COMMERCIAL

## 2023-03-15 VITALS — HEIGHT: 62 IN | WEIGHT: 107 LBS | BODY MASS INDEX: 19.69 KG/M2

## 2023-03-15 DIAGNOSIS — G47.09 OTHER INSOMNIA: ICD-10-CM

## 2023-03-15 DIAGNOSIS — F90.2 ATTENTION DEFICIT HYPERACTIVITY DISORDER (ADHD), COMBINED TYPE: Primary | ICD-10-CM

## 2023-03-15 PROCEDURE — 1159F MED LIST DOCD IN RCRD: CPT | Performed by: NURSE PRACTITIONER

## 2023-03-15 PROCEDURE — 1160F RVW MEDS BY RX/DR IN RCRD: CPT | Performed by: NURSE PRACTITIONER

## 2023-03-15 PROCEDURE — 99214 OFFICE O/P EST MOD 30 MIN: CPT | Performed by: NURSE PRACTITIONER

## 2023-03-15 RX ORDER — DEXTROAMPHETAMINE SACCHARATE, AMPHETAMINE ASPARTATE MONOHYDRATE, DEXTROAMPHETAMINE SULFATE AND AMPHETAMINE SULFATE 7.5; 7.5; 7.5; 7.5 MG/1; MG/1; MG/1; MG/1
30 CAPSULE, EXTENDED RELEASE ORAL EVERY MORNING
Qty: 30 CAPSULE | Refills: 0 | Status: SHIPPED | OUTPATIENT
Start: 2023-03-15

## 2023-03-15 RX ORDER — CLONIDINE HYDROCHLORIDE 0.1 MG/1
0.1 TABLET ORAL 3 TIMES DAILY PRN
Qty: 30 TABLET | Refills: 2 | Status: SHIPPED | OUTPATIENT
Start: 2023-03-15

## 2023-03-15 NOTE — PROGRESS NOTES
"This was an audio and video enabled telemedicine encounter. Patient verbally consented to visit. Patient was located at [facility] and I was located at [facility]  location.     Chief Complaint  ADHD (3 month follow up)    Subjective    History of Present Illness      Patient presents to [facility] for   History of Present Illness  Patient being seen for an ADHD follow-up.  She is requesting to increase her dose at this time.       Review of Systems   Constitutional: Negative.    HENT: Negative.    Eyes: Negative.    Respiratory: Negative.    Cardiovascular: Negative.    Gastrointestinal: Negative.    Endocrine: Negative.    Genitourinary: Negative.    Musculoskeletal: Negative.    Skin: Negative.    Allergic/Immunologic: Negative.    Neurological: Negative.    Hematological: Negative.    Psychiatric/Behavioral: Negative.        I have reviewed and agree with the HPI and [facility] information as above.  Mariaa Medina, APRN     Objective   Vital Signs:   Ht 157.5 cm (62\")   Wt 48.5 kg (107 lb)   BMI 19.57 kg/m²     BMI is below normal parameters (malnutrition). Recommendations: none (medical contraindication)      Physical Exam  Vitals and nursing note reviewed.   Constitutional:       Appearance: Normal appearance. She is well-developed.   HENT:      Head: Normocephalic and atraumatic.      Mouth/Throat:      Lips: Pink. No lesions.   Eyes:      General: Lids are normal. Vision grossly intact.      Conjunctiva/sclera: Conjunctivae normal.      Right eye: Right conjunctiva is not injected.      Left eye: Left conjunctiva is not injected.   Pulmonary:      Effort: Pulmonary effort is normal.   Musculoskeletal:         General: Normal range of motion.      Cervical back: Full passive range of motion without pain, normal range of motion and neck supple.   Skin:     General: Skin is dry.   Neurological:      Mental Status: She is alert and oriented to person, place, and time.      " Motor: Motor function is intact.   Psychiatric:         Mood and Affect: Mood and affect normal.         Judgment: Judgment normal.          HARSHAL-7:      PHQ-2 Depression Screening  Little interest or pleasure in doing things?     Feeling down, depressed, or hopeless?     PHQ-2 Total Score       PHQ-9 Depression Screening  Little interest or pleasure in doing things?     Feeling down, depressed, or hopeless?     Trouble falling or staying asleep, or sleeping too much?     Feeling tired or having little energy?     Poor appetite or overeating?     Feeling bad about yourself - or that you are a failure or have let yourself or your family down?     Trouble concentrating on things, such as reading the newspaper or watching television?     Moving or speaking so slowly that other people could have noticed? Or the opposite - being so fidgety or restless that you have been moving around a lot more than usual?     Thoughts that you would be better off dead, or of hurting yourself in some way?     PHQ-9 Total Score     If you checked off any problems, how difficult have these problems made it for you to do your work, take care of things at home, or get along with other people?        Result Review  Data Reviewed:   The following data was reviewed by: NAEEM Hensley on 03/15/2023:    Urine Drug Screen - Urine, Clean Catch (11/28/2022 12:33)           Assessment and Plan      Diagnoses and all orders for this visit:    1. Attention deficit hyperactivity disorder (ADHD), combined type (Primary)    2. Other insomnia  -     cloNIDine (CATAPRES) 0.1 MG tablet; Take 1 tablet by mouth 3 (Three) Times a Day As Needed for High Blood Pressure.  Dispense: 30 tablet; Refill: 2        Patient being seen through telehealth today for an ADHD follow-up.  Patient is requesting to increase the Adderall XR dose.  She has been on 30 mg in the past and did well.  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  She is also requesting refills of clonidine.    Plan:    1.  Increase to Adderall XR 30 mg.  Reji is clean.  UDS is up-to-date and appropriate.  Watch weight closely.  Also watch for increased anxiety or irritability.  2.  Refill sent of clonidine.  3.  Follow-up 1 month.      ADHD Follow up:    PDMP reviewed and is clean. ADRS (Adult ADHD Assessment Form) reviewed in detail, scanned in chart, and has been reviewed at time of appointment.  All questions, including medication and side effects, were discussed in detail at time of patient's visit. Patient will change medication dose which was discussed at today's visit. Patient is to return in 1 month(s).    Last Urine Toxicity     LAST URINE TOXICITY RESULTS Latest Ref Rng & Units 11/28/2022 4/5/2021    AMPHETAMINES SCREEN, URINE Negative Positive(A) Positive(A)    BARBITURATES SCREEN Negative Negative Negative    BENZODIAZEPINE SCREEN, URINE Negative Negative Negative    BUPRENORPHINEUR Negative Negative Negative    COCAINE SCREEN, URINE Negative Negative Negative    METHADONE SCREEN, URINE Negative Negative Negative    METHAMPHETAMINEUR Negative Negative Negative           Urine Drug Screen Results: UDS RESULTS: Current results appropriate    Follow Up   Return in about 1 month (around 4/15/2023) for Recheck.  Patient was given instructions and counseling regarding her condition or for health maintenance advice. Please see specific information pulled into the AVS if appropriate.

## 2023-03-28 ENCOUNTER — HOSPITAL ENCOUNTER (OUTPATIENT)
Dept: GENERAL RADIOLOGY | Facility: HOSPITAL | Age: 33
Discharge: HOME | End: 2023-03-28
Payer: COMMERCIAL

## 2023-03-28 PROCEDURE — 72040 X-RAY EXAM NECK SPINE 2-3 VW: CPT

## 2023-03-28 PROCEDURE — 73030 X-RAY EXAM OF SHOULDER: CPT

## 2023-04-13 ENCOUNTER — TELEPHONE (OUTPATIENT)
Dept: FAMILY MEDICINE CLINIC | Facility: CLINIC | Age: 33
End: 2023-04-13
Payer: COMMERCIAL

## 2023-04-13 NOTE — TELEPHONE ENCOUNTER
Laura came to the office at 4:26 on Thursday, April 13 to be seen as a walk-in and follow up from her visit one month ago. She had been told on Wednesday that she could be seen as long as she checked in by 4:30. Laura was informed of the new walk-in policy.    Unable to provide courtesy refill. Laura declined scheduling an appointment due to work scheduling conflicts.    She reports no weight change and states the increased dosage has been beneficial.  However, she stated that she would like to return to her previous medication dosage if it means she will be able to get a refill.

## 2023-04-17 ENCOUNTER — OFFICE VISIT (OUTPATIENT)
Dept: FAMILY MEDICINE CLINIC | Facility: CLINIC | Age: 33
End: 2023-04-17
Payer: COMMERCIAL

## 2023-04-17 VITALS
HEIGHT: 62 IN | RESPIRATION RATE: 20 BRPM | DIASTOLIC BLOOD PRESSURE: 82 MMHG | BODY MASS INDEX: 19.69 KG/M2 | SYSTOLIC BLOOD PRESSURE: 116 MMHG | HEART RATE: 83 BPM | WEIGHT: 107 LBS

## 2023-04-17 DIAGNOSIS — F90.2 ATTENTION DEFICIT HYPERACTIVITY DISORDER (ADHD), COMBINED TYPE: Primary | ICD-10-CM

## 2023-04-17 DIAGNOSIS — R11.0 NAUSEA: ICD-10-CM

## 2023-04-17 DIAGNOSIS — G47.09 OTHER INSOMNIA: ICD-10-CM

## 2023-04-17 PROCEDURE — 1159F MED LIST DOCD IN RCRD: CPT

## 2023-04-17 PROCEDURE — 1160F RVW MEDS BY RX/DR IN RCRD: CPT

## 2023-04-17 PROCEDURE — 99214 OFFICE O/P EST MOD 30 MIN: CPT

## 2023-04-17 RX ORDER — ONDANSETRON 8 MG/1
8 TABLET, ORALLY DISINTEGRATING ORAL EVERY 8 HOURS PRN
Qty: 90 TABLET | Refills: 0 | Status: SHIPPED | OUTPATIENT
Start: 2023-04-17

## 2023-04-17 RX ORDER — DEXTROAMPHETAMINE SACCHARATE, AMPHETAMINE ASPARTATE MONOHYDRATE, DEXTROAMPHETAMINE SULFATE AND AMPHETAMINE SULFATE 7.5; 7.5; 7.5; 7.5 MG/1; MG/1; MG/1; MG/1
30 CAPSULE, EXTENDED RELEASE ORAL EVERY MORNING
Qty: 30 CAPSULE | Refills: 0 | Status: SHIPPED | OUTPATIENT
Start: 2023-05-15 | End: 2023-06-14

## 2023-04-17 RX ORDER — CLONIDINE HYDROCHLORIDE 0.1 MG/1
0.1 TABLET ORAL 3 TIMES DAILY PRN
Qty: 30 TABLET | Refills: 2 | Status: SHIPPED | OUTPATIENT
Start: 2023-04-17

## 2023-04-17 RX ORDER — DEXTROAMPHETAMINE SACCHARATE, AMPHETAMINE ASPARTATE MONOHYDRATE, DEXTROAMPHETAMINE SULFATE AND AMPHETAMINE SULFATE 7.5; 7.5; 7.5; 7.5 MG/1; MG/1; MG/1; MG/1
30 CAPSULE, EXTENDED RELEASE ORAL EVERY MORNING
Qty: 30 CAPSULE | Refills: 0 | Status: SHIPPED | OUTPATIENT
Start: 2023-04-17 | End: 2023-05-17

## 2023-04-17 NOTE — PATIENT INSTRUCTIONS

## 2023-04-17 NOTE — PROGRESS NOTES
"Chief Complaint  ADD, Med Refill, and Nausea    Subjective    History of Present Illness      Patient presents to [facility] for   History of Present Illness  States she is doing well on her new meds. She was here for weight check. She has had the stomach virus and is requesting some zofran for nausea.        Review of Systems   Gastrointestinal: Positive for nausea.   All other systems reviewed and are negative.      I have reviewed and agree with the HPI and ROS information as above.  Rosana Taylor, APRN     Objective   Vital Signs:   /82   Pulse 83   Resp 20   Ht 157.5 cm (62\")   Wt 48.5 kg (107 lb)   BMI 19.57 kg/m²     BMI is within normal parameters. No other follow-up for BMI required.      Physical Exam  Constitutional:       Appearance: Normal appearance. She is well-developed.   HENT:      Head: Normocephalic and atraumatic.      Right Ear: External ear normal.      Left Ear: External ear normal.      Nose: Nose normal. No nasal tenderness or congestion.      Mouth/Throat:      Lips: Pink. No lesions.      Mouth: Mucous membranes are moist. No oral lesions.      Dentition: Normal dentition.      Pharynx: Oropharynx is clear. No pharyngeal swelling, oropharyngeal exudate or posterior oropharyngeal erythema.   Eyes:      General: Lids are normal. Vision grossly intact. No scleral icterus.        Right eye: No discharge.         Left eye: No discharge.      Extraocular Movements: Extraocular movements intact.      Conjunctiva/sclera: Conjunctivae normal.      Right eye: Right conjunctiva is not injected.      Left eye: Left conjunctiva is not injected.      Pupils: Pupils are equal, round, and reactive to light.   Cardiovascular:      Rate and Rhythm: Normal rate and regular rhythm.      Heart sounds: Normal heart sounds. No murmur heard.    No gallop.   Pulmonary:      Effort: Pulmonary effort is normal.      Breath sounds: Normal breath sounds and air entry. No " wheezing, rhonchi or rales.   Musculoskeletal:         General: No tenderness or deformity. Normal range of motion.      Cervical back: Full passive range of motion without pain, normal range of motion and neck supple.      Right lower leg: No edema.      Left lower leg: No edema.   Skin:     General: Skin is warm and dry.      Coloration: Skin is not jaundiced.      Findings: No rash.   Neurological:      Mental Status: She is alert and oriented to person, place, and time.      Sensory: Sensation is intact.      Motor: Motor function is intact.      Coordination: Coordination is intact.      Gait: Gait is intact.   Psychiatric:         Attention and Perception: Attention normal.         Mood and Affect: Mood and affect normal.         Behavior: Behavior is not hyperactive. Behavior is cooperative.         Thought Content: Thought content normal.         Judgment: Judgment normal.          HARSHAL-7: Over the last two weeks, how often have you been bothered by the following problems?  If you checked any problems, how difficult have these problems made it for you to do your work, take care of things at home, or get along with other people: Not difficult at all    PHQ-2 Depression Screening  Little interest or pleasure in doing things? 0-->not at all   Feeling down, depressed, or hopeless? 0-->not at all   PHQ-2 Total Score 0     PHQ-9 Depression Screening  Little interest or pleasure in doing things? 0-->not at all   Feeling down, depressed, or hopeless? 0-->not at all   Trouble falling or staying asleep, or sleeping too much?     Feeling tired or having little energy?     Poor appetite or overeating?     Feeling bad about yourself - or that you are a failure or have let yourself or your family down?     Trouble concentrating on things, such as reading the newspaper or watching television?     Moving or speaking so slowly that other people could have noticed? Or the opposite - being so fidgety or restless that you have  been moving around a lot more than usual?     Thoughts that you would be better off dead, or of hurting yourself in some way?     PHQ-9 Total Score 0   If you checked off any problems, how difficult have these problems made it for you to do your work, take care of things at home, or get along with other people?        Result Review  Data Reviewed:                   Assessment and Plan      Diagnoses and all orders for this visit:    1. Attention deficit hyperactivity disorder (ADHD), combined type (Primary)    2. Other insomnia  -     cloNIDine (CATAPRES) 0.1 MG tablet; Take 1 tablet by mouth 3 (Three) Times a Day As Needed for High Blood Pressure.  Dispense: 30 tablet; Refill: 2    3. Nausea  -     ondansetron ODT (ZOFRAN-ODT) 8 MG disintegrating tablet; Place 1 tablet on the tongue Every 8 (Eight) Hours As Needed for Nausea or Vomiting.  Dispense: 90 tablet; Refill: 0    Patient is seen today for ADHD, insomnia and nausea.  Patient is doing well on current medication regimens and wishes to continue same.  Patient states she has had the stomach virus is requesting Zofran at this time.  Patient overall is doing well.    Plan:  1.  ADHD stable well-controlled with Adderall XR 30 mg.  UDS is up-to-date Reji pending.  2.  Insomnia stable with clonidine 0.1.  3.  Zofran for nausea.  Brat diet encouraged    ADHD Follow up:    PDMP reviewed and pending. ADRS (Adult ADHD Assessment Form) reviewed in detail, scanned in chart, and has been reviewed at time of appointment.  All questions, including medication and side effects, were discussed in detail at time of patient's visit. Patient will continue same medication which was discussed at today's visit. Patient is to return in 3 month(s).    Last Urine Toxicity         Latest Ref Rng & Units 11/28/2022 4/5/2021   LAST URINE TOXICITY RESULTS   Amphetamine, Urine Qual Negative Positive   Positive     Barbiturates Screen, Urine Negative Negative   Negative     Benzodiazepine  Screen, Urine Negative Negative   Negative     Buprenorphine, Screen, Urine Negative Negative   Negative     Cocaine Screen, Urine Negative Negative   Negative     Methadone Screen , Urine Negative Negative   Negative     Methamphetamine, Ur Negative Negative   Negative           Multiple values from one day are sorted in reverse-chronological order              Urine Drug Screen Results: UDS RESULTS: Current results appropriate        Follow Up   Return in about 3 months (around 7/17/2023) for ADHD.  Patient was given instructions and counseling regarding her condition or for health maintenance advice. Please see specific information pulled into the AVS if appropriate.

## 2023-05-24 ENCOUNTER — TELEPHONE (OUTPATIENT)
Dept: FAMILY MEDICINE CLINIC | Facility: CLINIC | Age: 33
End: 2023-05-24
Payer: COMMERCIAL

## 2023-05-24 DIAGNOSIS — G47.09 OTHER INSOMNIA: ICD-10-CM

## 2023-05-24 RX ORDER — CLONIDINE HYDROCHLORIDE 0.1 MG/1
0.1 TABLET ORAL 3 TIMES DAILY PRN
Qty: 90 TABLET | Refills: 0 | Status: SHIPPED | OUTPATIENT
Start: 2023-05-24
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
## 2023-06-12 DIAGNOSIS — F90.2 ATTENTION DEFICIT HYPERACTIVITY DISORDER (ADHD), COMBINED TYPE: ICD-10-CM

## 2023-06-12 RX ORDER — DEXTROAMPHETAMINE SULFATE, DEXTROAMPHETAMINE SACCHARATE, AMPHETAMINE SULFATE AND AMPHETAMINE ASPARTATE 7.5; 7.5; 7.5; 7.5 MG/1; MG/1; MG/1; MG/1
CAPSULE, EXTENDED RELEASE ORAL
Qty: 30 CAPSULE | Refills: 0 | Status: SHIPPED | OUTPATIENT
Start: 2023-06-12

## 2023-06-17 ENCOUNTER — HOSPITAL ENCOUNTER (EMERGENCY)
Facility: HOSPITAL | Age: 33
Discharge: HOME | End: 2023-06-17
Attending: EMERGENCY MEDICINE
Payer: COMMERCIAL

## 2023-06-17 ENCOUNTER — NURSE TRIAGE (OUTPATIENT)
Dept: CALL CENTER | Facility: HOSPITAL | Age: 33
End: 2023-06-17
Payer: COMMERCIAL

## 2023-06-17 VITALS
HEART RATE: 87 BPM | DIASTOLIC BLOOD PRESSURE: 63 MMHG | TEMPERATURE: 98.2 F | OXYGEN SATURATION: 100 % | HEIGHT: 62 IN | WEIGHT: 107 LBS | SYSTOLIC BLOOD PRESSURE: 100 MMHG | RESPIRATION RATE: 16 BRPM | BODY MASS INDEX: 19.69 KG/M2

## 2023-06-17 DIAGNOSIS — H60.501 ACUTE OTITIS EXTERNA OF RIGHT EAR, UNSPECIFIED TYPE: Primary | ICD-10-CM

## 2023-06-17 RX ORDER — CIPROFLOXACIN AND DEXAMETHASONE 3; 1 MG/ML; MG/ML
4 SUSPENSION/ DROPS AURICULAR (OTIC) 2 TIMES DAILY
Qty: 7.5 ML | Refills: 0 | Status: SHIPPED | OUTPATIENT
Start: 2023-06-17 | End: 2023-06-22

## 2023-06-17 NOTE — TELEPHONE ENCOUNTER
"Guidelines followed, protocols reviewed. Caller to go to walk in clinic for evaluation of Right ear pain and drainage.    Reason for Disposition   Ear pain    Additional Information   Negative: [1] Clear or white discharge AND [2] swimmer's ear suspected (e.g., recent swimming, ear pain occurs or increases when the earlobe is pulled up and down)   Negative: [1] Bloody ear discharge AND [2] after an ear injury   Negative: Earwax is main concern   Negative: [1] Bloody or clear ear discharge AND [2] after a head or face injury   Negative: [1] Unexplained bleeding AND [2] lasts > 10 minutes or large amount (Exception: If a few drops of blood, continue with triage.)   Negative: Fever > 103 F (39.4 C)   Negative: Patient sounds very sick or weak to the triager   Negative: Diabetes mellitus or weak immune system (e.g., HIV positive, cancer chemo, splenectomy, organ transplant, chronic steroids)    Answer Assessment - Initial Assessment Questions  1. LOCATION: \"Which ear is involved?\"       Right ear.  2. COLOR: \"What is the color of the discharge?\"       Bloody yesterday, now yellow dry crusted  3. CONSISTENCY: \"How runny is the discharge? Could it be water?\"       Not wate  4. ONSET: \"When did you first notice the discharge?\"      Yesterday  5. PAIN: \"Is there any earache?\" \"How bad is it?\"  (Scale 1-10; or mild, moderate, severe)      Rates 7/10  6. OBJECTS: \"Have you put anything in your ear?\" (e.g., Q-tip, other object)       Just QTips  7. OTHER SYMPTOMS: \"Do you have any other symptoms?\" (e.g., headache, fever, dizziness, vomiting, runny nose)      Headache x 4 days  8. PREGNANCY: \"Is there any chance you are pregnant?\" \"When was your last menstrual period?\"      N/A    Protocols used: Ear - Discharge-ADULT-AH    "

## 2023-06-17 NOTE — ED PROVIDER NOTES
Subjective   History of Present Illness  Patient with a right-sided earache.  Came to the ED for evaluation.  Patient has been cleaning her ear with a Q-tip noticed some blood    Earache  Location:  Right  Behind ear:  No abnormality  Quality:  Aching  Severity:  Severe  Onset quality:  Sudden  Duration:  1 day  Timing:  Constant  Chronicity:  New  Context: not direct blow, not elevation change and not loud noise    Relieved by:  Nothing  Worsened by:  Nothing  Ineffective treatments:  None tried  Associated symptoms: no abdominal pain, no congestion, no cough, no headaches, no hearing loss, no neck pain, no sore throat and no tinnitus    Risk factors: no recent travel and no chronic ear infection      Review of Systems   Constitutional: Negative.    HENT:  Positive for ear pain. Negative for congestion, hearing loss, sore throat and tinnitus.    Eyes: Negative.    Respiratory: Negative.  Negative for cough.    Cardiovascular: Negative.    Gastrointestinal: Negative.  Negative for abdominal pain.   Musculoskeletal: Negative.  Negative for back pain and neck pain.   Skin: Negative.    Neurological: Negative.  Negative for headaches.   All other systems reviewed and are negative.    Past Medical History:   Diagnosis Date    ADD (attention deficit disorder)     Fracture of rib of left side     Seasonal allergies     UTI (urinary tract infection)        Allergies   Allergen Reactions    Acetaminophen-Codeine Hives    Codeine Rash       Past Surgical History:   Procedure Laterality Date    BREAST SURGERY      AUGMENTATION    BREAST SURGERY      REDO OF PREVIOUS SURGERY    CYST REMOVAL      SEPTOPLASTY, RESECTION INFERIOR TURBINATES Bilateral 03/18/2021    Procedure: septoplasty with turbinate reduction;  Surgeon: Mart Betts Jr., MD;  Location: [facility] OR;  Service: ENT;  Laterality: Bilateral;       Family History   Problem Relation Age of Onset    Gout Father     Cancer Maternal Grandmother         breast     Diabetes Maternal Grandmother        Social History     Socioeconomic History    Marital status: Single   Tobacco Use    Smoking status: Former     Packs/day: 0.25     Years: 10.00     Pack years: 2.50     Types: Cigarettes     Quit date: 9/12/2012     Years since quitting: 10.7     Passive exposure: Past    Smokeless tobacco: Never   Vaping Use    Vaping Use: Never used   Substance and Sexual Activity    Alcohol use: Yes     Alcohol/week: 1.0 standard drink     Types: 1 Glasses of wine per week     Comment: occasional    Drug use: No    Sexual activity: Defer           Objective   Physical Exam  Vitals and nursing note reviewed. Exam conducted with a chaperone present.   Constitutional:       General: She is awake.      Appearance: Normal appearance. She is well-developed. She is not ill-appearing.   HENT:      Head: Normocephalic.      Right Ear: Tympanic membrane and ear canal normal. Tenderness present. No drainage or swelling. No foreign body. No mastoid tenderness. Tympanic membrane is not perforated.      Left Ear: Tympanic membrane and ear canal normal. No drainage, swelling or tenderness. No foreign body. No mastoid tenderness. Tympanic membrane is not perforated.   Eyes:      General: Lids are normal.      Conjunctiva/sclera: Conjunctivae normal.      Pupils: Pupils are equal, round, and reactive to light.   Cardiovascular:      Rate and Rhythm: Normal rate.      Chest Wall: PMI is not displaced.      Pulses: Normal pulses.   Pulmonary:      Effort: Pulmonary effort is normal.      Breath sounds: No decreased breath sounds.   Musculoskeletal:      Cervical back: Full passive range of motion without pain and normal range of motion.   Skin:     General: Skin is warm.      Capillary Refill: Capillary refill takes less than 2 seconds.   Neurological:      General: No focal deficit present.      Mental Status: She is alert.      Motor: Motor function is intact.      Deep Tendon Reflexes: Reflexes are normal and  symmetric.   Psychiatric:         Behavior: Behavior is cooperative.       Procedures           ED Course                                           Medical Decision Making  Ear pain no foreign bodies    Risk  Risk Details: This patient presented with ear pain.  Presentation not consistent with other acute emergent cause of ear pain at this time.  No tinnitus on exam.  No evidence of ruptured TM on exam.  No duke sign or suspicion for fracture.  No evidence of shingles on exam.  Low suspicion for mastoiditis as there is no severe mastoid tenderness.  There is no bulging ear there is no fever and no history of any recent trauma.  Low suspicion for perichondritis as exam is not consistent with perichondritis.  Low suspicion for malignant otitis externa as exam is inconsistent with malignant otitis externa.  There is low suspicion for otic zoster as there is no evidence of shingles or cranial nerve V/VII involvement.  Low suspicion for temporal arteritis as there is no bulging temporal artery and the pain is not localized to temporal area.  No gait disturbance        Final diagnoses:   Acute otitis externa of right ear, unspecified type       ED Disposition  ED Disposition       None            No follow-up provider specified.       Medication List        New Prescriptions      ciprofloxacin-dexamethasone 0.3-0.1 % otic suspension  Commonly known as: CIPRODEX  Administer 4 drops to the right ear 2 (Two) Times a Day for 5 days.               Where to Get Your Medications        These medications were sent to [facility] - [place], [place] - [address] - [phone]  - [phone] FX  [address], [place] [address]      Phone: [phone]   ciprofloxacin-dexamethasone 0.3-0.1 % otic suspension            Kamaljit Sanchez MD  06/17/23 2822

## 2023-06-17 NOTE — ED NOTES
Patient has no active bleeding/drainage visible during assessment. However, she does have some scant bleeding noted inside ear canal upon further assessment. Reports that she noticed some green drainage this AM from right ear, and has noted excess secretions that she is swallowing. Mild right neck pain.

## 2023-07-20 ENCOUNTER — PRE-ADMISSION TESTING (OUTPATIENT)
Dept: PREADMISSION TESTING | Facility: HOSPITAL | Age: 33
End: 2023-07-20
Payer: COMMERCIAL

## 2023-07-20 VITALS
DIASTOLIC BLOOD PRESSURE: 63 MMHG | HEART RATE: 81 BPM | BODY MASS INDEX: 18.98 KG/M2 | WEIGHT: 107.14 LBS | OXYGEN SATURATION: 98 % | SYSTOLIC BLOOD PRESSURE: 93 MMHG | HEIGHT: 63 IN | RESPIRATION RATE: 16 BRPM

## 2023-07-20 DIAGNOSIS — N87.1 DYSPLASIA OF CERVIX, HIGH GRADE CIN 2: ICD-10-CM

## 2023-07-20 LAB
ANION GAP SERPL CALCULATED.3IONS-SCNC: 10 MMOL/L (ref 5–15)
BACTERIA UR QL AUTO: ABNORMAL /HPF
BASOPHILS # BLD AUTO: 0.06 10*3/MM3 (ref 0–0.2)
BASOPHILS NFR BLD AUTO: 1.1 % (ref 0–1.5)
BILIRUB UR QL STRIP: NEGATIVE
BUN SERPL-MCNC: 10 MG/DL (ref 6–20)
BUN/CREAT SERPL: 14.9 (ref 7–25)
CALCIUM SPEC-SCNC: 9 MG/DL (ref 8.6–10.5)
CHLORIDE SERPL-SCNC: 105 MMOL/L (ref 98–107)
CLARITY UR: ABNORMAL
CO2 SERPL-SCNC: 26 MMOL/L (ref 22–29)
COLOR UR: ABNORMAL
CREAT SERPL-MCNC: 0.67 MG/DL (ref 0.57–1)
DEPRECATED RDW RBC AUTO: 43.8 FL (ref 37–54)
EGFRCR SERPLBLD CKD-EPI 2021: 119.3 ML/MIN/1.73
EOSINOPHIL # BLD AUTO: 0.1 10*3/MM3 (ref 0–0.4)
EOSINOPHIL NFR BLD AUTO: 1.9 % (ref 0.3–6.2)
ERYTHROCYTE [DISTWIDTH] IN BLOOD BY AUTOMATED COUNT: 12.1 % (ref 12.3–15.4)
GLUCOSE SERPL-MCNC: 84 MG/DL (ref 65–99)
GLUCOSE UR STRIP-MCNC: NEGATIVE MG/DL
HCG INTACT+B SERPL-ACNC: 0.2 MIU/ML
HCT VFR BLD AUTO: 42.2 % (ref 34–46.6)
HGB BLD-MCNC: 13 G/DL (ref 12–15.9)
HGB UR QL STRIP.AUTO: NEGATIVE
HYALINE CASTS UR QL AUTO: ABNORMAL /LPF
IMM GRANULOCYTES # BLD AUTO: 0.02 10*3/MM3 (ref 0–0.05)
IMM GRANULOCYTES NFR BLD AUTO: 0.4 % (ref 0–0.5)
KETONES UR QL STRIP: NEGATIVE
LEUKOCYTE ESTERASE UR QL STRIP.AUTO: ABNORMAL
LYMPHOCYTES # BLD AUTO: 1.63 10*3/MM3 (ref 0.7–3.1)
LYMPHOCYTES NFR BLD AUTO: 30.5 % (ref 19.6–45.3)
MCH RBC QN AUTO: 30.1 PG (ref 26.6–33)
MCHC RBC AUTO-ENTMCNC: 30.8 G/DL (ref 31.5–35.7)
MCV RBC AUTO: 97.7 FL (ref 79–97)
MONOCYTES # BLD AUTO: 0.31 10*3/MM3 (ref 0.1–0.9)
MONOCYTES NFR BLD AUTO: 5.8 % (ref 5–12)
NEUTROPHILS NFR BLD AUTO: 3.22 10*3/MM3 (ref 1.7–7)
NEUTROPHILS NFR BLD AUTO: 60.3 % (ref 42.7–76)
NITRITE UR QL STRIP: NEGATIVE
NRBC BLD AUTO-RTO: 0 /100 WBC (ref 0–0.2)
PH UR STRIP.AUTO: 8.5 [PH] (ref 5–8)
PLATELET # BLD AUTO: 228 10*3/MM3 (ref 140–450)
PMV BLD AUTO: 11.3 FL (ref 6–12)
POTASSIUM SERPL-SCNC: 4 MMOL/L (ref 3.5–5.2)
PROT UR QL STRIP: ABNORMAL
RBC # BLD AUTO: 4.32 10*6/MM3 (ref 3.77–5.28)
RBC # UR STRIP: ABNORMAL /HPF
REF LAB TEST METHOD: ABNORMAL
SODIUM SERPL-SCNC: 141 MMOL/L (ref 136–145)
SP GR UR STRIP: 1.03 (ref 1–1.03)
SQUAMOUS #/AREA URNS HPF: ABNORMAL /HPF
UROBILINOGEN UR QL STRIP: ABNORMAL
WBC # UR STRIP: ABNORMAL /HPF
WBC NRBC COR # BLD: 5.34 10*3/MM3 (ref 3.4–10.8)

## 2023-07-20 PROCEDURE — 81001 URINALYSIS AUTO W/SCOPE: CPT

## 2023-07-20 PROCEDURE — 84702 CHORIONIC GONADOTROPIN TEST: CPT

## 2023-07-20 PROCEDURE — 36415 COLL VENOUS BLD VENIPUNCTURE: CPT

## 2023-07-20 PROCEDURE — 80048 BASIC METABOLIC PNL TOTAL CA: CPT

## 2023-07-20 PROCEDURE — 87086 URINE CULTURE/COLONY COUNT: CPT

## 2023-07-20 PROCEDURE — 85025 COMPLETE CBC W/AUTO DIFF WBC: CPT

## 2023-07-20 NOTE — DISCHARGE INSTRUCTIONS
Before you come to the hospital        Arrival time: AS DIRECTED BY OFFICE     YOU MAY TAKE THE FOLLOWING MEDICATION(S) THE MORNING OF SURGERY WITH A SIP OF WATER: NONE           ALL OTHER HOME MEDICATION CHECK WITH YOUR PHYSICIAN (especially if   you are taking diabetes medicines or blood thinners)            Eating and drinking restrictions prior to scheduled arrival time    2 Hours before arrival time STOP   Drinking Clear liquids (water, black coffee-NO CREAM,  apple juice-no pulp)      8 Hours before arrival time STOP   All food, full liquids, and dairy products    (It is extremely important that you follow these guidelines to prevent delay or cancelation of your procedure)     Clear Liquids  Water and flavored water                                                                      Clear Fruit juices, such as cranberry juice and apple juice.  Black coffee (NO cream of any kind, including powdered).  Plain tea  Clear bouillon or broth.  Flavored gelatin.  Soda.  Gatorade or Powerade.  Full liquid examples  Juices that have pulp.  Frozen ice pops that contain fruit pieces.  Coffee with creamer  Milk.  Yogurt.                MANAGING PAIN AFTER SURGERY    We know you are probably wondering what your pain will be like after surgery.  Following surgery it is unrealistic to expect you will not have pain.   Pain is how our bodies let us know that something is wrong or cautions us to be careful.  That said, our goal is to make your pain tolerable.    Methods we may use to treat your pain include (oral or IV medications, PCAs, epidurals, nerve blocks, etc.)   While some procedures require IV pain medications for a short time after surgery, transitioning to pain medications by mouth allows for better management of pain.   Your nurse will encourage you to take oral pain medications whenever possible.  IV medications work almost immediately, but only last a short while.  Taking medications by mouth allows for a more  constant level of medication in your blood stream for a longer period of time.      Once your pain is out of control it is harder to get back under control.  It is important you are aware when your next dose of pain medication is due.  If you are admitted, your nurse may write the time of your next dose on the white board in your room to help you remember.      We are interested in your pain and encourage you to inform us about aggravating factors during your visit.   Many times a simple repositioning every few hours can make a big difference.    If your physician says it is okay, do not let your pain prevent you from getting out of bed. Be sure to call your nurse for assistance prior to getting up so you do not fall.      Before surgery, please decide your tolerable pain goal.  These faces help describe the pain ratings we use on a 0-10 scale.   Be prepared to tell us your goal and whether or not you take pain or anxiety medications at home.          Preparing for Surgery  Preparing for surgery is an important part of your care. It can make things go more smoothly and help you avoid complications. The steps leading up to surgery may vary among hospitals. Follow all instructions given to you by your health care providers. Ask questions if you do not understand something. Talk about any concerns that you have.  Here are some questions to consider asking before your surgery:  If my surgery is not an emergency (is elective), when would be the best time to have the surgery?  What arrangements do I need to make for work, home, or school?  What will my recovery be like? How long will it be before I can return to normal activities?  Will I need to prepare my home? Will I need to arrange care for me or my children?  Should I expect to have pain after surgery? What are my pain management options? Are there nonmedical options that I can try for pain?  Tell a health care provider about:  Any allergies you have.  All medicines  you are taking, including vitamins, herbs, eye drops, creams, and over-the-counter medicines.  Any problems you or family members have had with anesthetic medicines.  Any blood disorders you have.  Any surgeries you have had.  Any medical conditions you have.  Whether you are pregnant or may be pregnant.  What are the risks?  The risks and complications of surgery depend on the specific procedure that you have. Discuss all the risks with your health care providers before your surgery. Ask about common surgical complications, which may include:  Infection.  Bleeding or a need for blood replacement (transfusion).  Allergic reactions to medicines.  Damage to surrounding nerves, tissues, or structures.  A blood clot.  Scarring.  Failure of the surgery to correct the problem.  Follow these instructions before the procedure:  Several days or weeks before your procedure  You may have a physical exam by your primary health care provider to make sure it is safe for you to have surgery.  You may have testing. This may include a chest X-ray, blood and urine tests, electrocardiogram (ECG), or other testing.  Ask your health care provider about:  Changing or stopping your regular medicines. This is especially important if you are taking diabetes medicines or blood thinners.  Taking medicines such as aspirin and ibuprofen. These medicines can thin your blood. Do not take these medicines unless your health care provider tells you to take them.  Taking over-the-counter medicines, vitamins, herbs, and supplements.  Do not use any products that contain nicotine or tobacco, such as cigarettes and e-cigarettes. If you need help quitting, ask your health care provider.  Avoid alcohol.  Ask your health care provider if there are exercises you can do to prepare for surgery.  Eat a healthy diet.   Plan to have someone 18 years of age or older to take you home from the hospital. We will need to verify your ride on the morning of surgery if  you are being discharged home on the same day. Tell your ride to be expecting a call from the hospital prior to your procedure.   Plan to have a responsible adult care for you for at least 24 hours after you leave the hospital or clinic. This is important.  The day before your procedure  You may be given antibiotic medicine to take by mouth to help prevent infection. Take it as told by your health care provider.  You may be asked to shower with a germ-killing soap.  Follow instructions from your health care provider about eating and drinking restrictions. This includes gum, mints and hard candy.  Pack comfortable clothes according to your procedure.   The day of your procedure  You may need to take another shower with a germ-killing soap before you leave home in the morning.  With a small sip of water, take only the medicines that you are told to take.  Remove all jewelry including rings.   Leave anything you consider valuable at home except hearing aids if needed.  You do not need to bring your home medications into the hospital.   Do not wear any makeup, nail polish, powder, deodorant, lotion, hair accessories, or anything on your skin or body except your clothes.  If you will be staying in the hospital, bring a case to hold your glasses, contacts, or dentures. You may also want to bring your robe and non-skid footwear.       (Do not use denture adhesives since you will be asked to remove them during  surgery).   If you wear oxygen at home, bring it with you the day of surgery.  If instructed by your health care provider, bring your sleep apnea device with you on the day of your surgery (if this applies to you).  You may want to leave your suitcase and sleep apnea device in the car until after surgery.   Arrive at the hospital as scheduled.  Bring a friend or family member with you who can help to answer questions and be present while you meet with your health care provider.  At the hospital  When you arrive at  the hospital:  Go to registration located at the main entrance of the hospital. You will be registered and given a beeper and a sticker sheet. Take the stickers to the Outpatient nurses desk and place in the black tray. This is to notify staff that you have arrived. Then return to the lobby to wait.   When your beeper lights up and vibrates proceed through the double doors, under the stairs, and a member of the Outpatient Surgery staff will escort you to your preoperative room.  You may have to wear compression sleeves. These help to prevent blood clots and reduce swelling in your legs.  An IV may be inserted into one of your veins.              In the operating room, you may be given one or more of the following:        A medicine to help you relax (sedative).        A medicine to numb the area (local anesthetic).        A medicine to make you fall asleep (general anesthetic).        A medicine that is injected into an area of your body to numb everything below the                      injection site (regional anesthetic).  You may be given an antibiotic through your IV to help prevent infection.  Your surgical site will be marked or identified.    Contact a health care provider if you:  Develop a fever of more than 100.4°F (38°C) or other feelings of illness during the 48 hours before your surgery.  Have symptoms that get worse.  Have questions or concerns about your surgery.  Summary  Preparing for surgery can make the procedure go more smoothly and lower your risk of complications.  Before surgery, make a list of questions and concerns to discuss with your surgeon. Ask about the risks and possible complications.  In the days or weeks before your surgery, follow all instructions from your health care provider. You may need to stop smoking, avoid alcohol, follow eating restrictions, and change or stop your regular medicines.  Contact your surgeon if you develop a fever or other signs of illness during the few days  before your surgery.  This information is not intended to replace advice given to you by your health care provider. Make sure you discuss any questions you have with your health care provider.  Document Revised: 12/21/2018 Document Reviewed: 10/23/2018  Elsevier Patient Education © 2021 [employer]

## 2023-07-22 LAB — BACTERIA SPEC AEROBE CULT: NO GROWTH

## 2023-07-27 ENCOUNTER — ANESTHESIA EVENT (OUTPATIENT)
Dept: PERIOP | Facility: HOSPITAL | Age: 33
End: 2023-07-27
Payer: COMMERCIAL

## 2023-07-27 ENCOUNTER — ANESTHESIA (OUTPATIENT)
Dept: PERIOP | Facility: HOSPITAL | Age: 33
End: 2023-07-27
Payer: COMMERCIAL

## 2023-07-27 ENCOUNTER — HOSPITAL ENCOUNTER (OUTPATIENT)
Facility: HOSPITAL | Age: 33
Setting detail: HOSPITAL OUTPATIENT SURGERY
Discharge: HOME | End: 2023-07-27
Attending: OBSTETRICS & GYNECOLOGY | Admitting: OBSTETRICS & GYNECOLOGY
Payer: COMMERCIAL

## 2023-07-27 VITALS
SYSTOLIC BLOOD PRESSURE: 94 MMHG | HEART RATE: 72 BPM | OXYGEN SATURATION: 98 % | DIASTOLIC BLOOD PRESSURE: 71 MMHG | TEMPERATURE: 97.4 F | RESPIRATION RATE: 20 BRPM

## 2023-07-27 DIAGNOSIS — N87.1 DYSPLASIA OF CERVIX, HIGH GRADE CIN 2: ICD-10-CM

## 2023-07-27 DIAGNOSIS — N87.9 CERVICAL DYSPLASIA: ICD-10-CM

## 2023-07-27 LAB
ABO GROUP BLD: NORMAL
B-HCG UR QL: NEGATIVE
BLD GP AB SCN SERPL QL: NEGATIVE
RH BLD: POSITIVE
T&S EXPIRATION DATE: NORMAL

## 2023-07-27 PROCEDURE — 86850 RBC ANTIBODY SCREEN: CPT | Performed by: OBSTETRICS & GYNECOLOGY

## 2023-07-27 PROCEDURE — 81025 URINE PREGNANCY TEST: CPT | Performed by: OBSTETRICS & GYNECOLOGY

## 2023-07-27 PROCEDURE — 25010000002 FENTANYL CITRATE (PF) 100 MCG/2ML SOLUTION: Performed by: NURSE ANESTHETIST, CERTIFIED REGISTERED

## 2023-07-27 PROCEDURE — 86900 BLOOD TYPING SEROLOGIC ABO: CPT | Performed by: OBSTETRICS & GYNECOLOGY

## 2023-07-27 PROCEDURE — 25010000002 ONDANSETRON PER 1 MG: Performed by: NURSE ANESTHETIST, CERTIFIED REGISTERED

## 2023-07-27 PROCEDURE — 25010000002 MIDAZOLAM PER 1 MG: Performed by: ANESTHESIOLOGY

## 2023-07-27 PROCEDURE — 25010000002 PROPOFOL 10 MG/ML EMULSION: Performed by: NURSE ANESTHETIST, CERTIFIED REGISTERED

## 2023-07-27 PROCEDURE — 25010000002 DEXAMETHASONE PER 1 MG: Performed by: NURSE ANESTHETIST, CERTIFIED REGISTERED

## 2023-07-27 PROCEDURE — 86901 BLOOD TYPING SEROLOGIC RH(D): CPT | Performed by: OBSTETRICS & GYNECOLOGY

## 2023-07-27 PROCEDURE — 88307 TISSUE EXAM BY PATHOLOGIST: CPT | Performed by: OBSTETRICS & GYNECOLOGY

## 2023-07-27 PROCEDURE — 25010000002 KETOROLAC TROMETHAMINE PER 15 MG: Performed by: NURSE ANESTHETIST, CERTIFIED REGISTERED

## 2023-07-27 RX ORDER — FENTANYL CITRATE 50 UG/ML
INJECTION, SOLUTION INTRAMUSCULAR; INTRAVENOUS AS NEEDED
Status: DISCONTINUED | OUTPATIENT
Start: 2023-07-27 | End: 2023-07-27 | Stop reason: SURG

## 2023-07-27 RX ORDER — IBUPROFEN 600 MG/1
600 TABLET ORAL EVERY 6 HOURS PRN
Qty: 90 TABLET | Refills: 2 | Status: SHIPPED | OUTPATIENT
Start: 2023-07-27

## 2023-07-27 RX ORDER — DROPERIDOL 2.5 MG/ML
0.62 INJECTION, SOLUTION INTRAMUSCULAR; INTRAVENOUS ONCE AS NEEDED
Status: DISCONTINUED | OUTPATIENT
Start: 2023-07-27 | End: 2023-07-27 | Stop reason: HOSPADM

## 2023-07-27 RX ORDER — SODIUM CHLORIDE, SODIUM LACTATE, POTASSIUM CHLORIDE, CALCIUM CHLORIDE 600; 310; 30; 20 MG/100ML; MG/100ML; MG/100ML; MG/100ML
125 INJECTION, SOLUTION INTRAVENOUS CONTINUOUS
Status: DISCONTINUED | OUTPATIENT
Start: 2023-07-27 | End: 2023-07-27 | Stop reason: HOSPADM

## 2023-07-27 RX ORDER — LIDOCAINE HYDROCHLORIDE 20 MG/ML
INJECTION, SOLUTION INFILTRATION; PERINEURAL AS NEEDED
Status: DISCONTINUED | OUTPATIENT
Start: 2023-07-27 | End: 2023-07-27 | Stop reason: HOSPADM

## 2023-07-27 RX ORDER — NALOXONE HCL 0.4 MG/ML
0.4 VIAL (ML) INJECTION AS NEEDED
Status: DISCONTINUED | OUTPATIENT
Start: 2023-07-27 | End: 2023-07-27 | Stop reason: HOSPADM

## 2023-07-27 RX ORDER — KETOROLAC TROMETHAMINE 30 MG/ML
INJECTION, SOLUTION INTRAMUSCULAR; INTRAVENOUS AS NEEDED
Status: DISCONTINUED | OUTPATIENT
Start: 2023-07-27 | End: 2023-07-27 | Stop reason: SURG

## 2023-07-27 RX ORDER — SODIUM CHLORIDE 9 MG/ML
40 INJECTION, SOLUTION INTRAVENOUS AS NEEDED
Status: DISCONTINUED | OUTPATIENT
Start: 2023-07-27 | End: 2023-07-27 | Stop reason: HOSPADM

## 2023-07-27 RX ORDER — ONDANSETRON 2 MG/ML
4 INJECTION INTRAMUSCULAR; INTRAVENOUS ONCE AS NEEDED
Status: DISCONTINUED | OUTPATIENT
Start: 2023-07-27 | End: 2023-07-27 | Stop reason: HOSPADM

## 2023-07-27 RX ORDER — OXYCODONE AND ACETAMINOPHEN 7.5; 325 MG/1; MG/1
1 TABLET ORAL ONCE AS NEEDED
Status: DISCONTINUED | OUTPATIENT
Start: 2023-07-27 | End: 2023-07-27 | Stop reason: HOSPADM

## 2023-07-27 RX ORDER — ACETAMINOPHEN 500 MG
1000 TABLET ORAL ONCE
Status: COMPLETED | OUTPATIENT
Start: 2023-07-27 | End: 2023-07-27

## 2023-07-27 RX ORDER — SODIUM CHLORIDE, SODIUM LACTATE, POTASSIUM CHLORIDE, CALCIUM CHLORIDE 600; 310; 30; 20 MG/100ML; MG/100ML; MG/100ML; MG/100ML
100 INJECTION, SOLUTION INTRAVENOUS CONTINUOUS
Status: DISCONTINUED | OUTPATIENT
Start: 2023-07-27 | End: 2023-07-27 | Stop reason: HOSPADM

## 2023-07-27 RX ORDER — MIDAZOLAM HYDROCHLORIDE 1 MG/ML
2 INJECTION INTRAMUSCULAR; INTRAVENOUS ONCE
Status: COMPLETED | OUTPATIENT
Start: 2023-07-27 | End: 2023-07-27

## 2023-07-27 RX ORDER — HYDROCODONE BITARTRATE AND ACETAMINOPHEN 10; 325 MG/1; MG/1
1 TABLET ORAL EVERY 4 HOURS PRN
Status: DISCONTINUED | OUTPATIENT
Start: 2023-07-27 | End: 2023-07-27 | Stop reason: HOSPADM

## 2023-07-27 RX ORDER — LIDOCAINE HYDROCHLORIDE 20 MG/ML
INJECTION, SOLUTION EPIDURAL; INFILTRATION; INTRACAUDAL; PERINEURAL AS NEEDED
Status: DISCONTINUED | OUTPATIENT
Start: 2023-07-27 | End: 2023-07-27 | Stop reason: SURG

## 2023-07-27 RX ORDER — SODIUM CHLORIDE 0.9 % (FLUSH) 0.9 %
3-10 SYRINGE (ML) INJECTION AS NEEDED
Status: DISCONTINUED | OUTPATIENT
Start: 2023-07-27 | End: 2023-07-27 | Stop reason: HOSPADM

## 2023-07-27 RX ORDER — PROPOFOL 10 MG/ML
VIAL (ML) INTRAVENOUS AS NEEDED
Status: DISCONTINUED | OUTPATIENT
Start: 2023-07-27 | End: 2023-07-27 | Stop reason: SURG

## 2023-07-27 RX ORDER — MIDAZOLAM HYDROCHLORIDE 1 MG/ML
1 INJECTION INTRAMUSCULAR; INTRAVENOUS
Status: DISCONTINUED | OUTPATIENT
Start: 2023-07-27 | End: 2023-07-27 | Stop reason: HOSPADM

## 2023-07-27 RX ORDER — IBUPROFEN 600 MG/1
600 TABLET ORAL EVERY 6 HOURS PRN
Status: DISCONTINUED | OUTPATIENT
Start: 2023-07-27 | End: 2023-07-27 | Stop reason: HOSPADM

## 2023-07-27 RX ORDER — SODIUM CHLORIDE 0.9 % (FLUSH) 0.9 %
1-10 SYRINGE (ML) INJECTION AS NEEDED
Status: DISCONTINUED | OUTPATIENT
Start: 2023-07-27 | End: 2023-07-27 | Stop reason: HOSPADM

## 2023-07-27 RX ORDER — SODIUM CHLORIDE 0.9 % (FLUSH) 0.9 %
3 SYRINGE (ML) INJECTION EVERY 12 HOURS SCHEDULED
Status: DISCONTINUED | OUTPATIENT
Start: 2023-07-27 | End: 2023-07-27 | Stop reason: HOSPADM

## 2023-07-27 RX ORDER — HYDROCODONE BITARTRATE AND ACETAMINOPHEN 5; 325 MG/1; MG/1
1 TABLET ORAL ONCE AS NEEDED
Status: DISCONTINUED | OUTPATIENT
Start: 2023-07-27 | End: 2023-07-27 | Stop reason: HOSPADM

## 2023-07-27 RX ORDER — TRAMADOL HYDROCHLORIDE 50 MG/1
50 TABLET ORAL EVERY 6 HOURS PRN
Qty: 10 TABLET | Refills: 0 | Status: SHIPPED | OUTPATIENT
Start: 2023-07-27

## 2023-07-27 RX ORDER — FENTANYL CITRATE 50 UG/ML
25 INJECTION, SOLUTION INTRAMUSCULAR; INTRAVENOUS
Status: DISCONTINUED | OUTPATIENT
Start: 2023-07-27 | End: 2023-07-27 | Stop reason: HOSPADM

## 2023-07-27 RX ORDER — IBUPROFEN 600 MG/1
600 TABLET ORAL ONCE AS NEEDED
Status: DISCONTINUED | OUTPATIENT
Start: 2023-07-27 | End: 2023-07-27 | Stop reason: HOSPADM

## 2023-07-27 RX ORDER — FLUMAZENIL 0.1 MG/ML
0.2 INJECTION INTRAVENOUS AS NEEDED
Status: DISCONTINUED | OUTPATIENT
Start: 2023-07-27 | End: 2023-07-27 | Stop reason: HOSPADM

## 2023-07-27 RX ORDER — ONDANSETRON 2 MG/ML
INJECTION INTRAMUSCULAR; INTRAVENOUS AS NEEDED
Status: DISCONTINUED | OUTPATIENT
Start: 2023-07-27 | End: 2023-07-27 | Stop reason: SURG

## 2023-07-27 RX ORDER — DEXAMETHASONE SODIUM PHOSPHATE 4 MG/ML
INJECTION, SOLUTION INTRA-ARTICULAR; INTRALESIONAL; INTRAMUSCULAR; INTRAVENOUS; SOFT TISSUE AS NEEDED
Status: DISCONTINUED | OUTPATIENT
Start: 2023-07-27 | End: 2023-07-27 | Stop reason: SURG

## 2023-07-27 RX ORDER — LABETALOL HYDROCHLORIDE 5 MG/ML
5 INJECTION, SOLUTION INTRAVENOUS
Status: DISCONTINUED | OUTPATIENT
Start: 2023-07-27 | End: 2023-07-27 | Stop reason: HOSPADM

## 2023-07-27 RX ORDER — SCOLOPAMINE TRANSDERMAL SYSTEM 1 MG/1
1 PATCH, EXTENDED RELEASE TRANSDERMAL ONCE
Status: DISCONTINUED | OUTPATIENT
Start: 2023-07-27 | End: 2023-07-27 | Stop reason: HOSPADM

## 2023-07-27 RX ORDER — SODIUM CHLORIDE 0.9 % (FLUSH) 0.9 %
10 SYRINGE (ML) INJECTION EVERY 12 HOURS SCHEDULED
Status: DISCONTINUED | OUTPATIENT
Start: 2023-07-27 | End: 2023-07-27 | Stop reason: HOSPADM

## 2023-07-27 RX ADMIN — SCOPALAMINE 1 PATCH: 1 PATCH, EXTENDED RELEASE TRANSDERMAL at 06:30

## 2023-07-27 RX ADMIN — LIDOCAINE HYDROCHLORIDE 80 MG: 20 INJECTION, SOLUTION EPIDURAL; INFILTRATION; INTRACAUDAL; PERINEURAL at 06:59

## 2023-07-27 RX ADMIN — MIDAZOLAM HYDROCHLORIDE 2 MG: 1 INJECTION, SOLUTION INTRAMUSCULAR; INTRAVENOUS at 06:31

## 2023-07-27 RX ADMIN — FENTANYL CITRATE 50 MCG: 50 INJECTION, SOLUTION INTRAMUSCULAR; INTRAVENOUS at 07:23

## 2023-07-27 RX ADMIN — SODIUM CHLORIDE, POTASSIUM CHLORIDE, SODIUM LACTATE AND CALCIUM CHLORIDE 125 ML/HR: 600; 310; 30; 20 INJECTION, SOLUTION INTRAVENOUS at 05:51

## 2023-07-27 RX ADMIN — DEXAMETHASONE SODIUM PHOSPHATE 8 MG: 4 INJECTION, SOLUTION INTRA-ARTICULAR; INTRALESIONAL; INTRAMUSCULAR; INTRAVENOUS; SOFT TISSUE at 07:06

## 2023-07-27 RX ADMIN — ONDANSETRON 4 MG: 2 INJECTION INTRAMUSCULAR; INTRAVENOUS at 07:06

## 2023-07-27 RX ADMIN — SODIUM CHLORIDE, POTASSIUM CHLORIDE, SODIUM LACTATE AND CALCIUM CHLORIDE 100 ML/HR: 600; 310; 30; 20 INJECTION, SOLUTION INTRAVENOUS at 07:35

## 2023-07-27 RX ADMIN — ACETAMINOPHEN 1000 MG: 500 TABLET, FILM COATED ORAL at 06:31

## 2023-07-27 RX ADMIN — PROPOFOL INJECTABLE EMULSION 200 MG: 10 INJECTION, EMULSION INTRAVENOUS at 06:59

## 2023-07-27 RX ADMIN — FENTANYL CITRATE 50 MCG: 50 INJECTION, SOLUTION INTRAMUSCULAR; INTRAVENOUS at 07:07

## 2023-07-27 RX ADMIN — KETOROLAC TROMETHAMINE 30 MG: 30 INJECTION, SOLUTION INTRAMUSCULAR; INTRAVENOUS at 07:13

## 2023-07-27 NOTE — ANESTHESIA PROCEDURE NOTES
Airway  Urgency: elective    Date/Time: 7/27/2023 7:00 AM  Airway not difficult    General Information and Staff    Patient location during procedure: OR  CRNA/CAA: Randal Menchaca CRNA    Indications and Patient Condition  Indications for airway management: airway protection    Preoxygenated: yes  Mask difficulty assessment: 1 - vent by mask    Final Airway Details  Final airway type: supraglottic airway      Successful airway: Supreme  Size 3     Number of attempts at approach: 1  Assessment: lips, teeth, and gum same as pre-op and atraumatic intubation

## 2023-07-27 NOTE — ANESTHESIA POSTPROCEDURE EVALUATION
Patient: Laura Morales    Procedure Summary       Date: 07/27/23 Room / Location:  [facility] OR  /  [facility] OR    Anesthesia Start: 0657 Anesthesia Stop: 0727    Procedure: LOOP ELECTROSURGERY EXCISION PROCEDURE (Cervix) Diagnosis: (CIN2, MODERATE DYSPLASIA)    Surgeons: Lissett Belle MD Provider: Randal Menchaca CRNA    Anesthesia Type: general ASA Status: 2            Anesthesia Type: general    Vitals  Vitals Value Taken Time   BP 99/66 07/27/23 0759   Temp 97.4 °F (36.3 °C) 07/27/23 0758   Pulse 79 07/27/23 0804   Resp 18 07/27/23 0758   SpO2 97 % 07/27/23 0804   Vitals shown include unvalidated device data.        Post Anesthesia Care and Evaluation    Patient location during evaluation: PACU  Patient participation: complete - patient participated  Level of consciousness: awake and alert  Pain management: adequate    Airway patency: patent  Anesthetic complications: No anesthetic complications    Cardiovascular status: acceptable  Respiratory status: acceptable  Hydration status: acceptable    Comments: Blood pressure 104/74, pulse 81, temperature 97.4 °F (36.3 °C), temperature source Temporal, resp. rate 20, last menstrual period 07/04/2023, SpO2 97 %, not currently breastfeeding.    Pt discharged from PACU based on triston score >8

## 2023-07-27 NOTE — OP NOTE
Date of Service:  07/27/23  Time of Service:  07:17 CDT    Surgical Staff: Surgeon(s) and Role:     * Lissett Belle MD - Primary   Additional Staff: None   Pre-operative diagnosis(es): Moderate   Severe Dysplasia     Post-operative diagnosis(es): Moderate Severe Dysplasia   Procedure(s): Procedure(s):  LOOP ELECTROCAUTERY EXCISION PROCEDURE  PARACERVICAL BLOCK       Anesthesia: Type: General  ASA:  ASA status not filed in the log.            Operative findings: Moderately absent staining throughout   Specimens removed: LEEP   Fluid Intake: 600mL   Output: Documented Output  Est. Blood Loss 10mL  Urine Output 3mL          Complications: None   Intraoperative consult(s):    Condition: stable       Disposition: to PACU and then  home         Discription of procedure:        After informed consent, the patient was taken to the operating room/procedure room and placed in a modified dorsal lithotomy position in candycane stirrups.  A coated speculum was placed into the vagina and the anterior lip of the cervix grasped with a single tooth tenaculum.      Strong Iodine solution placed on cervix, and the abnormal area visualized at: R. margin.  A 20mm x 10mm Loop used to excise entire abnormal portion.  Endocervical bed cauterized to effect hemostasis.     Next an 18g needle was used to inject  8 mL 2% Lidocaine paracervically to aid with postoperative pain.    Sponge, lap & needle counts were correct x 2. The patient tolerated the procedure without complications and was moved to PACU awake for postoperative care.        Leep procedure without complications  Follow up in office in 2 weeks         Lissett Belle MD  7/27/2023  07:16 CDT

## 2023-07-27 NOTE — ANESTHESIA PREPROCEDURE EVALUATION
Anesthesia Evaluation     Patient summary reviewed   no history of anesthetic complications:   NPO Solid Status: > 8 hours  NPO Liquid Status: > 8 hours           Airway   Mallampati: I  TM distance: >3 FB  Neck ROM: full  No difficulty expected  Dental - normal exam     Pulmonary - negative pulmonary ROS   Cardiovascular - negative cardio ROS        Neuro/Psych- negative ROS  GI/Hepatic/Renal/Endo - negative ROS     Musculoskeletal (-) negative ROS    Abdominal    Substance History      OB/GYN          Other - negative ROS                       Anesthesia Plan    ASA 2     general     (Very anxious )  intravenous induction     Anesthetic plan, risks, benefits, and alternatives have been provided, discussed and informed consent has been obtained with: patient.

## 2023-07-28 LAB
CYTO UR: NORMAL
LAB AP CASE REPORT: NORMAL
Lab: NORMAL
PATH REPORT.FINAL DX SPEC: NORMAL
PATH REPORT.GROSS SPEC: NORMAL

## 2023-09-12 ENCOUNTER — OFFICE VISIT (OUTPATIENT)
Dept: FAMILY MEDICINE CLINIC | Facility: CLINIC | Age: 33
End: 2023-09-12
Payer: COMMERCIAL

## 2023-09-12 VITALS
BODY MASS INDEX: 19.31 KG/M2 | RESPIRATION RATE: 20 BRPM | SYSTOLIC BLOOD PRESSURE: 96 MMHG | WEIGHT: 109 LBS | DIASTOLIC BLOOD PRESSURE: 62 MMHG | HEART RATE: 91 BPM | HEIGHT: 63 IN

## 2023-09-12 DIAGNOSIS — F90.2 ATTENTION DEFICIT HYPERACTIVITY DISORDER (ADHD), COMBINED TYPE: Primary | ICD-10-CM

## 2023-09-12 PROCEDURE — 99212 OFFICE O/P EST SF 10 MIN: CPT | Performed by: NURSE PRACTITIONER

## 2023-09-12 RX ORDER — VALACYCLOVIR HYDROCHLORIDE 500 MG/1
TABLET, FILM COATED ORAL
COMMUNITY
Start: 2023-08-18

## 2023-09-12 RX ORDER — DEXTROAMPHETAMINE SULFATE, DEXTROAMPHETAMINE SACCHARATE, AMPHETAMINE SULFATE AND AMPHETAMINE ASPARTATE 7.5; 7.5; 7.5; 7.5 MG/1; MG/1; MG/1; MG/1
CAPSULE, EXTENDED RELEASE ORAL
COMMUNITY
Start: 2023-08-18

## 2023-09-12 NOTE — LETTER
September 12, 2023    Laura Morales  [address] [place] [id number]        To whom it may concern:    Laura Morales has not been taking her Adderall for 2 weeks and is stable without the medication.                         Mariaa Alejandre, APRN

## 2023-09-12 NOTE — LETTER
September 12, 2023    Laura Morales  [address] [place] [id number]        To whom it may concern:    Laura Morales is stable and functions well without her ADHD medication.                         Mariaa Alejandre, APRN

## 2023-09-12 NOTE — PROGRESS NOTES
"Chief Complaint  ADHD and letter for     Subjective    History of Present Illness      Patient presents to [facility] for   History of Present Illness  Pt is here today for a letter for  stating she is no longer taking ADHD medication     Review of Systems    I have reviewed and agree with the HPI information as above.  Mariaa Alejandre, APRN     Objective   Vital Signs:   BP 96/62   Pulse 91   Resp 20   Ht 161 cm (63.39\")   Wt 49.4 kg (109 lb)   BMI 19.07 kg/m²     BMI is within normal parameters. No other follow-up for BMI required.      Physical Exam  Vitals and nursing note reviewed.   Constitutional:       Appearance: Normal appearance. She is well-developed.   HENT:      Head: Normocephalic and atraumatic.      Right Ear: Tympanic membrane, ear canal and external ear normal.      Left Ear: Tympanic membrane, ear canal and external ear normal.      Nose: Nose normal. No septal deviation, nasal tenderness or congestion.      Mouth/Throat:      Lips: Pink. No lesions.      Mouth: Mucous membranes are moist. No oral lesions.      Dentition: Normal dentition.      Pharynx: Oropharynx is clear. No pharyngeal swelling, oropharyngeal exudate or posterior oropharyngeal erythema.   Eyes:      General: Lids are normal. Vision grossly intact. No scleral icterus.        Right eye: No discharge.         Left eye: No discharge.      Extraocular Movements: Extraocular movements intact.      Conjunctiva/sclera: Conjunctivae normal.      Right eye: Right conjunctiva is not injected.      Left eye: Left conjunctiva is not injected.      Pupils: Pupils are equal, round, and reactive to light.   Neck:      Thyroid: No thyroid mass.      Trachea: Trachea normal.   Cardiovascular:      Rate and Rhythm: Normal rate and regular rhythm.      Heart sounds: Normal heart sounds. No murmur heard.    No gallop.   Pulmonary:      Effort: Pulmonary effort is normal.      Breath sounds: " Normal breath sounds and air entry. No wheezing, rhonchi or rales.   Musculoskeletal:         General: No tenderness or deformity. Normal range of motion.      Cervical back: Full passive range of motion without pain, normal range of motion and neck supple.      Thoracic back: Normal.      Right lower leg: No edema.      Left lower leg: No edema.   Skin:     General: Skin is warm and dry.      Coloration: Skin is not jaundiced.      Findings: No rash.   Neurological:      Mental Status: She is alert and oriented to person, place, and time.      Sensory: Sensation is intact.      Motor: Motor function is intact.      Coordination: Coordination is intact.      Gait: Gait is intact.      Deep Tendon Reflexes: Reflexes are normal and symmetric.   Psychiatric:         Mood and Affect: Mood and affect normal.         Behavior: Behavior normal.         Judgment: Judgment normal.        HARSHAL-7:      PHQ-2 Depression Screening  Little interest or pleasure in doing things? 0-->not at all   Feeling down, depressed, or hopeless? 0-->not at all   PHQ-2 Total Score 0     PHQ-9 Depression Screening  Little interest or pleasure in doing things? 0-->not at all   Feeling down, depressed, or hopeless? 0-->not at all   Trouble falling or staying asleep, or sleeping too much?     Feeling tired or having little energy?     Poor appetite or overeating?     Feeling bad about yourself - or that you are a failure or have let yourself or your family down?     Trouble concentrating on things, such as reading the newspaper or watching television?     Moving or speaking so slowly that other people could have noticed? Or the opposite - being so fidgety or restless that you have been moving around a lot more than usual?     Thoughts that you would be better off dead, or of hurting yourself in some way?     PHQ-9 Total Score 0   If you checked off any problems, how difficult have these problems made it for you to do your work, take care of things at  home, or get along with other people?        Result Review  Data Reviewed:                   Assessment and Plan      Diagnoses and all orders for this visit:    1. Attention deficit hyperactivity disorder (ADHD), combined type (Primary)    Patient is no longer taking Adderall. 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         Follow Up   Return if symptoms worsen or fail to improve.  Patient was given instructions and counseling regarding her condition or for health maintenance advice. Please see specific information pulled into the AVS if appropriate.

## 2023-11-09 ENCOUNTER — NURSE TRIAGE (OUTPATIENT)
Dept: CALL CENTER | Facility: HOSPITAL | Age: 33
End: 2023-11-09
Payer: COMMERCIAL

## 2023-11-09 NOTE — TELEPHONE ENCOUNTER
"Headache feels like she needs to sneeze  Reason for Disposition   [1] MODERATE headache (e.g., interferes with normal activities) AND [2] present > 24 hours AND [3] unexplained  (Exceptions: analgesics not tried, typical migraine, or headache part of viral illness)    Additional Information   Negative: Difficult to awaken or acting confused (e.g., disoriented, slurred speech)   Negative: [1] Weakness of the face, arm or leg on one side of the body AND [2] new-onset   Negative: [1] Numbness of the face, arm or leg on one side of the body AND [2] new-onset   Negative: [1] Loss of speech or garbled speech AND [2] new-onset   Negative: Passed out (i.e., lost consciousness, collapsed and was not responding)   Negative: Sounds like a life-threatening emergency to the triager   Negative: Followed a head injury   Negative: Pregnant   Negative: Postpartum (from 0 to 6 weeks after delivery)   Negative: Traumatic Brain Injury (TBI) is suspected   Negative: Unable to walk, or can only walk with assistance (e.g., requires support)   Negative: Stiff neck (can't touch chin to chest)   Negative: Severe pain in one eye   Negative: [1] Other family members (or people in same household) with headaches AND [2] possibility of carbon monoxide exposure   Negative: [1] SEVERE headache (e.g., excruciating) AND [2] \"worst headache\" of life   Negative: [1] SEVERE headache AND [2] sudden-onset (i.e., reaching maximum intensity within seconds to 1 hour)   Negative: [1] SEVERE headache AND [2] fever   Negative: Loss of vision or double vision  (Exception: Same as prior migraines.)   Negative: [1] Fever > 100.0 F (37.8 C) AND [2] diabetes mellitus or weak immune system (e.g., HIV positive, cancer chemo, splenectomy, organ transplant, chronic steroids)   Negative: Patient sounds very sick or weak to the triager   Negative: [1] SEVERE headache (e.g., excruciating) AND [2] not improved after 2 hours of pain medicine   Negative: [1] Vomiting AND [2] " "2 or more times  (Exception: Similar to previous migraines.)   Negative: Fever > 104 F (40 C)    Answer Assessment - Initial Assessment Questions  1. LOCATION: \"Where does it hurt?\"       Left side behind eye and temple. Band around head  2. ONSET: \"When did the headache start?\" (Minutes, hours or days)       3 days ago  3. PATTERN: \"Does the pain come and go, or has it been constant since it started?\"      constant  4. SEVERITY: \"How bad is the pain?\" and \"What does it keep you from doing?\"  (e.g., Scale 1-10; mild, moderate, or severe)    - MILD (1-3): doesn't interfere with normal activities     - MODERATE (4-7): interferes with normal activities or awakens from sleep     - SEVERE (8-10): excruciating pain, unable to do any normal activities     7/10  5. RECURRENT SYMPTOM: \"Have you ever had headaches before?\" If Yes, ask: \"When was the last time?\" and \"What happened that time?\"      2 years ago and dx with COVID  6. CAUSE: \"What do you think is causing the headache?\"      Not sure  7. MIGRAINE: \"Have you been diagnosed with migraine headaches?\" If Yes, ask: \"Is this headache similar?\"      denies  8. HEAD INJURY: \"Has there been any recent injury to the head?\"   denies  9. OTHER SYMPTOMS: \"Do you have any other symptoms?\" (fever, stiff neck, eye pain, sore throat, cold symptoms)      left eye feels like it will \"pop\" out of head,  slight chest congestion  Light and noise sensitivity  10. PREGNANCY: \"Is there any chance you are pregnant?\" \"When was your last menstrual period?\"      na    Protocols used: Headache-ADULT-AH    "

## 2023-12-04 ENCOUNTER — OFFICE VISIT (OUTPATIENT)
Dept: FAMILY MEDICINE CLINIC | Facility: CLINIC | Age: 33
End: 2023-12-04
Payer: COMMERCIAL

## 2023-12-04 ENCOUNTER — TELEPHONE (OUTPATIENT)
Dept: FAMILY MEDICINE CLINIC | Facility: CLINIC | Age: 33
End: 2023-12-04
Payer: COMMERCIAL

## 2023-12-04 ENCOUNTER — LAB (OUTPATIENT)
Dept: LAB | Facility: HOSPITAL | Age: 33
End: 2023-12-04
Payer: COMMERCIAL

## 2023-12-04 VITALS
SYSTOLIC BLOOD PRESSURE: 100 MMHG | HEART RATE: 85 BPM | OXYGEN SATURATION: 99 % | BODY MASS INDEX: 19.81 KG/M2 | HEIGHT: 63 IN | DIASTOLIC BLOOD PRESSURE: 65 MMHG | TEMPERATURE: 98.2 F | WEIGHT: 111.8 LBS

## 2023-12-04 DIAGNOSIS — H65.93 FLUID LEVEL BEHIND TYMPANIC MEMBRANE OF BOTH EARS: ICD-10-CM

## 2023-12-04 DIAGNOSIS — R50.9 FEVER, UNSPECIFIED FEVER CAUSE: ICD-10-CM

## 2023-12-04 DIAGNOSIS — Z51.81 MEDICATION MONITORING ENCOUNTER: ICD-10-CM

## 2023-12-04 DIAGNOSIS — F90.2 ATTENTION DEFICIT HYPERACTIVITY DISORDER (ADHD), COMBINED TYPE: Primary | ICD-10-CM

## 2023-12-04 LAB
AMPHET+METHAMPHET UR QL: NEGATIVE
AMPHETAMINES UR QL: NEGATIVE
BARBITURATES UR QL SCN: NEGATIVE
BENZODIAZ UR QL SCN: NEGATIVE
BUPRENORPHINE SERPL-MCNC: NEGATIVE NG/ML
CANNABINOIDS SERPL QL: NEGATIVE
COCAINE UR QL: NEGATIVE
METHADONE UR QL SCN: NEGATIVE
OPIATES UR QL: NEGATIVE
OXYCODONE UR QL SCN: NEGATIVE
PCP UR QL SCN: NEGATIVE
TRICYCLICS UR QL SCN: NEGATIVE

## 2023-12-04 PROCEDURE — 80307 DRUG TEST PRSMV CHEM ANLYZR: CPT

## 2023-12-04 PROCEDURE — 0202U NFCT DS 22 TRGT SARS-COV-2: CPT

## 2023-12-04 RX ORDER — DEXAMETHASONE SODIUM PHOSPHATE 4 MG/ML
8 INJECTION, SOLUTION INTRA-ARTICULAR; INTRALESIONAL; INTRAMUSCULAR; INTRAVENOUS; SOFT TISSUE ONCE
Status: COMPLETED | OUTPATIENT
Start: 2023-12-04 | End: 2023-12-04

## 2023-12-04 RX ORDER — DEXTROAMPHETAMINE SACCHARATE, AMPHETAMINE ASPARTATE MONOHYDRATE, DEXTROAMPHETAMINE SULFATE AND AMPHETAMINE SULFATE 7.5; 7.5; 7.5; 7.5 MG/1; MG/1; MG/1; MG/1
30 CAPSULE, EXTENDED RELEASE ORAL EVERY MORNING
Qty: 30 CAPSULE | Refills: 0 | Status: SHIPPED | OUTPATIENT
Start: 2023-12-04 | End: 2024-01-03

## 2023-12-04 RX ORDER — DEXTROAMPHETAMINE SACCHARATE, AMPHETAMINE ASPARTATE MONOHYDRATE, DEXTROAMPHETAMINE SULFATE AND AMPHETAMINE SULFATE 7.5; 7.5; 7.5; 7.5 MG/1; MG/1; MG/1; MG/1
30 CAPSULE, EXTENDED RELEASE ORAL EVERY MORNING
Qty: 30 CAPSULE | Refills: 0 | Status: SHIPPED | OUTPATIENT
Start: 2024-01-01 | End: 2024-01-31

## 2023-12-04 RX ORDER — FLUTICASONE PROPIONATE 50 MCG
2 SPRAY, SUSPENSION (ML) NASAL DAILY
Qty: 18.2 ML | Refills: 2 | Status: SHIPPED | OUTPATIENT
Start: 2023-12-04

## 2023-12-04 RX ADMIN — DEXAMETHASONE SODIUM PHOSPHATE 8 MG: 4 INJECTION, SOLUTION INTRA-ARTICULAR; INTRALESIONAL; INTRAMUSCULAR; INTRAVENOUS; SOFT TISSUE at 15:09

## 2023-12-04 NOTE — PROGRESS NOTES
After obtaining consent, and per orders of NAEEM Rod, injection of Decadron 8mg given by Miranda Garcia RN. Patient instructed to remain in clinic for 20 minutes afterwards, and to report any adverse reaction to me immediately. Pt tolerated well.

## 2023-12-04 NOTE — TELEPHONE ENCOUNTER
----- Message from NAEEM Funes sent at 12/4/2023  4:04 PM CST -----  UDS appropriate. Scripts sent to Dr. Valentine to sign.

## 2023-12-04 NOTE — PROGRESS NOTES
"Chief Complaint  Fever, Chills, Headache, Dizziness, Nausea, Generalized Body Aches, and Earache    Subjective    History of Present Illness      Patient presents to [facility] for   History of Present Illness  Pt presents today with Fever, Chills, Headache, Dizziness, Nausea, Generalized Body Aches, and Earache. Pt states the headache has been making her vision a little blurry. Ongoing since 12/1 and was seen in urgent care on 12/1 (tagged urgent care notes) only prescribed zofran. Pt tried taking BC powder to help relief some of the headache.  Pt wanting to discuss being put back on adderall xr 30mg        Review of Systems   Constitutional:  Positive for fatigue and fever.   HENT:  Positive for ear pain.    Eyes: Negative.    Respiratory: Negative.     Cardiovascular: Negative.    Gastrointestinal:  Positive for nausea.   Endocrine: Negative.    Genitourinary: Negative.    Musculoskeletal: Negative.    Skin: Negative.    Allergic/Immunologic: Negative.    Neurological:  Positive for dizziness and headache.   Hematological: Negative.    Psychiatric/Behavioral: Negative.         I have reviewed and agree with the HPI and ROS information as above.  Mariaa Medina, NAEEM     Objective   Vital Signs:   /65   Pulse 85   Temp 98.2 °F (36.8 °C) (Infrared)   Ht 160 cm (62.99\")   Wt 50.7 kg (111 lb 12.8 oz)   SpO2 99%   BMI 19.81 kg/m²     BMI is within normal parameters. No other follow-up for BMI required.      Physical Exam  Constitutional:       Appearance: Normal appearance. She is well-developed.   HENT:      Head: Normocephalic and atraumatic.      Right Ear: External ear normal. A middle ear effusion is present.      Left Ear: External ear normal.      Nose: Nose normal. No nasal tenderness or congestion.      Mouth/Throat:      Lips: Pink. No lesions.      Mouth: Mucous membranes are moist. No oral lesions.      Dentition: Normal dentition.      Pharynx: Oropharynx is " clear. No pharyngeal swelling, oropharyngeal exudate or posterior oropharyngeal erythema.   Eyes:      General: Lids are normal. Vision grossly intact. No scleral icterus.        Right eye: No discharge.         Left eye: No discharge.      Extraocular Movements: Extraocular movements intact.      Conjunctiva/sclera: Conjunctivae normal.      Right eye: Right conjunctiva is not injected.      Left eye: Left conjunctiva is not injected.      Pupils: Pupils are equal, round, and reactive to light.   Cardiovascular:      Rate and Rhythm: Normal rate and regular rhythm.      Heart sounds: Normal heart sounds. No murmur heard.     No gallop.   Pulmonary:      Effort: Pulmonary effort is normal.      Breath sounds: Normal breath sounds and air entry. No wheezing, rhonchi or rales.   Musculoskeletal:         General: No tenderness or deformity. Normal range of motion.      Cervical back: Full passive range of motion without pain, normal range of motion and neck supple.      Right lower leg: No edema.      Left lower leg: No edema.   Skin:     General: Skin is warm and dry.      Coloration: Skin is not jaundiced.      Findings: No rash.   Neurological:      Mental Status: She is alert and oriented to person, place, and time.      Sensory: Sensation is intact.      Motor: Motor function is intact.      Coordination: Coordination is intact.      Gait: Gait is intact.   Psychiatric:         Attention and Perception: Attention normal.         Mood and Affect: Mood and affect normal.         Behavior: Behavior is not hyperactive. Behavior is cooperative.         Thought Content: Thought content normal.         Judgment: Judgment normal.          HARSHAL-7:      PHQ-2 Depression Screening  Little interest or pleasure in doing things?     Feeling down, depressed, or hopeless?     PHQ-2 Total Score       PHQ-9 Depression Screening  Little interest or pleasure in doing things?     Feeling down, depressed, or hopeless?     Trouble  falling or staying asleep, or sleeping too much?     Feeling tired or having little energy?     Poor appetite or overeating?     Feeling bad about yourself - or that you are a failure or have let yourself or your family down?     Trouble concentrating on things, such as reading the newspaper or watching television?     Moving or speaking so slowly that other people could have noticed? Or the opposite - being so fidgety or restless that you have been moving around a lot more than usual?     Thoughts that you would be better off dead, or of hurting yourself in some way?     PHQ-9 Total Score     If you checked off any problems, how difficult have these problems made it for you to do your work, take care of things at home, or get along with other people?        Result Review  Data Reviewed:   The following data was reviewed by: NAEEM Hensley on 12/04/2023:  Urine Drug Screen - Urine, Clean Catch (11/28/2022 12:33)          UC with Brandon Buenrostro MD (12/01/2023)   Assessment and Plan      Diagnoses and all orders for this visit:    1. Attention deficit hyperactivity disorder (ADHD), combined type (Primary)    2. Fluid level behind tympanic membrane of both ears  -     dexAMETHasone (DECADRON) injection 8 mg  -     fluticasone (FLONASE) 50 MCG/ACT nasal spray; 2 sprays into the nostril(s) as directed by provider Daily.  Dispense: 18.2 mL; Refill: 2    3. Fever, unspecified fever cause  -     Respiratory Panel PCR w/COVID-19(SARS-CoV-2) DANA/CELIA/AIDE/PAD/COR/MIROSLAVA In-House, NP Swab in UTM/VTM, 2 HR TAT - Swab, Nasopharynx; Future    4. Medication monitoring encounter  -     Urine Drug Screen - Urine, Clean Catch; Future      Patient here today to discuss restarting ADHD medications as well as complaints of fever, body aches, chills, headache, and dizziness.  Symptoms began on Friday.  She denies any cough or sinus congestion.  She does report bilateral ear pain and pain in her jaw which she feels is related to  sinuses.  Denies any sore throat.  Fluid noted behind bilateral TMs on exam.  She is also requesting to restart ADHD medications.  She was previously taking Adderall XR 30, but stopped taking this due to trying to get back in the .  She states she is no longer trying to do this and would like to restart her meds now.    Plan:    1.  Restart Adderall XR 30 pending clean urine drug screen.  Reji is clean.  2.  Respiratory panel swab collected.  3.  Steroid injection given in office.  Patient would like to wait to start antibiotics until respiratory panel results are available.  4.  Recommended to use Flonase nasal spray.  Will send a prescription at this time.  5.  Follow-up 3 months for ADHD follow-up.      ADHD Follow up:    PDMP reviewed and is clean. ADRS (Adult ADHD Assessment Form) reviewed in detail, scanned in chart, and has been reviewed at time of appointment.  All questions, including medication and side effects, were discussed in detail at time of patient's visit. Patient will continue same medication which was discussed at today's visit. Patient is to return in 3 month(s).    Last Urine Toxicity  More data may exist         Latest Ref Rng & Units 11/28/2022 4/5/2021   LAST URINE TOXICITY RESULTS   Amphetamine, Urine Qual Negative Positive  Positive    Barbiturates Screen, Urine Negative Negative  Negative    Benzodiazepine Screen, Urine Negative Negative  Negative    Buprenorphine, Screen, Urine Negative Negative  Negative    Cocaine Screen, Urine Negative Negative  Negative    Methadone Screen , Urine Negative Negative  Negative    Methamphetamine, Ur Negative Negative  Negative         Urine Drug Screen Results: UDS RESULTS: Updated results needed      Follow Up   Return in about 3 months (around 3/4/2024) for Recheck.  Patient was given instructions and counseling regarding her condition or for health maintenance advice. Please see specific information pulled into the AVS if appropriate.

## 2023-12-04 NOTE — PROGRESS NOTES
"Chief Complaint  Fever, Chills, Headache, Dizziness, Nausea, Generalized Body Aches, and Earache    Subjective    History of Present Illness      Patient presents to [facility] for   History of Present Illness     Review of Systems    I have reviewed and agree with the information as above.  Miranda aGrcia RN     Objective   Vital Signs:   /65   Pulse 85   Temp 98.2 °F (36.8 °C) (Infrared)   Ht 160 cm (62.99\")   Wt 50.7 kg (111 lb 12.8 oz)   SpO2 99%   BMI 19.81 kg/m²     BMI is within normal parameters. No other follow-up for BMI required.      Physical Exam     HARSHAL-7:      PHQ-2 Depression Screening  Little interest or pleasure in doing things?     Feeling down, depressed, or hopeless?     PHQ-2 Total Score       PHQ-9 Depression Screening  Little interest or pleasure in doing things?     Feeling down, depressed, or hopeless?     Trouble falling or staying asleep, or sleeping too much?     Feeling tired or having little energy?     Poor appetite or overeating?     Feeling bad about yourself - or that you are a failure or have let yourself or your family down?     Trouble concentrating on things, such as reading the newspaper or watching television?     Moving or speaking so slowly that other people could have noticed? Or the opposite - being so fidgety or restless that you have been moving around a lot more than usual?     Thoughts that you would be better off dead, or of hurting yourself in some way?     PHQ-9 Total Score     If you checked off any problems, how difficult have these problems made it for you to do your work, take care of things at home, or get along with other people?        Result Review  Data Reviewed:                  Assessment and Plan      Diagnoses and all orders for this visit:    1. Attention deficit hyperactivity disorder (ADHD), combined type (Primary)    2. Fluid level behind tympanic membrane of both ears  -     dexAMETHasone (DECADRON) " injection 8 mg  -     fluticasone (FLONASE) 50 MCG/ACT nasal spray; 2 sprays into the nostril(s) as directed by provider Daily.  Dispense: 18.2 mL; Refill: 2    3. Fever, unspecified fever cause  -     Respiratory Panel PCR w/COVID-19(SARS-CoV-2) DANA/CELIA/AIDE/PAD/COR/MIROSLAVA In-House, NP Swab in UTM/VTM, 2 HR TAT - Swab, Nasopharynx; Future    4. Medication monitoring encounter  -     Urine Drug Screen - Urine, Clean Catch; Future            Follow Up   Return in about 3 months (around 3/4/2024) for Recheck.  Patient was given instructions and counseling regarding her condition or for health maintenance advice. Please see specific information pulled into the AVS if appropriate.

## 2023-12-05 ENCOUNTER — TELEPHONE (OUTPATIENT)
Dept: FAMILY MEDICINE CLINIC | Facility: CLINIC | Age: 33
End: 2023-12-05
Payer: COMMERCIAL

## 2023-12-05 LAB
B PARAPERT DNA SPEC QL NAA+PROBE: NOT DETECTED
B PERT DNA SPEC QL NAA+PROBE: NOT DETECTED
C PNEUM DNA NPH QL NAA+NON-PROBE: NOT DETECTED
FENTANYL UR-MCNC: NEGATIVE NG/ML
FLUAV SUBTYP SPEC NAA+PROBE: NOT DETECTED
FLUBV RNA ISLT QL NAA+PROBE: NOT DETECTED
HADV DNA SPEC NAA+PROBE: NOT DETECTED
HCOV 229E RNA SPEC QL NAA+PROBE: NOT DETECTED
HCOV HKU1 RNA SPEC QL NAA+PROBE: NOT DETECTED
HCOV NL63 RNA SPEC QL NAA+PROBE: NOT DETECTED
HCOV OC43 RNA SPEC QL NAA+PROBE: NOT DETECTED
HMPV RNA NPH QL NAA+NON-PROBE: NOT DETECTED
HPIV1 RNA ISLT QL NAA+PROBE: NOT DETECTED
HPIV2 RNA SPEC QL NAA+PROBE: NOT DETECTED
HPIV3 RNA NPH QL NAA+PROBE: NOT DETECTED
HPIV4 P GENE NPH QL NAA+PROBE: NOT DETECTED
M PNEUMO IGG SER IA-ACNC: NOT DETECTED
RHINOVIRUS RNA SPEC NAA+PROBE: NOT DETECTED
RSV RNA NPH QL NAA+NON-PROBE: NOT DETECTED
SARS-COV-2 RNA NPH QL NAA+NON-PROBE: NOT DETECTED

## 2023-12-05 NOTE — TELEPHONE ENCOUNTER
----- Message from NAEEM Funes sent at 12/5/2023  1:54 PM CST -----  Negative respiratory  panel.

## 2024-01-14 ENCOUNTER — APPOINTMENT (OUTPATIENT)
Dept: CT IMAGING | Facility: HOSPITAL | Age: 34
End: 2024-01-14
Payer: COMMERCIAL

## 2024-01-14 ENCOUNTER — HOSPITAL ENCOUNTER (EMERGENCY)
Facility: HOSPITAL | Age: 34
Discharge: HOME | End: 2024-01-14
Admitting: INTERNAL MEDICINE
Payer: COMMERCIAL

## 2024-01-14 ENCOUNTER — NURSE TRIAGE (OUTPATIENT)
Dept: CALL CENTER | Facility: HOSPITAL | Age: 34
End: 2024-01-14
Payer: COMMERCIAL

## 2024-01-14 VITALS
RESPIRATION RATE: 18 BRPM | HEART RATE: 86 BPM | OXYGEN SATURATION: 97 % | SYSTOLIC BLOOD PRESSURE: 113 MMHG | DIASTOLIC BLOOD PRESSURE: 90 MMHG | WEIGHT: 109 LBS | TEMPERATURE: 98 F | HEIGHT: 62 IN | BODY MASS INDEX: 20.06 KG/M2

## 2024-01-14 DIAGNOSIS — M54.2 NECK PAIN: Primary | ICD-10-CM

## 2024-01-14 PROCEDURE — 96372 THER/PROPH/DIAG INJ SC/IM: CPT

## 2024-01-14 PROCEDURE — 25010000002 DEXAMETHASONE PER 1 MG

## 2024-01-14 PROCEDURE — 72125 CT NECK SPINE W/O DYE: CPT

## 2024-01-14 PROCEDURE — 99284 EMERGENCY DEPT VISIT MOD MDM: CPT

## 2024-01-14 RX ORDER — DEXAMETHASONE SODIUM PHOSPHATE 10 MG/ML
10 INJECTION INTRAMUSCULAR; INTRAVENOUS ONCE
Status: COMPLETED | OUTPATIENT
Start: 2024-01-14 | End: 2024-01-14

## 2024-01-14 RX ORDER — CYCLOBENZAPRINE HCL 5 MG
5 TABLET ORAL 3 TIMES DAILY PRN
Qty: 12 TABLET | Refills: 0 | Status: SHIPPED | OUTPATIENT
Start: 2024-01-14 | End: 2024-01-26

## 2024-01-14 RX ORDER — KETOROLAC TROMETHAMINE 10 MG/1
10 TABLET, FILM COATED ORAL ONCE
Status: COMPLETED | OUTPATIENT
Start: 2024-01-14 | End: 2024-01-14

## 2024-01-14 RX ORDER — METHYLPREDNISOLONE 4 MG/1
TABLET ORAL
Qty: 21 TABLET | Refills: 0 | Status: SHIPPED | OUTPATIENT
Start: 2024-01-14

## 2024-01-14 RX ORDER — CYCLOBENZAPRINE HCL 10 MG
10 TABLET ORAL ONCE
Status: COMPLETED | OUTPATIENT
Start: 2024-01-14 | End: 2024-01-14

## 2024-01-14 RX ADMIN — DEXAMETHASONE SODIUM PHOSPHATE 10 MG: 10 INJECTION INTRAMUSCULAR; INTRAVENOUS at 18:24

## 2024-01-14 RX ADMIN — CYCLOBENZAPRINE 10 MG: 10 TABLET, FILM COATED ORAL at 18:24

## 2024-01-14 RX ADMIN — KETOROLAC TROMETHAMINE 10 MG: 10 TABLET, FILM COATED ORAL at 18:24

## 2024-01-14 NOTE — TELEPHONE ENCOUNTER
"Right neck, shoulder, and arm stiffness. Right 4th and 5th digits go numb. Started 3-4 days ago. Taking ibuprofen and Flexeril without relief. Barely able to move neck. Unable to put chin to chest. Reviewed protocol with patient. Advised to go to ER. Patient verbalized agreement with plan.    Reason for Disposition   [1] Stiff neck (can't put chin to chest) AND [2] headache    Additional Information   Negative: Shock suspected (e.g., cold/pale/clammy skin, too weak to stand, low BP, rapid pulse)   Negative: Difficult to awaken or acting confused (e.g., disoriented, slurred speech)   Negative: [1] Similar pain previously AND [2] it was from \"heart attack\"   Negative: [1] Similar pain previously AND [2] it was from \"angina\" AND [3] not relieved by nitroglycerin   Negative: Sounds like a life-threatening emergency to the triager   Negative: Followed a neck injury (e.g., MVA, sports, impact or collision)   Negative: Chest pain   Negative: Lymph node in the neck is swollen or painful to the touch   Negative: Sore throat is main symptom   Negative: Difficulty breathing or unusual sweating (e.g., sweating without exertion)    Answer Assessment - Initial Assessment Questions  1. ONSET: \"When did the pain begin?\"       4 days ago  2. LOCATION: \"Where does it hurt?\"       Right neck, right shoulder, right arm  3. PATTERN \"Does the pain come and go, or has it been constant since it started?\"       Constant   4. SEVERITY: \"How bad is the pain?\"  (Scale 1-10; or mild, moderate, severe)    - NO PAIN (0): no pain or only slight stiffness     - MILD (1-3): doesn't interfere with normal activities     - MODERATE (4-7): interferes with normal activities or awakens from sleep     - SEVERE (8-10):  excruciating pain, unable to do any normal activities       10/10 with movement, 6/10 while completely still  5. RADIATION: \"Does the pain go anywhere else, shoot into your arms?\"      Right shoulder and arm  6. CORD SYMPTOMS: \"Any weakness " "or numbness of the arms or legs?\"      Numbness right 4th and 5th digits  7. CAUSE: \"What do you think is causing the neck pain?\"      Unknown   8. NECK OVERUSE: \"Any recent activities that involved turning or twisting the neck?\"      No   9. OTHER SYMPTOMS: \"Do you have any other symptoms?\" (e.g., headache, fever, chest pain, difficulty breathing, neck swelling)      Occasional headaches, right side chest pain with deep breath  10. PREGNANCY: \"Is there any chance you are pregnant?\" \"When was your last menstrual period?\"        No    Protocols used: Neck Pain or Stiffness-ADULT-AH    "

## 2024-01-14 NOTE — Clinical Note
[facility] EMERGENCY DEPARTMENT  [address]  [facility] [address]  Phone: [phone]    Laura Morales was seen and treated in our emergency department on 1/14/2024.  She may return to work on 01/16/2024.         Thank you for choosing [facility].    Kristina Banuelos APRN

## 2024-01-15 NOTE — ED PROVIDER NOTES
Subjective   History of Present Illness  Patient is a 33-year-old female who presents emergency department with complaints of neck pain.  She states that neck pain started on Wednesday.  She developed a headache on Monday.  She feels like she cannot move her neck or her right shoulder.  The pain starts on the right side of her neck that goes down to her right shoulder blade.  She feels like the pain is getting worse.  She has taken Flexeril at home, however it is not relieving her pain.  Patient denies any injury or trauma.  She feels like when she moves her arm a certain way her fourth and fifth digit go numb.  She describes it as a burning sensation.  She denies fevers.        Review of Systems   Musculoskeletal:  Positive for neck pain.   All other systems reviewed and are negative.      Past Medical History:   Diagnosis Date    ADD (attention deficit disorder)     Fracture of rib of left side     Hypertension     Seasonal allergies     UTI (urinary tract infection)        Allergies   Allergen Reactions    Acetaminophen-Codeine Hives    Codeine Rash       Past Surgical History:   Procedure Laterality Date    BREAST SURGERY      AUGMENTATION    BREAST SURGERY      REDO OF PREVIOUS SURGERY    CYST REMOVAL      LEEP N/A 7/27/2023    Procedure: LOOP ELECTROSURGERY EXCISION PROCEDURE;  Surgeon: Lissett Belle MD;  Location: [facility] OR;  Service: Obstetrics/Gynecology;  Laterality: N/A;    SEPTOPLASTY, RESECTION INFERIOR TURBINATES Bilateral 03/18/2021    Procedure: septoplasty with turbinate reduction;  Surgeon: Mart Betts Jr., MD;  Location: [facility] OR;  Service: ENT;  Laterality: Bilateral;       Family History   Problem Relation Age of Onset    Gout Father     Cancer Maternal Grandmother         breast    Diabetes Maternal Grandmother        Social History     Socioeconomic History    Marital status: Single   Tobacco Use    Smoking status: Former     Packs/day: 0.25     Years: 10.00      Additional pack years: 0.00     Total pack years: 2.50     Types: Cigarettes     Quit date: 2012     Years since quittin.3     Passive exposure: Past    Smokeless tobacco: Never   Vaping Use    Vaping Use: Never used   Substance and Sexual Activity    Alcohol use: Not Currently     Alcohol/week: 1.0 standard drink of alcohol     Types: 1 Glasses of wine per week    Drug use: No    Sexual activity: Defer           Objective   Physical Exam  Vitals and nursing note reviewed.   Constitutional:       General: She is not in acute distress.     Appearance: Normal appearance. She is normal weight. She is not ill-appearing or toxic-appearing.   HENT:      Head: Normocephalic.   Cardiovascular:      Rate and Rhythm: Normal rate.   Pulmonary:      Effort: Pulmonary effort is normal.   Abdominal:      General: Abdomen is flat.   Musculoskeletal:         General: Normal range of motion.      Cervical back: Normal range of motion. Tenderness (Right-sided neck tenderness) present. No rigidity.   Skin:     General: Skin is warm and dry.   Neurological:      General: No focal deficit present.      Mental Status: She is alert and oriented to person, place, and time. Mental status is at baseline.      GCS: GCS eye subscore is 4. GCS verbal subscore is 5. GCS motor subscore is 6.      Sensory: Sensation is intact.   Psychiatric:         Mood and Affect: Mood normal.         Behavior: Behavior normal.         Thought Content: Thought content normal.         Judgment: Judgment normal.         Procedures           ED Course  ED Course as of 24   Sun  Case signed out to NAEEM Cruz.  CT pending at this time. [KR]      ED Course User Index  [KR] Maria L Lundberg APRN                                             Medical Decision Making  I took over patient care from NAEEM Jung, she had completed HPr review of system and physical exam prior to that.  It was pending CT cervical spine.  CT showed  no concerning findings.  I did reevaluate patient patient states that she has not had pain like this before in the past and that steroids and Flexeril did help.  Patient has not had any fever cough chills or any other signs of illness recently.  Patient to discharge home on steroid and Flexeril and follow-up with primary care if pain does not improve she may need referral for neurosurgery.    Problems Addressed:  Neck pain: complicated acute illness or injury    Amount and/or Complexity of Data Reviewed  Radiology: ordered.    Risk  Prescription drug management.        Final diagnoses:   Neck pain       ED Disposition  ED Disposition       ED Disposition   Discharge    Condition   Stable    Comment   --               Carlin Amado  [address] [place] [id number]  [phone]    Schedule an appointment as soon as possible for a visit       [facility] EMERGENCY DEPARTMENT  [address] [place] [id number] [phone]    If symptoms worsen         Medication List        New Prescriptions      cyclobenzaprine 5 MG tablet  Commonly known as: FLEXERIL  Take 1 tablet by mouth 3 (Three) Times a Day As Needed for Muscle Spasms for up to 12 days.     methylPREDNISolone 4 MG dose pack  Commonly known as: MEDROL  Take as directed on package instructions.               Where to Get Your Medications        These medications were sent to [facility] - [place], [place] - [address] - [phone] [facility] [phone] FX  [address], [facility] [address]      Phone: [phone]   cyclobenzaprine 5 MG tablet  methylPREDNISolone 4 MG dose pack            Kristina Banuelos APRN  01/14/24 2024

## 2024-02-07 DIAGNOSIS — F90.2 ATTENTION DEFICIT HYPERACTIVITY DISORDER (ADHD), COMBINED TYPE: ICD-10-CM

## 2024-02-08 RX ORDER — DEXTROAMPHETAMINE SULFATE, DEXTROAMPHETAMINE SACCHARATE, AMPHETAMINE SULFATE AND AMPHETAMINE ASPARTATE 7.5; 7.5; 7.5; 7.5 MG/1; MG/1; MG/1; MG/1
30 CAPSULE, EXTENDED RELEASE ORAL EVERY MORNING
Qty: 30 CAPSULE | Refills: 0 | Status: SHIPPED | OUTPATIENT
Start: 2024-02-08

## 2024-02-08 RX ORDER — DEXTROAMPHETAMINE SULFATE, DEXTROAMPHETAMINE SACCHARATE, AMPHETAMINE SULFATE AND AMPHETAMINE ASPARTATE 7.5; 7.5; 7.5; 7.5 MG/1; MG/1; MG/1; MG/1
CAPSULE, EXTENDED RELEASE ORAL
Qty: 30 CAPSULE | Refills: 0 | OUTPATIENT
Start: 2024-02-08

## 2024-03-21 ENCOUNTER — TELEMEDICINE (OUTPATIENT)
Dept: FAMILY MEDICINE CLINIC | Facility: CLINIC | Age: 34
End: 2024-03-21
Payer: COMMERCIAL

## 2024-03-21 VITALS — WEIGHT: 109 LBS | HEIGHT: 62 IN | BODY MASS INDEX: 20.06 KG/M2

## 2024-03-21 DIAGNOSIS — F90.2 ATTENTION DEFICIT HYPERACTIVITY DISORDER (ADHD), COMBINED TYPE: Primary | ICD-10-CM

## 2024-03-21 DIAGNOSIS — G47.09 OTHER INSOMNIA: ICD-10-CM

## 2024-03-21 PROCEDURE — 99213 OFFICE O/P EST LOW 20 MIN: CPT | Performed by: NURSE PRACTITIONER

## 2024-03-21 RX ORDER — DEXTROAMPHETAMINE SACCHARATE, AMPHETAMINE ASPARTATE MONOHYDRATE, DEXTROAMPHETAMINE SULFATE AND AMPHETAMINE SULFATE 7.5; 7.5; 7.5; 7.5 MG/1; MG/1; MG/1; MG/1
30 CAPSULE, EXTENDED RELEASE ORAL EVERY MORNING
Qty: 30 CAPSULE | Refills: 0 | Status: SHIPPED | OUTPATIENT
Start: 2024-04-18 | End: 2024-05-18

## 2024-03-21 RX ORDER — CLONIDINE HYDROCHLORIDE 0.1 MG/1
0.1 TABLET ORAL 3 TIMES DAILY PRN
Qty: 90 TABLET | Refills: 0 | Status: SHIPPED | OUTPATIENT
Start: 2024-03-21

## 2024-03-21 RX ORDER — DEXTROAMPHETAMINE SACCHARATE, AMPHETAMINE ASPARTATE MONOHYDRATE, DEXTROAMPHETAMINE SULFATE AND AMPHETAMINE SULFATE 7.5; 7.5; 7.5; 7.5 MG/1; MG/1; MG/1; MG/1
30 CAPSULE, EXTENDED RELEASE ORAL EVERY MORNING
Qty: 30 CAPSULE | Refills: 0 | Status: SHIPPED | OUTPATIENT
Start: 2024-03-21 | End: 2024-04-20

## 2024-03-21 NOTE — PROGRESS NOTES
"You have chosen to receive care through a telehealth visit.  Do you consent to use a video/audio connection for your medical care today? Yes   This visit was completed by Telehealth using a computer. The location of the provider is [address] [place] [address]. The location of the patient is currently at home.    Chief Complaint  ADHD    Subjective    History of Present Illness      Patient presents to [facility] for   History of Present Illness  Patient is being seen via telehealth today for an ADHD follow-up.  She states that she is doing well and does not have any concerns at this time.       Objective   Vital Signs:   Ht 157.5 cm (62\")   Wt 49.4 kg (109 lb)   BMI 19.94 kg/m²       Physical Exam  Vitals and nursing note reviewed.   Constitutional:       Appearance: Normal appearance. She is well-developed.   HENT:      Head: Normocephalic and atraumatic.      Mouth/Throat:      Lips: Pink. No lesions.   Eyes:      General: Lids are normal. Vision grossly intact.      Conjunctiva/sclera: Conjunctivae normal.      Right eye: Right conjunctiva is not injected.      Left eye: Left conjunctiva is not injected.   Pulmonary:      Effort: Pulmonary effort is normal.   Musculoskeletal:         General: Normal range of motion.      Cervical back: Full passive range of motion without pain, normal range of motion and neck supple.   Skin:     General: Skin is dry.   Neurological:      Mental Status: She is alert and oriented to person, place, and time.      Motor: Motor function is intact.   Psychiatric:         Mood and Affect: Mood and affect normal.         Judgment: Judgment normal.          Result Review  Data Reviewed:                   Assessment and Plan    Problem List Items Addressed This Visit       Attention deficit hyperactivity disorder (ADHD), combined type - Primary    Other insomnia    Relevant Medications    cloNIDine (CATAPRES) 0.1 MG tablet     Patient is being seen " via telehealth today for an ADHD follow-up.  She states that she is doing well and does not have any concerns at this time.    ADHD Follow up:    PDMP reviewed and is clean. ADRS (Adult ADHD Assessment Form) reviewed in detail, scanned in chart, and has been reviewed at time of appointment.  All questions, including medication and side effects, were discussed in detail at time of patient's visit. Patient will continue same medication which was discussed at today's visit. Patient is to return in 3 month(s).    Last Urine Toxicity  More data exists         Latest Ref Rng & Units 12/4/2023 11/28/2022   LAST URINE TOXICITY RESULTS   Amphetamine, Urine Qual Negative Negative  Positive    Barbiturates Screen, Urine Negative Negative  Negative    Benzodiazepine Screen, Urine Negative Negative  Negative    Buprenorphine, Screen, Urine Negative Negative  Negative    Cocaine Screen, Urine Negative Negative  Negative    Fentanyl, Urine Negative Negative  -   Methadone Screen , Urine Negative Negative  Negative    Methamphetamine, Ur Negative Negative  Negative         Urine Drug Screen Results: UDS RESULTS: Current results appropriate    CSA completed on: 11/28/22- will need update at next in office visit          Follow Up   Return in about 3 months (around 6/21/2024) for ADHD follow up.  Patient was given instructions and counseling regarding her condition or for health maintenance advice. Please see specific information pulled into the AVS if appropriate.

## 2024-04-07 ENCOUNTER — NURSE TRIAGE (OUTPATIENT)
Dept: CALL CENTER | Facility: HOSPITAL | Age: 34
End: 2024-04-07
Payer: COMMERCIAL

## 2024-04-07 NOTE — TELEPHONE ENCOUNTER
"Needs ENT consult. Would like to see Alpesh again. For past year, ocean sounds in both ears and mostly in left right now. Driving her crazy.  Reason for Disposition   [1] Caller requesting NON-URGENT health information AND [2] PCP's office is the best resource    Additional Information   Negative: [1] Caller is not with the adult (patient) AND [2] reporting urgent symptoms   Negative: Lab result questions   Negative: Medication questions   Negative: Caller can't be reached by phone   Negative: Caller has already spoken to PCP or another triager   Negative: RN needs further essential information from caller in order to complete triage   Negative: Requesting regular office appointment   Negative: Health Information question, no triage required and triager able to answer question    Answer Assessment - Initial Assessment Questions  1. REASON FOR CALL or QUESTION: \"What is your reason for calling today?\" or \"How can I best help you?\" or \"What question do you have that I can help answer?\"      Needs ENT consult. For past year, ocean sounds in both ears and mostly in left right now. Driving her crazy.    Protocols used: Information Only Call-ADULT-    "

## 2024-04-08 ENCOUNTER — TELEPHONE (OUTPATIENT)
Dept: OTOLARYNGOLOGY | Facility: CLINIC | Age: 34
End: 2024-04-08
Payer: COMMERCIAL

## 2024-04-08 NOTE — TELEPHONE ENCOUNTER
Spoke with patient about appointment request, advised patient that she will need a new referral since it has been 3 years since her last appointment. Patient sates that she will contact her PCP

## 2024-04-16 DIAGNOSIS — H93.13 TINNITUS, BILATERAL: Primary | ICD-10-CM

## 2024-05-21 ENCOUNTER — TELEPHONE (OUTPATIENT)
Dept: FAMILY MEDICINE CLINIC | Facility: CLINIC | Age: 34
End: 2024-05-21
Payer: COMMERCIAL

## 2024-05-21 PROCEDURE — 87077 CULTURE AEROBIC IDENTIFY: CPT | Performed by: NURSE PRACTITIONER

## 2024-05-21 PROCEDURE — 87186 SC STD MICRODIL/AGAR DIL: CPT | Performed by: NURSE PRACTITIONER

## 2024-05-21 PROCEDURE — 87086 URINE CULTURE/COLONY COUNT: CPT | Performed by: NURSE PRACTITIONER

## 2024-05-21 NOTE — TELEPHONE ENCOUNTER
HUB TO RELAY AND PLEASE SCHEDULE APPTS FOR ACUTE NEEDS: PT NEEDS TO BE SCHEDULED FOR APPT TO BE SEEN FOR THESE SYMPTOMS    LEFT MESSAGE FOR PATIENT TO CALLBACK

## 2024-05-21 NOTE — TELEPHONE ENCOUNTER
Caller: Laura Morales    Relationship: Self    Best call back number: [phone]    What medication are you requesting: PROVIDERS SUGGESTION    What are your current symptoms: ODD ODOR IN URINE, URGE TO URINATE, NOTHING HAPPENS.    How long have you been experiencing symptoms: A COUPLE OF DAYS    Have you had these symptoms before:    [x] Yes  [] No    Have you been treated for these symptoms before:   [x] Yes  [] No    If a prescription is needed, what is your preferred pharmacy and phone number: [facility] - [place], [place] - [address] - [phone]  - [phone] FX

## 2024-05-22 ENCOUNTER — TELEPHONE (OUTPATIENT)
Dept: FAMILY MEDICINE CLINIC | Facility: CLINIC | Age: 34
End: 2024-05-22
Payer: COMMERCIAL

## 2024-05-22 ENCOUNTER — PATIENT ROUNDING (BHMG ONLY) (OUTPATIENT)
Dept: URGENT CARE | Facility: CLINIC | Age: 34
End: 2024-05-22
Payer: COMMERCIAL

## 2024-05-22 NOTE — ED NOTES
Thank you for letting us care for you in your recent visit to our urgent care center. We would love to hear about your experience with us. Was this the first time you have visited our location?    We’re always looking for ways to make our patients’ experiences even better. Do you have any recommendations on ways we may improve?     I appreciate you taking the time to respond. Please be on the lookout for a survey about your recent visit from [employer] via text or email. We would greatly appreciate if you could fill that out and turn it back in. We want your voice to be heard and we value your feedback.   Thank you for choosing [facility] for your healthcare needs.

## 2024-05-23 DIAGNOSIS — F90.2 ATTENTION DEFICIT HYPERACTIVITY DISORDER (ADHD), COMBINED TYPE: ICD-10-CM

## 2024-05-23 NOTE — TELEPHONE ENCOUNTER
"Pt called in, I verified name and  and asked how I could help her.  She stated she had a vm left about a missed apt yesterday.   She had called out office on 24 and needed to be seen for UTI symptoms. We could not get her in until next day 24. She got worse during day Tues and went to Urgent Care.   She tried to cancel the apt on  while she was a UC but it will no longer let you cancel on , you have to call to cancel.  Wednesday morning she started called at 7:30 am to cancel the apt with us. She said the first time she called nobody answered the phone. The second time she called, after a few words, she was disconnected. She was making these calls from work and the third call, she finally talked to someone that said, \"We got you, I will have this apt cancelled for you.\"  Then yesterday afternoon she got a call about no showing the apt.   I explained the policy to her and apologized to her for all the trouble she had. I told her it would be a couple days before I could get the no show off her chart, but I would get that done. I then went to the mail since it had not been picked up yet and pulled the no show letter that was being mailed to her.   I told her she can always ask to speak to us here in the office if she has problems. She stated she stopped asking for the office because we were rude. I said I am not rude. She then said, oh you are in the office. I said yes and again, I apologize if that has been your experience in the past but I promise you I will get this taken care of for you. This will not be held against you.   She VU and thanked me for being nice and helping her with this.  "

## 2024-05-24 RX ORDER — DEXTROAMPHETAMINE SULFATE, DEXTROAMPHETAMINE SACCHARATE, AMPHETAMINE SULFATE AND AMPHETAMINE ASPARTATE 7.5; 7.5; 7.5; 7.5 MG/1; MG/1; MG/1; MG/1
30 CAPSULE, EXTENDED RELEASE ORAL EVERY MORNING
Qty: 30 CAPSULE | Refills: 0 | Status: SHIPPED | OUTPATIENT
Start: 2024-05-24

## 2024-05-24 NOTE — TELEPHONE ENCOUNTER
Rx Refill Note  Requested Prescriptions     Pending Prescriptions Disp Refills    Adderall XR 30 MG 24 hr capsule [Pharmacy Med Name: ADDERALL XR 30 MG CAPSULE] 30 capsule 0     Sig: TAKE 1 CAPSULE BY MOUTH EVERY MORNING.      Last office visit with prescribing clinician: Visit date not found   Last telemedicine visit with prescribing clinician: 3/21/2024   Next office visit with prescribing clinician: Visit date not found     Refill: 3rd  Date of last script: 4/18/24  GABRIELLA Schmitz MA  05/24/24, 09:21 CDT

## 2024-06-20 ENCOUNTER — TELEPHONE (OUTPATIENT)
Dept: OTOLARYNGOLOGY | Facility: CLINIC | Age: 34
End: 2024-06-20

## 2024-06-20 NOTE — TELEPHONE ENCOUNTER
Left message at 09:08am this morning on pt's phone that we are having to cancel the hearing test only due to audiologist  being out.

## 2024-06-27 DIAGNOSIS — H65.93 FLUID LEVEL BEHIND TYMPANIC MEMBRANE OF BOTH EARS: ICD-10-CM

## 2024-06-27 DIAGNOSIS — F90.2 ATTENTION DEFICIT HYPERACTIVITY DISORDER (ADHD), COMBINED TYPE: ICD-10-CM

## 2024-06-27 RX ORDER — VALACYCLOVIR HYDROCHLORIDE 500 MG/1
TABLET, FILM COATED ORAL DAILY
OUTPATIENT
Start: 2024-06-27

## 2024-06-27 RX ORDER — FLUTICASONE PROPIONATE 50 MCG
2 SPRAY, SUSPENSION (ML) NASAL DAILY
Qty: 18.2 ML | Refills: 2 | OUTPATIENT
Start: 2024-06-27

## 2024-06-27 RX ORDER — DEXTROAMPHETAMINE SACCHARATE, AMPHETAMINE ASPARTATE MONOHYDRATE, DEXTROAMPHETAMINE SULFATE AND AMPHETAMINE SULFATE 7.5; 7.5; 7.5; 7.5 MG/1; MG/1; MG/1; MG/1
30 CAPSULE, EXTENDED RELEASE ORAL EVERY MORNING
Qty: 30 CAPSULE | Refills: 0 | OUTPATIENT
Start: 2024-06-27

## 2024-06-27 NOTE — TELEPHONE ENCOUNTER
HUB TO READ  Patient due for 3 month follow up. Will need to be in person this time. [employer] message sent to patient informing of this.

## 2024-06-27 NOTE — TELEPHONE ENCOUNTER
HUB TO READ  Patient due for 3 month follow up. Will need to be in person this time. [employer] message sent to patient informing of this.

## 2024-06-27 NOTE — TELEPHONE ENCOUNTER
HUB TO READ  Patient due for 3 month follow up. Will need to be in person this time. [employer] message sent to patient informing of this.

## 2024-07-08 ENCOUNTER — OFFICE VISIT (OUTPATIENT)
Age: 34
End: 2024-07-08
Payer: COMMERCIAL

## 2024-07-08 VITALS
SYSTOLIC BLOOD PRESSURE: 98 MMHG | HEIGHT: 62 IN | BODY MASS INDEX: 19.14 KG/M2 | DIASTOLIC BLOOD PRESSURE: 62 MMHG | WEIGHT: 104 LBS

## 2024-07-08 DIAGNOSIS — Z13.1 SCREENING FOR DIABETES MELLITUS: ICD-10-CM

## 2024-07-08 DIAGNOSIS — Z01.419 WELL WOMAN EXAM WITH ROUTINE GYNECOLOGICAL EXAM: Primary | ICD-10-CM

## 2024-07-08 DIAGNOSIS — Z13.29 SCREENING FOR THYROID DISORDER: ICD-10-CM

## 2024-07-08 DIAGNOSIS — Z78.9 NON-SMOKER: ICD-10-CM

## 2024-07-08 DIAGNOSIS — Z13.21 ENCOUNTER FOR VITAMIN DEFICIENCY SCREENING: ICD-10-CM

## 2024-07-08 DIAGNOSIS — Z13.220 SCREENING FOR LIPID DISORDERS: ICD-10-CM

## 2024-07-08 DIAGNOSIS — Z12.4 ENCOUNTER FOR SCREENING FOR CERVICAL CANCER: ICD-10-CM

## 2024-07-08 DIAGNOSIS — Z13.0 SCREENING FOR IRON DEFICIENCY ANEMIA: ICD-10-CM

## 2024-07-08 PROCEDURE — 87661 TRICHOMONAS VAGINALIS AMPLIF: CPT | Performed by: OBSTETRICS & GYNECOLOGY

## 2024-07-08 PROCEDURE — 99385 PREV VISIT NEW AGE 18-39: CPT | Performed by: OBSTETRICS & GYNECOLOGY

## 2024-07-08 PROCEDURE — 2014F MENTAL STATUS ASSESS: CPT | Performed by: OBSTETRICS & GYNECOLOGY

## 2024-07-08 PROCEDURE — 87491 CHLMYD TRACH DNA AMP PROBE: CPT | Performed by: OBSTETRICS & GYNECOLOGY

## 2024-07-08 PROCEDURE — 87591 N.GONORRHOEAE DNA AMP PROB: CPT | Performed by: OBSTETRICS & GYNECOLOGY

## 2024-07-08 PROCEDURE — G0123 SCREEN CERV/VAG THIN LAYER: HCPCS | Performed by: OBSTETRICS & GYNECOLOGY

## 2024-07-08 PROCEDURE — 87624 HPV HI-RISK TYP POOLED RSLT: CPT | Performed by: OBSTETRICS & GYNECOLOGY

## 2024-07-08 PROCEDURE — 99459 PELVIC EXAMINATION: CPT | Performed by: OBSTETRICS & GYNECOLOGY

## 2024-07-08 RX ORDER — VALACYCLOVIR HYDROCHLORIDE 500 MG/1
500 TABLET, FILM COATED ORAL DAILY
Qty: 30 TABLET | Refills: 12 | Status: SHIPPED | OUTPATIENT
Start: 2024-07-08

## 2024-07-08 NOTE — PROGRESS NOTES
"Chief Complaint  Gynecologic Exam (Pt here as new gyn transfer from Dr. Belle for annual and to f/u on abnormal pap, s/p LEEP 07/27/2023, last pap 02/19/2024 LGSIL/+HPV)    Subjective        Laura Morales presents to [facility]  History of Present Illness    Patient presents today for yearly exam  Self breast exam, diet, exercise, multivitamin use all discussed  All of her questions were answered to her liking  ASCCP guidelines were reviewed for Pap smear surveillance  She is due for her Pap smear     is status post vasectomy  She had a LEEP conization 1 year ago showing mild dysplasia with negative margins  Pap smear after LEEP was normal        Objective   Vital Signs:  BP 98/62 (BP Location: Left arm, Patient Position: Sitting, Cuff Size: Adult)   Ht 157.5 cm (62\")   Wt 47.2 kg (104 lb)   BMI 19.02 kg/m²   Estimated body mass index is 19.02 kg/m² as calculated from the following:    Height as of this encounter: 157.5 cm (62\").    Weight as of this encounter: 47.2 kg (104 lb).       BMI is within normal parameters. No other follow-up for BMI required.      Physical Exam  Vitals and nursing note reviewed. Exam conducted with a chaperone present.   Constitutional:       Appearance: Normal appearance.   HENT:      Head: Normocephalic and atraumatic.      Mouth/Throat:      Mouth: Mucous membranes are moist.   Eyes:      Extraocular Movements: Extraocular movements intact.      Pupils: Pupils are equal, round, and reactive to light.   Neck:      Vascular: No carotid bruit.   Cardiovascular:      Rate and Rhythm: Normal rate and regular rhythm.      Pulses: Normal pulses.      Heart sounds: Normal heart sounds. No murmur heard.     No friction rub. No gallop.   Pulmonary:      Effort: Pulmonary effort is normal. No respiratory distress.      Breath sounds: Normal breath sounds. No stridor. No wheezing, rhonchi or rales.   Chest:      Chest wall: No tenderness.   Breasts:     Breasts " are symmetrical.      Right: Normal. No swelling, bleeding, inverted nipple, mass, nipple discharge, skin change or tenderness.      Left: Normal. No swelling, bleeding, inverted nipple, mass, nipple discharge, skin change or tenderness.   Abdominal:      General: Abdomen is flat. Bowel sounds are normal. There is no distension.      Palpations: Abdomen is soft. There is no mass.      Tenderness: There is no abdominal tenderness. There is no right CVA tenderness, left CVA tenderness, guarding or rebound.      Hernia: No hernia is present. There is no hernia in the left inguinal area or right inguinal area.   Genitourinary:     General: Normal vulva.      Exam position: Lithotomy position.      Pubic Area: No rash or pubic lice.       Labia:         Right: No rash, tenderness, lesion or injury.         Left: No rash, tenderness, lesion or injury.       Urethra: No prolapse, urethral pain, urethral swelling or urethral lesion.      Vagina: Normal.      Cervix: Normal.      Uterus: Normal. Not deviated, not enlarged, not fixed, not tender and no uterine prolapse.       Adnexa: Right adnexa normal and left adnexa normal.        Right: No mass, tenderness or fullness.          Left: No mass, tenderness or fullness.     Musculoskeletal:         General: Normal range of motion.      Cervical back: Normal range of motion and neck supple. No rigidity. No muscular tenderness.   Lymphadenopathy:      Cervical: No cervical adenopathy.      Upper Body:      Right upper body: No supraclavicular, axillary or pectoral adenopathy.      Left upper body: No supraclavicular, axillary or pectoral adenopathy.      Lower Body: No right inguinal adenopathy. No left inguinal adenopathy.   Skin:     General: Skin is warm and dry.   Neurological:      General: No focal deficit present.      Mental Status: She is alert and oriented to person, place, and time. Mental status is at baseline.   Psychiatric:         Mood and Affect: Mood normal.          Behavior: Behavior normal.         Thought Content: Thought content normal.         Judgment: Judgment normal.        Result Review :                     Assessment and Plan     Diagnoses and all orders for this visit:    1. Well woman exam with routine gynecological exam (Primary)    2. Encounter for screening for cervical cancer  -     Liquid-based Pap Smear, Screening    3. Screening for diabetes mellitus  -     Comprehensive Metabolic Panel  -     Hemoglobin A1c    4. Screening for lipid disorders  -     Lipid Panel With LDL / HDL Ratio    5. Screening for thyroid disorder  -     T3, Uptake  -     T4, Free  -     TSH    6. Encounter for vitamin deficiency screening  -     Vitamin D,25-Hydroxy    7. Screening for iron deficiency anemia  -     CBC & Differential    8. Non-smoker    Other orders  -     valACYclovir (VALTREX) 500 MG tablet; Take 1 tablet by mouth Daily.  Dispense: 30 tablet; Refill: 12             Follow Up     Return in about 1 year (around 7/8/2025) for Annual physical.  Patient was given instructions and counseling regarding her condition or for health maintenance advice. Please see specific information pulled into the AVS if appropriate.     Follow-up on labs and Pap smear  Call for concerns or questions    Hank Aj MD

## 2024-07-09 LAB
25(OH)D3+25(OH)D2 SERPL-MCNC: 40.5 NG/ML (ref 30–100)
ALBUMIN SERPL-MCNC: 4.1 G/DL (ref 3.9–4.9)
ALP SERPL-CCNC: 49 IU/L (ref 44–121)
ALT SERPL-CCNC: 10 IU/L (ref 0–32)
AST SERPL-CCNC: 12 IU/L (ref 0–40)
BASOPHILS # BLD AUTO: 0.1 X10E3/UL (ref 0–0.2)
BASOPHILS NFR BLD AUTO: 1 %
BILIRUB SERPL-MCNC: 0.2 MG/DL (ref 0–1.2)
BUN SERPL-MCNC: 15 MG/DL (ref 6–20)
BUN/CREAT SERPL: 24 (ref 9–23)
CALCIUM SERPL-MCNC: 8.6 MG/DL (ref 8.7–10.2)
CHLORIDE SERPL-SCNC: 109 MMOL/L (ref 96–106)
CHOLEST SERPL-MCNC: 145 MG/DL (ref 100–199)
CO2 SERPL-SCNC: 23 MMOL/L (ref 20–29)
CREAT SERPL-MCNC: 0.62 MG/DL (ref 0.57–1)
EGFRCR SERPLBLD CKD-EPI 2021: 121 ML/MIN/1.73
EOSINOPHIL # BLD AUTO: 0.1 X10E3/UL (ref 0–0.4)
EOSINOPHIL NFR BLD AUTO: 2 %
ERYTHROCYTE [DISTWIDTH] IN BLOOD BY AUTOMATED COUNT: 12.5 % (ref 11.7–15.4)
GLOBULIN SER CALC-MCNC: 2.1 G/DL (ref 1.5–4.5)
GLUCOSE SERPL-MCNC: 85 MG/DL (ref 70–99)
HBA1C MFR BLD: 5.4 % (ref 4.8–5.6)
HCT VFR BLD AUTO: 38.4 % (ref 34–46.6)
HDLC SERPL-MCNC: 50 MG/DL
HGB BLD-MCNC: 12.3 G/DL (ref 11.1–15.9)
IMM GRANULOCYTES # BLD AUTO: 0 X10E3/UL (ref 0–0.1)
IMM GRANULOCYTES NFR BLD AUTO: 0 %
LDLC SERPL CALC-MCNC: 82 MG/DL (ref 0–99)
LDLC/HDLC SERPL: 1.6 RATIO (ref 0–3.2)
LYMPHOCYTES # BLD AUTO: 1.8 X10E3/UL (ref 0.7–3.1)
LYMPHOCYTES NFR BLD AUTO: 35 %
MCH RBC QN AUTO: 30.5 PG (ref 26.6–33)
MCHC RBC AUTO-ENTMCNC: 32 G/DL (ref 31.5–35.7)
MCV RBC AUTO: 95 FL (ref 79–97)
MONOCYTES # BLD AUTO: 0.3 X10E3/UL (ref 0.1–0.9)
MONOCYTES NFR BLD AUTO: 6 %
NEUTROPHILS # BLD AUTO: 2.8 X10E3/UL (ref 1.4–7)
NEUTROPHILS NFR BLD AUTO: 56 %
PLATELET # BLD AUTO: 233 X10E3/UL (ref 150–450)
POTASSIUM SERPL-SCNC: 4.6 MMOL/L (ref 3.5–5.2)
PROT SERPL-MCNC: 6.2 G/DL (ref 6–8.5)
RBC # BLD AUTO: 4.03 X10E6/UL (ref 3.77–5.28)
SODIUM SERPL-SCNC: 145 MMOL/L (ref 134–144)
T3RU NFR SERPL: 30 % (ref 24–39)
T4 FREE SERPL-MCNC: 0.97 NG/DL (ref 0.82–1.77)
TRIGL SERPL-MCNC: 61 MG/DL (ref 0–149)
TSH SERPL DL<=0.005 MIU/L-ACNC: 1.63 UIU/ML (ref 0.45–4.5)
VLDLC SERPL CALC-MCNC: 13 MG/DL (ref 5–40)
WBC # BLD AUTO: 5.1 X10E3/UL (ref 3.4–10.8)

## 2024-07-11 ENCOUNTER — TELEPHONE (OUTPATIENT)
Dept: OBSTETRICS AND GYNECOLOGY | Age: 34
End: 2024-07-11

## 2024-07-11 LAB
C TRACH RRNA CVX QL NAA+PROBE: NOT DETECTED
GEN CATEG CVX/VAG CYTO-IMP: NORMAL
HPV I/H RISK 4 DNA CVX QL PROBE+SIG AMP: NOT DETECTED
LAB AP CASE REPORT: NORMAL
LAB AP GYN ADDITIONAL INFORMATION: NORMAL
LAB AP GYN OTHER FINDINGS: NORMAL
Lab: NORMAL
N GONORRHOEA RRNA SPEC QL NAA+PROBE: NOT DETECTED
PATH INTERP SPEC-IMP: NORMAL
STAT OF ADQ CVX/VAG CYTO-IMP: NORMAL
TRICHOMONAS VAGINALIS PCR: NOT DETECTED

## 2024-07-11 NOTE — TELEPHONE ENCOUNTER
Caller: Laura Morales    Relationship: Self    Best call back number: [phone]    What is the best time to reach you: ANYTIME     Who are you requesting to speak with (clinical staff, provider,  specific staff member): CLINICAL     Do you know the name of the person who called: TITO    What was the call regarding: LABS    PT RETURNING JANAS CALL PLEASE CALL PT IS ON HER WAY BACK TO WORK BUT WILL ANSWER THE PHONE

## 2024-07-29 ENCOUNTER — OFFICE VISIT (OUTPATIENT)
Dept: FAMILY MEDICINE CLINIC | Facility: CLINIC | Age: 34
End: 2024-07-29
Payer: COMMERCIAL

## 2024-07-29 ENCOUNTER — LAB (OUTPATIENT)
Dept: LAB | Facility: HOSPITAL | Age: 34
End: 2024-07-29
Payer: COMMERCIAL

## 2024-07-29 VITALS
SYSTOLIC BLOOD PRESSURE: 127 MMHG | BODY MASS INDEX: 19.32 KG/M2 | DIASTOLIC BLOOD PRESSURE: 92 MMHG | RESPIRATION RATE: 20 BRPM | HEART RATE: 94 BPM | TEMPERATURE: 98.2 F | WEIGHT: 105 LBS | HEIGHT: 62 IN

## 2024-07-29 DIAGNOSIS — F90.2 ATTENTION DEFICIT HYPERACTIVITY DISORDER (ADHD), COMBINED TYPE: Primary | ICD-10-CM

## 2024-07-29 DIAGNOSIS — R53.83 FATIGUE, UNSPECIFIED TYPE: ICD-10-CM

## 2024-07-29 DIAGNOSIS — H65.92 FLUID LEVEL BEHIND TYMPANIC MEMBRANE OF LEFT EAR: ICD-10-CM

## 2024-07-29 LAB
ALBUMIN SERPL-MCNC: 4.1 G/DL (ref 3.5–5)
ALBUMIN/GLOB SERPL: 1.5 G/DL (ref 1.1–2.5)
ALP SERPL-CCNC: 47 U/L (ref 24–120)
ALT SERPL W P-5'-P-CCNC: 17 U/L (ref 0–35)
ANION GAP SERPL CALCULATED.3IONS-SCNC: 8 MMOL/L (ref 4–13)
AST SERPL-CCNC: 25 U/L (ref 7–45)
B-HCG UR QL: NEGATIVE
BILIRUB SERPL-MCNC: 0.3 MG/DL (ref 0.1–1)
BILIRUB UR QL STRIP: NEGATIVE
BUN SERPL-MCNC: 9 MG/DL (ref 5–21)
BUN/CREAT SERPL: 13.4
CALCIUM SPEC-SCNC: 8.7 MG/DL (ref 8.6–10.5)
CHLORIDE SERPL-SCNC: 111 MMOL/L (ref 98–110)
CLARITY UR: CLEAR
CO2 SERPL-SCNC: 24 MMOL/L (ref 24–31)
COLOR UR: YELLOW
CREAT SERPL-MCNC: 0.67 MG/DL (ref 0.5–1.4)
EGFRCR SERPLBLD CKD-EPI 2021: 118.5 ML/MIN/1.73
GLOBULIN UR ELPH-MCNC: 2.8 GM/DL
GLUCOSE SERPL-MCNC: 97 MG/DL (ref 70–100)
GLUCOSE UR STRIP-MCNC: NEGATIVE MG/DL
HGB UR QL STRIP.AUTO: NEGATIVE
KETONES UR QL STRIP: NEGATIVE
LEUKOCYTE ESTERASE UR QL STRIP.AUTO: NEGATIVE
NITRITE UR QL STRIP: NEGATIVE
PH UR STRIP.AUTO: 6 [PH] (ref 5–8)
POTASSIUM SERPL-SCNC: 3.8 MMOL/L (ref 3.5–5.3)
PROT SERPL-MCNC: 6.9 G/DL (ref 6.3–8.7)
PROT UR QL STRIP: NEGATIVE
SODIUM SERPL-SCNC: 143 MMOL/L (ref 135–145)
SP GR UR STRIP: 1.02 (ref 1–1.03)
UROBILINOGEN UR QL STRIP: NORMAL
VIT B12 BLD-MCNC: 745 PG/ML (ref 211–946)

## 2024-07-29 PROCEDURE — 81025 URINE PREGNANCY TEST: CPT

## 2024-07-29 PROCEDURE — 80053 COMPREHEN METABOLIC PANEL: CPT

## 2024-07-29 PROCEDURE — 82607 VITAMIN B-12: CPT

## 2024-07-29 PROCEDURE — 87798 DETECT AGENT NOS DNA AMP: CPT

## 2024-07-29 PROCEDURE — 36415 COLL VENOUS BLD VENIPUNCTURE: CPT

## 2024-07-29 PROCEDURE — 86757 RICKETTSIA ANTIBODY: CPT

## 2024-07-29 PROCEDURE — 86038 ANTINUCLEAR ANTIBODIES: CPT

## 2024-07-29 PROCEDURE — 1160F RVW MEDS BY RX/DR IN RCRD: CPT

## 2024-07-29 PROCEDURE — 1159F MED LIST DOCD IN RCRD: CPT

## 2024-07-29 PROCEDURE — 81003 URINALYSIS AUTO W/O SCOPE: CPT

## 2024-07-29 PROCEDURE — 86618 LYME DISEASE ANTIBODY: CPT

## 2024-07-29 PROCEDURE — 99214 OFFICE O/P EST MOD 30 MIN: CPT

## 2024-07-29 RX ORDER — DEXTROAMPHETAMINE SACCHARATE, AMPHETAMINE ASPARTATE MONOHYDRATE, DEXTROAMPHETAMINE SULFATE AND AMPHETAMINE SULFATE 7.5; 7.5; 7.5; 7.5 MG/1; MG/1; MG/1; MG/1
30 CAPSULE, EXTENDED RELEASE ORAL EVERY MORNING
Qty: 30 CAPSULE | Refills: 0 | Status: SHIPPED | OUTPATIENT
Start: 2024-07-29 | End: 2024-08-28

## 2024-07-29 RX ORDER — DEXTROAMPHETAMINE SACCHARATE, AMPHETAMINE ASPARTATE MONOHYDRATE, DEXTROAMPHETAMINE SULFATE AND AMPHETAMINE SULFATE 7.5; 7.5; 7.5; 7.5 MG/1; MG/1; MG/1; MG/1
30 CAPSULE, EXTENDED RELEASE ORAL EVERY MORNING
Qty: 30 CAPSULE | Refills: 0 | Status: SHIPPED | OUTPATIENT
Start: 2024-08-26 | End: 2024-09-25

## 2024-07-29 NOTE — PROGRESS NOTES
"Chief Complaint  ADD, Med Refill, and Fatigue    Subjective    History of Present Illness      Patient presents to [facility] for   History of Present Illness  States she is doing fine on her med's. She is wondering about her vit b level and recent lab results. She is having fatigue.   Fatigue  Associated symptoms include fatigue.        Review of Systems   Constitutional:  Positive for fatigue.       I have reviewed and agree with the HPI and ROS information as above.  Lea E [place], APRN     Objective   Vital Signs:   /92   Pulse 94   Temp 98.2 °F (36.8 °C)   Resp 20   Ht 157.5 cm (62\")   Wt 47.6 kg (105 lb)   BMI 19.20 kg/m²     BMI is within normal parameters. No other follow-up for BMI required.      Physical Exam  Vitals and nursing note reviewed.   Constitutional:       General: She is not in acute distress.     Appearance: Normal appearance. She is not ill-appearing.   HENT:      Head: Normocephalic and atraumatic.      Right Ear: External ear normal. No middle ear effusion. Tympanic membrane is not erythematous or bulging.      Left Ear: External ear normal. A middle ear effusion is present. Tympanic membrane is not erythematous or bulging.      Nose: Nose normal.   Eyes:      Conjunctiva/sclera: Conjunctivae normal.   Cardiovascular:      Rate and Rhythm: Normal rate and regular rhythm.      Pulses: Normal pulses.      Heart sounds: Normal heart sounds.   Pulmonary:      Effort: Pulmonary effort is normal.      Breath sounds: Normal breath sounds.   Skin:     General: Skin is warm and dry.   Neurological:      Mental Status: She is alert and oriented to person, place, and time. Mental status is at baseline.      GCS: GCS eye subscore is 4. GCS verbal subscore is 5. GCS motor subscore is 6.   Psychiatric:         Mood and Affect: Mood normal.         Behavior: Behavior normal.         Thought Content: Thought content normal.         Judgment: Judgment normal. "          HARSHAL-7: Over the last two weeks, how often have you been bothered by the following problems?  If you checked any problems, how difficult have these problems made it for you to do your work, take care of things at home, or get along with other people: Not difficult at all    PHQ-2 Depression Screening  Little interest or pleasure in doing things? 0-->not at all   Feeling down, depressed, or hopeless? 0-->not at all   PHQ-2 Total Score 0     PHQ-9 Depression Screening  Little interest or pleasure in doing things? 0-->not at all   Feeling down, depressed, or hopeless? 0-->not at all   Trouble falling or staying asleep, or sleeping too much?     Feeling tired or having little energy?     Poor appetite or overeating?     Feeling bad about yourself - or that you are a failure or have let yourself or your family down?     Trouble concentrating on things, such as reading the newspaper or watching television?     Moving or speaking so slowly that other people could have noticed? Or the opposite - being so fidgety or restless that you have been moving around a lot more than usual?     Thoughts that you would be better off dead, or of hurting yourself in some way?     PHQ-9 Total Score 0   If you checked off any problems, how difficult have these problems made it for you to do your work, take care of things at home, or get along with other people?        Result Review  Data Reviewed:            Office Visit with Hank Aj MD (07/08/2024)   CBC & Differential (07/08/2024 07:53)  Comprehensive Metabolic Panel (07/08/2024 07:53)  Hemoglobin A1c (07/08/2024 07:53)  Lipid Panel With LDL / HDL Ratio (07/08/2024 07:53)  T3, Uptake (07/08/2024 07:53)  T4, Free (07/08/2024 07:53)  TSH (07/08/2024 07:53)  Vitamin D,25-Hydroxy (07/08/2024 07:53)           Assessment and Plan      Diagnoses and all orders for this visit:    1. Attention deficit hyperactivity disorder (ADHD), combined type (Primary)    2. Fatigue,  unspecified type  -     Comprehensive Metabolic Panel  -     Urinalysis With Culture If Indicated -  -     Vitamin B12  -     VELMA  -     Rickettsia Species DNA, Real-Time PCR  -     Lyme Disease Total Antibody With Reflex to Immunoassay  -     Spotted Fever Group AB, IgG/IgM  -     Pregnancy, Urine - Urine, Clean Catch    3. Fluid level behind tympanic membrane of left ear      Patient is seen today following up on ADHD and wanting to discuss continued fatigue.  She has been taking Adderall XR 30 mg daily and feels she is doing well on this dose, would like to continue same as her symptoms are well-controlled.  UDS is up-to-date and appropriate, CSA updated at today's visit, Reji pending, patient denies HI/SI, will plan medication to Dr. Valentine.    She states she has been suffering from generalized fatigue over the last 4 to 5 months.  She does okay while she is at work, tends to work 10-hour shifts and she is currently in training to be a .  She is also in full-time school.  She works about 4 days a week.  States she is aware that some of this could be contributing to her fatigue, however would like some extra labs checked.  Dr. Aj did a lab panel at her well woman exam, however she would like a vitamin B12 checked.  She is inquiring about compound vitamin B12 injections to help with energy levels.  I discussed with her that this is not something specifically that I do but I can discuss with the other providers in the office to see what their comfort level is.  Nonetheless we will recheck her CMP as she had some mild hyponatremia earlier this month.  I will also add an VELMA as well as tick labs, UA, and a pregnancy test.  We will call with results.  We discussed that her lifestyle is likely contributing significantly to her fatigue levels as she states she gets home and does not want to do anything, has a hard time getting off the couch.  Feels she is not depressed whatsoever.  States she just  overall very tired.    Patient also notes that she has been experiencing popping and feels like there are bubbles in her left ear for the last 2 or 3 years.  She takes Zyrtec daily as well as Flonase twice daily.  Has seen ENT for this, Dr. Donahue and Alpesh per her report.  She states they do have a hearing test scheduled for her and want to discuss TMJ but she does not feel that this is the problem.  I have recommended that we defer to them for further.  On exam I do see some very minimal left middle ear effusion, no obvious signs of infection.    Plan:  1.  Labs pending  2.  Continue Zyrtec and Flonase daily, follow-up with ENT  3.  Continue Adderall XR 30 mg daily  4.  Follow-up in 3 months        Follow Up   Return in about 3 months (around 10/29/2024).  Patient was given instructions and counseling regarding her condition or for health maintenance advice. Please see specific information pulled into the AVS if appropriate.

## 2024-07-30 ENCOUNTER — PATIENT MESSAGE (OUTPATIENT)
Dept: FAMILY MEDICINE CLINIC | Facility: CLINIC | Age: 34
End: 2024-07-30
Payer: COMMERCIAL

## 2024-07-30 LAB
ANA SER QL: NEGATIVE
B BURGDOR IGG+IGM SER QL IA: NEGATIVE

## 2024-07-31 ENCOUNTER — NURSE TRIAGE (OUTPATIENT)
Dept: CALL CENTER | Facility: HOSPITAL | Age: 34
End: 2024-07-31
Payer: COMMERCIAL

## 2024-07-31 NOTE — TELEPHONE ENCOUNTER
Reason for Disposition   [1] ADHD AND [2] taking stimulant medication    Additional Information   Negative: Passed out (i.e., lost consciousness, collapsed and was not responding)   Negative: Shock suspected (e.g., cold/pale/clammy skin, too weak to stand, low BP, rapid pulse)   Negative: Difficult to awaken or acting confused (e.g., disoriented, slurred speech)   Negative: Visible sweat on face or sweat dripping down face   Negative: Unable to walk, or can only walk with assistance (e.g., requires support)   Negative: [1] Received SHOCK from implantable cardiac defibrillator AND [2] persisting symptoms (i.e., palpitations, lightheadedness)   Negative: [1] Dizziness, lightheadedness, or weakness AND [2] heart beating very rapidly (e.g., > 140 / minute)   Negative: [1] Dizziness, lightheadedness, or weakness AND [2] heart beating very slowly (e.g., < 50 / minute)   Negative: Sounds like a life-threatening emergency to the triager   Negative: Chest pain   Negative: Implantable Cardiac Defibrillator (ICD) or a pacemaker symptoms or questions   Negative: Difficulty breathing   Negative: Dizziness, lightheadedness, or weakness   Negative: [1] Heart beating very rapidly (e.g., > 140 / minute) AND [2] present now  (Exception: During exercise.)   Negative: Heart beating very slowly (e.g., < 50 / minute)  (Exception: Athlete and heart rate normal for caller.)   Negative: New or worsened shortness of breath with activity (dyspnea on exertion)   Negative: Patient sounds very sick or weak to the triager   Negative: [1] Heart beating very rapidly (e.g., > 140 / minute) AND [2] not present now  (Exception: During exercise.)   Negative: [1] Skipped or extra beat(s) AND [2] increases with exercise or exertion   Negative: [1] Skipped or extra beat(s) AND [2] occurs 4 or more times per minute   Negative: New or worsened ankle swelling   Negative: History of heart disease (i.e., heart attack, bypass surgery, angina, angioplasty, CHF)   "(Exception: Brief heartbeat symptoms that went away and now feels well.)   Negative: Age > 60 years  (Exception: Brief heartbeat symptoms that went away and now feels well.)   Negative: Taking water pill (i.e., diuretic) or heart medication (e.g., digoxin)   Negative: Wearing a \"Holter monitor\" or \"cardiac event monitor\"   Negative: [1] Received SHOCK from implantable cardiac defibrillator AND [2] now feels well   Negative: Heart rhythm alert (e.g., \"you have irregular heartbeat\") from personal wearable device (e.g., Apple Watch)   Negative: History of hyperthyroidism or taking thyroid medication   Negative: Substance use (drug use) or misuse, known or suspected    Answer Assessment - Initial Assessment Questions  1. DESCRIPTION: \"Please describe your heart rate or heartbeat that you are having\" (e.g., fast/slow, regular/irregular, skipped or extra beats, \"palpitations\")      Had polygraph yesterday and told by   2. ONSET: \"When did it start?\" (Minutes, hours or days)       States she had the test yesterday but has ongoing issues with anxiety and palpitations, had holter monitor in 2020 that did not show any arrhythmia  3. DURATION: \"How long does it last\" (e.g., seconds, minutes, hours)      She does not feel the murmur  4. PATTERN \"Does it come and go, or has it been constant since it started?\"  \"Does it get worse with exertion?\"   \"Are you feeling it now?\"      na  5. TAP: \"Using your hand, can you tap out what you are feeling on a chair or table in front of you, so that I can hear?\" (Note: not all patients can do this)        Regular at present  6. HEART RATE: \"Can you tell me your heart rate?\" \"How many beats in 15 seconds?\"  (Note: not all patients can do this)        She is not sure at present  7. RECURRENT SYMPTOM: \"Have you ever had this before?\" If Yes, ask: \"When was the last time?\" and \"What happened that time?\"       \"I have never been told that I have a murmur\"   8. CAUSE: \"What do you think is causing " "the palpitations?\"      unknown  9. CARDIAC HISTORY: \"Do you have any history of heart disease?\" (e.g., heart attack, angina, bypass surgery, angioplasty, arrhythmia)       none  10. OTHER SYMPTOMS: \"Do you have any other symptoms?\" (e.g., dizziness, chest pain, sweating, difficulty breathing)        Anxiety, ADHD  11. PREGNANCY: \"Is there any chance you are pregnant?\" \"When was your last menstrual period?\"        na    Protocols used: Heart Rate and Heartbeat Questions-ADULT-AH    "

## 2024-08-02 ENCOUNTER — TELEPHONE (OUTPATIENT)
Dept: FAMILY MEDICINE CLINIC | Facility: CLINIC | Age: 34
End: 2024-08-02
Payer: COMMERCIAL

## 2024-08-02 LAB — RICKETTSIA RICKETTSII DNA, RT: NOT DETECTED

## 2024-08-02 NOTE — TELEPHONE ENCOUNTER
----- Message from Lea English sent at 8/1/2024  3:50 PM CDT -----  CMP shows just very mildly elevated chloride.  Otherwise unremarkable.  Pregnancy test negative.  UA, vitamin B12, VELMA, and Lyme disease test are all negative.  I am still waiting on Rickettsia and spotted fever test.  We will call with those results when they are available.

## 2024-08-02 NOTE — TELEPHONE ENCOUNTER
Contacted pt, verified name and , informed of   CMP shows just very mildly elevated chloride.  Otherwise unremarkable.  Pregnancy test negative.  UA, vitamin B12, VELMA, and Lyme disease test are all negative.  I am still waiting on Rickettsia and spotted fever test.  We will call with those results when they are available.  Pt reports that she went ahead and scheduled an appointment on Monday with our office with another concern. Pt vu of above and thanked staff.

## 2024-08-05 ENCOUNTER — OFFICE VISIT (OUTPATIENT)
Dept: FAMILY MEDICINE CLINIC | Facility: CLINIC | Age: 34
End: 2024-08-05
Payer: COMMERCIAL

## 2024-08-05 VITALS
BODY MASS INDEX: 19.32 KG/M2 | SYSTOLIC BLOOD PRESSURE: 105 MMHG | OXYGEN SATURATION: 100 % | HEIGHT: 62 IN | HEART RATE: 79 BPM | DIASTOLIC BLOOD PRESSURE: 73 MMHG | WEIGHT: 105 LBS

## 2024-08-05 DIAGNOSIS — R00.2 PALPITATIONS: Primary | ICD-10-CM

## 2024-08-05 DIAGNOSIS — R42 DIZZINESS: ICD-10-CM

## 2024-08-05 PROCEDURE — 99214 OFFICE O/P EST MOD 30 MIN: CPT

## 2024-08-05 PROCEDURE — 1159F MED LIST DOCD IN RCRD: CPT

## 2024-08-05 PROCEDURE — 93000 ELECTROCARDIOGRAM COMPLETE: CPT

## 2024-08-05 PROCEDURE — 1160F RVW MEDS BY RX/DR IN RCRD: CPT

## 2024-08-05 NOTE — PROGRESS NOTES
"Chief Complaint  Heart Murmur    Subjective    History of Present Illness      Patient presents to [facility] for   History of Present Illness  Pt presents today for heart murmur. Pt states last tuesday she did a polygraph and that's when they told her       Review of Systems    I have reviewed and agree with the HPI and ROS information as above.  Lea TADEO , APRN     Objective   Vital Signs:   /73   Pulse 79   Ht 157.5 cm (62.01\")   Wt 47.6 kg (105 lb)   SpO2 100%   BMI 19.20 kg/m²     BMI is within normal parameters. No other follow-up for BMI required.      Physical Exam  Vitals and nursing note reviewed.   Constitutional:       General: She is not in acute distress.     Appearance: Normal appearance. She is not ill-appearing.   HENT:      Head: Normocephalic and atraumatic.      Right Ear: External ear normal.      Left Ear: External ear normal.      Nose: Nose normal.   Eyes:      Conjunctiva/sclera: Conjunctivae normal.   Cardiovascular:      Rate and Rhythm: Normal rate and regular rhythm.      Pulses: Normal pulses.      Heart sounds: Normal heart sounds.   Pulmonary:      Effort: Pulmonary effort is normal.      Breath sounds: Normal breath sounds.   Skin:     General: Skin is warm and dry.      Capillary Refill: Capillary refill takes less than 2 seconds.   Neurological:      Mental Status: She is alert and oriented to person, place, and time. Mental status is at baseline.      GCS: GCS eye subscore is 4. GCS verbal subscore is 5. GCS motor subscore is 6.   Psychiatric:         Mood and Affect: Mood normal.         Behavior: Behavior normal.         Thought Content: Thought content normal.         Judgment: Judgment normal.          HARSHAL-7:      PHQ-2 Depression Screening  Little interest or pleasure in doing things?     Feeling down, depressed, or hopeless?     PHQ-2 Total Score       PHQ-9 Depression Screening  Little interest or pleasure in doing things?   "   Feeling down, depressed, or hopeless?     Trouble falling or staying asleep, or sleeping too much?     Feeling tired or having little energy?     Poor appetite or overeating?     Feeling bad about yourself - or that you are a failure or have let yourself or your family down?     Trouble concentrating on things, such as reading the newspaper or watching television?     Moving or speaking so slowly that other people could have noticed? Or the opposite - being so fidgety or restless that you have been moving around a lot more than usual?     Thoughts that you would be better off dead, or of hurting yourself in some way?     PHQ-9 Total Score     If you checked off any problems, how difficult have these problems made it for you to do your work, take care of things at home, or get along with other people?        Result Review  Data Reviewed:            ECG 12 Lead (05/19/2020 15:41)   Comprehensive Metabolic Panel (07/29/2024 10:22)       ECG 12 Lead    Date/Time: 8/5/2024 9:29 AM  Performed by: Lea Shannon APRN    Authorized by: Lea Shannon APRN  Comparison: compared with previous ECG from 5/19/2020  Rhythm: sinus rhythm  Rate: normal  BPM: 77  Conduction: conduction normal  ST Segments: ST segments normal  T Waves: T waves normal  Other: no other findings    Clinical impression: normal ECG            Assessment and Plan      Diagnoses and all orders for this visit:    1. Palpitations (Primary)  -     ECG 12 Lead  -     Holter Monitor - 72 Hour Up To 15 Days  -     Adult Transthoracic Echo Complete W/ Cont if Necessary Per Protocol; Future    2. Dizziness  -     ECG 12 Lead  -     Holter Monitor - 72 Hour Up To 15 Days  -     Adult Transthoracic Echo Complete W/ Cont if Necessary Per Protocol; Future      Patient is seen today wanting to be evaluated for heart murmur as she recently had a polygraph test done and the technician told her that he could see that she has a heart murmur.  Patient states she  has been experiencing palpitations and intermittent dizzy spells over the last several years accompanied by some slight left arm pain every now and then.  States she has had a Holter monitor placed previously that was unremarkable, I am not able to see this report on chart review.  On exam today I do not appreciate any obvious heart murmur heard, however given that she is symptomatic we will proceed with EKG and Holter monitor placement in office.  I will also order an echo for further.  She is agreeable and appreciative.  Will call with results.  Present to ER with any active chest pain.  She denies shortness of breath or syncope.  Of note, she is taking stimulants which can contribute to some of the symptoms.  May consider holding stimulant depending on cardiac workup results.    Plan:  1.  EKG performed in office as above; NSR 77 bpm  2.  Holter monitor placed in office  3.  Echo pending  4.  Follow-up as needed, to ER with active chest pain        Follow Up   Return if symptoms worsen or fail to improve.  Patient was given instructions and counseling regarding her condition or for health maintenance advice. Please see specific information pulled into the AVS if appropriate.

## 2024-08-06 ENCOUNTER — TELEPHONE (OUTPATIENT)
Dept: FAMILY MEDICINE CLINIC | Facility: CLINIC | Age: 34
End: 2024-08-06
Payer: COMMERCIAL

## 2024-08-06 NOTE — TELEPHONE ENCOUNTER
Sent pt [employer] message relaying below    HUB TO RELAY  Your rickettsia testing was negative. Please let me know if you have any questions.

## 2024-08-14 ENCOUNTER — PATIENT MESSAGE (OUTPATIENT)
Dept: FAMILY MEDICINE CLINIC | Facility: CLINIC | Age: 34
End: 2024-08-14
Payer: COMMERCIAL

## 2024-08-16 ENCOUNTER — TELEPHONE (OUTPATIENT)
Dept: FAMILY MEDICINE CLINIC | Facility: CLINIC | Age: 34
End: 2024-08-16
Payer: COMMERCIAL

## 2024-08-16 LAB
RESULT COMMENT:: NORMAL
RICK SF IGG TITR SER: NORMAL {TITER}
RICK SF IGM TITR SER: NORMAL {TITER}

## 2024-08-16 NOTE — TELEPHONE ENCOUNTER
Spoke to pt, verified name and . Informed of Holter monitor results in more depth. Advised pt that the echo would help determine a heart murmur. Advised pt to keep appointment since this is one of pt's main concerns. Pt requesting echo be moved to a sooner date. Advised we have contacted and there are no sooner dates available at this time. Told pt she is welcomed to call herself as well to see if there may be cancellations in the future to get her appointment moved up to a sooner date. Pt vu of above and thanked staff.

## 2024-08-16 NOTE — TELEPHONE ENCOUNTER
"Pt called into office asking for further details regarding holter results and if she should continue with Echo.   Results sent via "[employer] are as follows; \"Overall benign monitor study.  Occasional PVCs and PACs, very little burden.\"    She is unsure what this means and is upset she was not called with results to allow her to ask further questions.     She would like a call back with answers to these questions.   "

## 2024-08-19 ENCOUNTER — TELEPHONE (OUTPATIENT)
Dept: FAMILY MEDICINE CLINIC | Facility: CLINIC | Age: 34
End: 2024-08-19
Payer: COMMERCIAL

## 2024-08-19 NOTE — TELEPHONE ENCOUNTER
Sent pt [employer] message relaying below    HUB TO RELAY  Your [place] Spotted Fever Testing was normal. Please let me know if you have any questions.

## 2024-09-25 RX ORDER — IBUPROFEN 600 MG/1
600 TABLET, FILM COATED ORAL EVERY 6 HOURS PRN
Qty: 90 TABLET | Refills: 2 | OUTPATIENT
Start: 2024-09-25

## 2024-09-27 ENCOUNTER — TELEPHONE (OUTPATIENT)
Dept: FAMILY MEDICINE CLINIC | Facility: CLINIC | Age: 34
End: 2024-09-27
Payer: COMMERCIAL

## 2024-10-08 ENCOUNTER — TELEPHONE (OUTPATIENT)
Dept: FAMILY MEDICINE CLINIC | Facility: CLINIC | Age: 34
End: 2024-10-08
Payer: COMMERCIAL

## 2024-10-08 NOTE — TELEPHONE ENCOUNTER
Notified patient that ins denied echo. She stated that she had an appt with martín in a couple of weeks and will just talk to her then.

## 2024-10-12 DIAGNOSIS — F90.2 ATTENTION DEFICIT HYPERACTIVITY DISORDER (ADHD), COMBINED TYPE: ICD-10-CM

## 2024-10-14 RX ORDER — DEXTROAMPHETAMINE SULFATE, DEXTROAMPHETAMINE SACCHARATE, AMPHETAMINE SULFATE AND AMPHETAMINE ASPARTATE 7.5; 7.5; 7.5; 7.5 MG/1; MG/1; MG/1; MG/1
CAPSULE, EXTENDED RELEASE ORAL
Qty: 30 CAPSULE | Refills: 0 | Status: SHIPPED | OUTPATIENT
Start: 2024-10-14

## 2024-10-14 NOTE — TELEPHONE ENCOUNTER
Rx Refill Note  Requested Prescriptions     Pending Prescriptions Disp Refills    Adderall XR 30 MG 24 hr capsule [Pharmacy Med Name: Adderall XR 30 mg capsule,extended release] 30 capsule 0     Sig: Take 1 capsule by mouth Every Morning for 30 days *08/25/24*      Last office visit with prescribing clinician: Office Visit with Lea Shannon APRN (07/29/2024)    Last telemedicine visit with prescribing clinician: Visit date not found   Next office visit with prescribing clinician: Visit date not found    3rd    Marcial Schmitz MA  10/14/24, 08:14 CDT

## 2024-11-15 ENCOUNTER — NURSE TRIAGE (OUTPATIENT)
Dept: CALL CENTER | Facility: HOSPITAL | Age: 34
End: 2024-11-15
Payer: COMMERCIAL

## 2024-11-16 ENCOUNTER — HOSPITAL ENCOUNTER (EMERGENCY)
Facility: HOSPITAL | Age: 34
Discharge: HOME | End: 2024-11-16
Payer: COMMERCIAL

## 2024-11-16 VITALS
SYSTOLIC BLOOD PRESSURE: 118 MMHG | DIASTOLIC BLOOD PRESSURE: 76 MMHG | RESPIRATION RATE: 20 BRPM | TEMPERATURE: 98 F | HEIGHT: 62 IN | OXYGEN SATURATION: 98 % | WEIGHT: 103 LBS | HEART RATE: 76 BPM | BODY MASS INDEX: 18.95 KG/M2

## 2024-11-16 DIAGNOSIS — B34.8 RHINOVIRUS: Primary | ICD-10-CM

## 2024-11-16 LAB
B PARAPERT DNA SPEC QL NAA+PROBE: NOT DETECTED
B PERT DNA SPEC QL NAA+PROBE: NOT DETECTED
C PNEUM DNA NPH QL NAA+NON-PROBE: NOT DETECTED
FLUAV SUBTYP SPEC NAA+PROBE: NOT DETECTED
FLUBV RNA ISLT QL NAA+PROBE: NOT DETECTED
HADV DNA SPEC NAA+PROBE: NOT DETECTED
HCOV 229E RNA SPEC QL NAA+PROBE: NOT DETECTED
HCOV HKU1 RNA SPEC QL NAA+PROBE: NOT DETECTED
HCOV NL63 RNA SPEC QL NAA+PROBE: NOT DETECTED
HCOV OC43 RNA SPEC QL NAA+PROBE: NOT DETECTED
HMPV RNA NPH QL NAA+NON-PROBE: NOT DETECTED
HOLD SPECIMEN: NORMAL
HPIV1 RNA ISLT QL NAA+PROBE: NOT DETECTED
HPIV2 RNA SPEC QL NAA+PROBE: NOT DETECTED
HPIV3 RNA NPH QL NAA+PROBE: NOT DETECTED
HPIV4 P GENE NPH QL NAA+PROBE: NOT DETECTED
M PNEUMO IGG SER IA-ACNC: NOT DETECTED
RHINOVIRUS RNA SPEC NAA+PROBE: DETECTED
RSV RNA NPH QL NAA+NON-PROBE: NOT DETECTED
S PYO AG THROAT QL: NEGATIVE
SARS-COV-2 RNA NPH QL NAA+NON-PROBE: NOT DETECTED

## 2024-11-16 PROCEDURE — 87081 CULTURE SCREEN ONLY: CPT | Performed by: NURSE PRACTITIONER

## 2024-11-16 PROCEDURE — 0202U NFCT DS 22 TRGT SARS-COV-2: CPT | Performed by: NURSE PRACTITIONER

## 2024-11-16 PROCEDURE — 99283 EMERGENCY DEPT VISIT LOW MDM: CPT

## 2024-11-16 PROCEDURE — 87880 STREP A ASSAY W/OPTIC: CPT | Performed by: NURSE PRACTITIONER

## 2024-11-16 RX ORDER — BENZONATATE 200 MG/1
200 CAPSULE ORAL 3 TIMES DAILY PRN
Qty: 20 CAPSULE | Refills: 0 | Status: SHIPPED | OUTPATIENT
Start: 2024-11-16

## 2024-11-16 RX ORDER — CETIRIZINE HYDROCHLORIDE, PSEUDOEPHEDRINE HYDROCHLORIDE 5; 120 MG/1; MG/1
1 TABLET, FILM COATED, EXTENDED RELEASE ORAL 2 TIMES DAILY
Qty: 15 TABLET | Refills: 0 | Status: SHIPPED | OUTPATIENT
Start: 2024-11-16

## 2024-11-16 RX ORDER — FLUTICASONE PROPIONATE 50 MCG
2 SPRAY, SUSPENSION (ML) NASAL DAILY
Qty: 9.9 G | Refills: 0 | Status: SHIPPED | OUTPATIENT
Start: 2024-11-16

## 2024-11-16 NOTE — TELEPHONE ENCOUNTER
Patient called regarding possible COVID symptoms. Advised per protocol; can go to [facility] to be tested or do home test. Call [facility] back with any further questions or concerns.     Reason for Disposition   [1] COVID-19 infection suspected by caller or triager AND [2] mild symptoms (cough, fever, or others) AND [3] has not gotten tested yet    Additional Information   Negative: SEVERE difficulty breathing (e.g., struggling for each breath, speaks in single words)   Negative: Difficult to awaken or acting confused (e.g., disoriented, slurred speech)   Negative: Bluish (or gray) lips or face now   Negative: Shock suspected (e.g., cold/pale/clammy skin, too weak to stand, low BP, rapid pulse)   Negative: Sounds like a life-threatening emergency to the triager   Negative: [1] Diagnosed or suspected COVID-19 AND [2] symptoms lasting 3 or more weeks   Negative: [1] COVID-19 exposure AND [2] no symptoms   Negative: COVID-19 vaccine reaction suspected (e.g., fever, headache, muscle aches) occurring 1 to 3 days after getting vaccine   Negative: COVID-19 vaccine, questions about   Negative: [1] Lives with someone known to have influenza (flu test positive) AND [2] flu-like symptoms (e.g., cough, runny nose, sore throat, SOB; with or without fever)   Negative: [1] Possible COVID-19 symptoms AND [2] triager concerned about severity of symptoms or other causes   Negative: COVID-19 and breastfeeding, questions about   Negative: SEVERE or constant chest pain or pressure  (Exception: Mild central chest pain, present only when coughing.)   Negative: MODERATE difficulty breathing (e.g., speaks in phrases, SOB even at rest, pulse 100-120)   Negative: [1] Headache AND [2] stiff neck (can't touch chin to chest)   Negative: Oxygen level (e.g., pulse oximetry) 90 percent or lower   Negative: Chest pain or pressure  (Exception: MILD central chest pain, present only when coughing.)   Negative: [1] Drinking very little AND [2] dehydration suspected  (e.g., no urine > 12 hours, very dry mouth, very lightheaded)   Negative: Patient sounds very sick or weak to the triager   Negative: MILD difficulty breathing (e.g., minimal/no SOB at rest, SOB with walking, pulse <100)   Negative: Fever > 103 F (39.4 C)   Negative: [1] Fever > 101 F (38.3 C) AND [2] age > 60 years   Negative: [1] Fever > 100.0 F (37.8 C) AND [2] bedridden (e.g., CVA, chronic illness, recovering from surgery)   Negative: Oxygen level (e.g., pulse oximetry) 91 to 94 percent   Negative: [1] HIGH RISK patient (e.g., weak immune system, age > 64 years, obesity with BMI 30 or higher, pregnant, chronic lung disease or other chronic medical condition) AND [2] COVID symptoms (e.g., cough, fever)  (Exceptions: Already seen by PCP and no new or worsening symptoms.)   Negative: [1] HIGH RISK patient AND [2] influenza is widespread in the community AND [3] ONE OR MORE respiratory symptoms: cough, sore throat, runny or stuffy nose   Negative: [1] HIGH RISK patient AND [2] influenza exposure within the last 7 days AND [3] ONE OR MORE respiratory symptoms: cough, sore throat, runny or stuffy nose   Negative: Fever present > 3 days (72 hours)   Negative: [1] Fever returns after gone for over 24 hours AND [2] symptoms worse or not improved   Negative: [1] Continuous (nonstop) coughing interferes with work or school AND [2] no improvement using cough treatment per Care Advice   Negative: [1] COVID-19 infection suspected by caller or triager AND [2] mild symptoms (cough, fever, or others) AND [3] negative COVID-19 rapid test   Negative: Cough present > 3 weeks   Negative: [1] COVID-19 diagnosed by positive lab test (e.g., PCR, rapid self-test kit) AND [2] NO symptoms (e.g., cough, fever, others)   Negative: [1] COVID-19 diagnosed by positive lab test (e.g., PCR, rapid self-test kit) AND [2] mild symptoms (e.g., cough, fever, others) AND [3] no complications or SOB   Negative: [1] COVID-19 diagnosed by doctor (or  "NP/PA) AND [2] mild symptoms (e.g., cough, fever, others) AND [3] no complications or SOB   Negative: [1] COVID-19 diagnosed AND [2] has mild nausea, vomiting or diarrhea    Answer Assessment - Initial Assessment Questions  1. COVID-19 DIAGNOSIS: \"How do you know that you have COVID?\" (e.g., positive lab test or self-test, diagnosed by doctor or NP/PA, symptoms after exposure).      Not tested  2. COVID-19 EXPOSURE: \"Was there any known exposure to COVID before the symptoms began?\" [facility] [place] Definition of close contact: within 6 feet (2 meters) for a total of 15 minutes or more over a 24-hour period.       unknown  3. ONSET: \"When did the COVID-19 symptoms start?\"       Wednesday  4. WORST SYMPTOM: \"What is your worst symptom?\" (e.g., cough, fever, shortness of breath, muscle aches)      Sinus pressure, headache   5. COUGH: \"Do you have a cough?\" If Yes, ask: \"How bad is the cough?\"        Slight cough  6. FEVER: \"Do you have a fever?\" If Yes, ask: \"What is your temperature, how was it measured, and when did it start?\"      uncertain  7. RESPIRATORY STATUS: \"Describe your breathing?\" (e.g., normal; shortness of breath, wheezing, unable to speak)       Denies SOB, but states chest hurts at rest   8. BETTER-SAME-WORSE: \"Are you getting better, staying the same or getting worse compared to yesterday?\"  If getting worse, ask, \"In what way?\"      worse  9. OTHER SYMPTOMS: \"Do you have any other symptoms?\"  (e.g., chills, fatigue, headache, loss of smell or taste, muscle pain, sore throat)      Headache   10. HIGH RISK DISEASE: \"Do you have any chronic medical problems?\" (e.g., asthma, heart or lung disease, weak immune system, obesity, etc.)       Denies   11. VACCINE: \"Have you had the COVID-19 vaccine?\" If Yes, ask: \"Which one, how many shots, when did you get it?\"       Has not had COVID vaccine   12. PREGNANCY: \"Is there any chance you are pregnant?\" \"When was your last menstrual period?\"        N/a  13. O2 SATURATION " "MONITOR:  \"Do you use an oxygen saturation monitor (pulse oximeter) at home?\" If Yes, ask \"What is your reading (oxygen level) today?\" \"What is your usual oxygen saturation reading?\" (e.g., 95%)        N/a    Protocols used: Coronavirus (COVID-19) Diagnosed or Suspected-ADULT-AH    "

## 2024-11-16 NOTE — ED PROVIDER NOTES
Subjective   History of Present Illness  Patient is a 33-year-old female who presents to the ER with URI symptoms.  She states she began developing cough with congestion on Wednesday as well as a sore throat.  She has been taking over-the-counter Mucinex without relief of symptoms.  She also has lost sense of taste.  She has had no known fevers.  She reports body aches and headache symptoms.  She states she just feels poorly all over.  She denies any nausea, vomiting, or diarrhea.  She states that she has been exposed to others who have tested positive for COVID and RSV.  Past medical history significant for ADD, hypertension, UTIs        Review of Systems   Constitutional:  Positive for activity change. Negative for fever.   HENT:  Positive for congestion, ear pain and sore throat.    Respiratory:  Positive for cough.    Cardiovascular: Negative.  Negative for chest pain.   Gastrointestinal: Negative.  Negative for abdominal pain, constipation, diarrhea, nausea and vomiting.   Genitourinary: Negative.  Negative for dysuria.   Musculoskeletal:  Positive for myalgias.   Neurological:  Positive for headaches.   All other systems reviewed and are negative.      Past Medical History:   Diagnosis Date    Abnormal Pap smear of cervix     ADD (attention deficit disorder)     Fracture of rib of left side     Hypertension     Seasonal allergies     UTI (urinary tract infection)        Allergies   Allergen Reactions    Acetaminophen-Codeine Hives    Codeine Rash       Past Surgical History:   Procedure Laterality Date    BREAST SURGERY      AUGMENTATION    BREAST SURGERY      REDO OF PREVIOUS SURGERY    CYST REMOVAL      LEEP N/A 7/27/2023    Procedure: LOOP ELECTROSURGERY EXCISION PROCEDURE;  Surgeon: Lissett Belle MD;  Location[facility];  Service: Obstetrics/Gynecology;  Laterality: N/A;    SEPTOPLASTY, RESECTION INFERIOR TURBINATES Bilateral 03/18/2021    Procedure: septoplasty with turbinate reduction;   Surgeon: Mart Betts Jr., MD;  Location: [facility];  Service: ENT;  Laterality: Bilateral;       Family History   Problem Relation Age of Onset    Gout Father     Breast cancer Paternal Grandmother 50    Cancer Maternal Grandmother         breast    Diabetes Maternal Grandmother     Breast cancer Paternal Aunt        Social History     Socioeconomic History    Marital status: Single   Tobacco Use    Smoking status: Former     Current packs/day: 0.00     Average packs/day: 0.3 packs/day for 10.0 years (2.5 ttl pk-yrs)     Types: Cigarettes     Start date: 2002     Quit date: 2012     Years since quittin.1     Passive exposure: Past    Smokeless tobacco: Never   Vaping Use    Vaping status: Never Used   Substance and Sexual Activity    Alcohol use: Yes     Alcohol/week: 1.0 standard drink of alcohol     Types: 1 Glasses of wine per week     Comment: occ    Drug use: Never    Sexual activity: Yes     Partners: Male     Birth control/protection: None           Objective   Physical Exam  Vitals and nursing note reviewed.   Constitutional:       Appearance: She is well-developed.   HENT:      Head: Normocephalic and atraumatic.      Right Ear: Tympanic membrane and ear canal normal.      Left Ear: Tympanic membrane and ear canal normal.      Nose: Nose normal.      Mouth/Throat:      Mouth: Mucous membranes are moist. No oral lesions.      Pharynx: Uvula midline. No pharyngeal swelling, oropharyngeal exudate or uvula swelling.      Comments: Mild erythema noted to the oropharynx without exudate or tonsillar abscess identified  Eyes:      Conjunctiva/sclera: Conjunctivae normal.      Pupils: Pupils are equal, round, and reactive to light.   Cardiovascular:      Rate and Rhythm: Normal rate and regular rhythm.      Heart sounds: Normal heart sounds.   Pulmonary:      Effort: Pulmonary effort is normal.      Breath sounds: Normal breath sounds.   Abdominal:      General: Bowel sounds are normal.       Palpations: Abdomen is soft.   Musculoskeletal:         General: Normal range of motion.      Cervical back: Normal range of motion and neck supple.   Skin:     General: Skin is warm and dry.   Neurological:      General: No focal deficit present.      Mental Status: She is alert and oriented to person, place, and time.   Psychiatric:         Mood and Affect: Mood normal.         Behavior: Behavior normal.         Procedures           ED Course                                                       Medical Decision Making  Patient is a 33-year-old female who presents to the ER with URI symptoms.  She states she began developing cough with congestion on Wednesday as well as a sore throat.  She has been taking over-the-counter Mucinex without relief of symptoms.  She also has lost sense of taste.  She has had no known fevers.  She reports body aches and headache symptoms.  She states she just feels poorly all over.  She denies any nausea, vomiting, or diarrhea.  She states that she has been exposed to others who have tested positive for COVID and RSV.  Past medical history significant for ADD, hypertension, UTIs    Differential diagnosis includes but not limited to viral illness, strep throat, and other etiologies    Labs Reviewed  RESPIRATORY PANEL PCR W/ COVID-19 (SARS-COV-2), NP SWAB IN UTM/VTP, 2 HR TAT - Abnormal; Notable for the following components:     Human Rhinovirus/Enterovirus   Detected (*)            All other components within normal limits         Narrative: In the setting of a positive respiratory panel with a viral infection PLUS a negative procalcitonin without other underlying concern for bacterial infection, consider observing off antibiotics or discontinuation of antibiotics and continue supportive care. If the respiratory panel is positive for atypical bacterial infection (Bordetella pertussis, Chlamydophila pneumoniae, or Mycoplasma pneumoniae), consider antibiotic de-escalation to target  atypical bacterial infection.  RAPID STREP A SCREEN - Normal  BETA HEMOLYTIC STREP CULTURE, THROAT  RAINBOW URINE     Rapid strep was negative.  Respiratory panel was positive for rhinovirus.  We will treat accordingly.  She will be discharged home shortly in stable condition.    Problems Addressed:  Rhinovirus: acute illness or injury    Amount and/or Complexity of Data Reviewed  Labs: ordered. Decision-making details documented in ED Course.    Risk  OTC drugs.  Prescription drug management.        Final diagnoses:   Rhinovirus       ED Disposition  ED Disposition       ED Disposition   Discharge    Condition   Good    Comment   --               No follow-up provider specified.       Medication List        New Prescriptions      benzonatate 200 MG capsule  Commonly known as: TESSALON  Take 1 capsule by mouth 3 (Three) Times a Day As Needed for Cough.     cetirizine-pseudoephedrine 5-120 MG per 12 hr tablet  Commonly known as: ZyrTEC-D  Take 1 tablet by mouth 2 (Two) Times a Day.               Where to Get Your Medications        These medications were sent to [facility] - [place], [place] - [address]  - [phone]  - [phone] FX  [address] [facility] [address]      Phone: [phone]   benzonatate 200 MG capsule  cetirizine-pseudoephedrine 5-120 MG per 12 hr tablet  fluticasone 50 MCG/ACT nasal spray            Oly Mckeon, APRN  11/16/24 1530

## 2024-11-18 ENCOUNTER — NURSE TRIAGE (OUTPATIENT)
Dept: CALL CENTER | Facility: HOSPITAL | Age: 34
End: 2024-11-18
Payer: COMMERCIAL

## 2024-11-18 LAB — BACTERIA SPEC AEROBE CULT: NORMAL

## 2024-11-18 NOTE — TELEPHONE ENCOUNTER
I was seen in ER diagnosed Rhino virus, 11/16, I am getting worse, chest congested, coughing head off, body hurts, all over, taste and smell comes and goes, have not checked temp. I am taking allergy med, cough meds not filled ones they ordered . I have coughing spells cannot stop, what to do?  Triage done. Told to be seen again, UC , PCP or ER,  I am coughing all night cannot sleep. I have not contacted my PCP,Mariaa Alejandre, she is my pcp. I am having sob now. Feeling so much, coughing so hard cannot breathe. Triage done told to be seen again. Do follow up PCP  Reason for Disposition   Patient sounds very sick or weak to the triager    Additional Information   Negative: SEVERE difficulty breathing (e.g., struggling for each breath, speaks in single words)   Negative: Bluish (or gray) lips or face now   Negative: [1] Rapid onset of cough AND [2] has hives   Negative: Coughing started suddenly after medicine, an allergic food or bee sting   Negative: [1] Difficulty breathing AND [2] exposure to flames, smoke, or fumes   Negative: [1] Stridor AND [2] difficulty breathing   Negative: Sounds like a life-threatening emergency to the triager   Negative: Choked on object of food that could be caught in the throat   Negative: Chest pain is main symptom   Negative: [1] Previous asthma attacks AND [2] this feels like asthma attack   Negative: Cough lasts > 3 weeks   Negative: Wet cough (productive; white-yellow, yellow, green, or german colored sputum)   Negative: [1] Dry cough (non-productive;  no sputum or minimal clear sputum) AND [2] within 14 days of COVID-19 Exposure   Negative: [1] MODERATE difficulty breathing (e.g., speaks in phrases, SOB even at rest, pulse 100-120) AND [2] still present when not coughing   Negative: Chest pain  (Exception: MILD central chest pain, present only when coughing.)   Negative: [1] MILD difficulty breathing (e.g., minimal/no SOB at rest, SOB with walking, pulse <100) AND [2] still  "present when not coughing   Negative: [1] Coughed up blood AND [2] > 1 tablespoon (15 ml)   (Exception: Blood-tinged sputum.)   Negative: Fever > 103 F (39.4 C)   Negative: [1] Fever > 101 F (38.3 C) AND [2] age > 60 years   Negative: [1] Fever > 100.0 F (37.8 C) AND [2] bedridden (e.g., CVA, chronic illness, recovering from surgery)   Negative: [1] Fever > 100.0 F (37.8 C) AND [2] diabetes mellitus or weak immune system (e.g., HIV positive, cancer chemo, splenectomy, organ transplant, chronic steroids)   Negative: Wheezing is present   Negative: [1] Ankle swelling AND [2] swelling is increasing   Negative: SEVERE coughing spells (e.g., whooping sound after coughing, vomiting after coughing)   Negative: [1] Continuous (nonstop) coughing interferes with work or school AND [2] no improvement using cough treatment per Care Advice   Negative: Fever present > 3 days (72 hours)   Negative: [1] Fever returns after gone for over 24 hours AND [2] symptoms worse or not improved   Negative: [1] Using nasal washes and pain medicine > 24 hours AND [2] sinus pain (around cheekbone or eye) persists   Negative: Earache is present   Negative: Cough has been present for > 3 weeks   Negative: [1] Nasal discharge AND [2] present > 10 days   Negative: [1] Coughed up blood-tinged sputum AND [2] more than once   Negative: [1] Patient also has allergy symptoms (e.g., itchy eyes, clear nasal discharge, postnasal drip) AND [2] they are acting up    Answer Assessment - Initial Assessment Questions  1. ONSET: \"When did the cough begin?\"       Several days now so much worse  2. SEVERITY: \"How bad is the cough today?\"       Cannot stop coughing and cannot sleep  3. SPUTUM: \"Describe the color of your sputum\" (none, dry cough; clear, white, yellow, green)      yellos  4. HEMOPTYSIS: \"Are you coughing up any blood?\" If so ask: \"How much?\" (flecks, streaks, tablespoons, etc.)      no  5. DIFFICULTY BREATHING: \"Are you having difficulty breathing?\" " "If Yes, ask: \"How bad is it?\" (e.g., mild, moderate, severe)     - MILD: No SOB at rest, mild SOB with walking, speaks normally in sentences, can lie down, no retractions, pulse < 100.     - MODERATE: SOB at rest, SOB with minimal exertion and prefers to sit, cannot lie down flat, speaks in phrases, mild retractions, audible wheezing, pulse 100-120.     - SEVERE: Very SOB at rest, speaks in single words, struggling to breathe, sitting hunched forward, retractions, pulse > 120       Sob at times, stops breathing with coughing cannot catch breath  6. FEVER: \"Do you have a fever?\" If Yes, ask: \"What is your temperature, how was it measured, and when did it start?\"      no  7. CARDIAC HISTORY: \"Do you have any history of heart disease?\" (e.g., heart attack, congestive heart failure)       no  8. LUNG HISTORY: \"Do you have any history of lung disease?\"  (e.g., pulmonary embolus, asthma, emphysema)      no  9. PE RISK FACTORS: \"Do you have a history of blood clots?\" (or: recent major surgery, recent prolonged travel, bedridden)      no  10. OTHER SYMPTOMS: \"Do you have any other symptoms?\" (e.g., runny nose, wheezing, chest pain)        Cough, congestion, aching,   11. PREGNANCY: \"Is there any chance you are pregnant?\" \"When was your last menstrual period?\"        no  12. TRAVEL: \"Have you traveled out of the country in the last month?\" (e.g., travel history, exposures)        no    Protocols used: Cough - Acute Non-Productive-ADULT-AH    "

## 2024-11-19 ENCOUNTER — TELEMEDICINE (OUTPATIENT)
Dept: FAMILY MEDICINE CLINIC | Facility: CLINIC | Age: 34
End: 2024-11-19
Payer: COMMERCIAL

## 2024-11-19 VITALS — WEIGHT: 103 LBS | BODY MASS INDEX: 18.95 KG/M2 | HEIGHT: 62 IN

## 2024-11-19 DIAGNOSIS — F90.2 ATTENTION DEFICIT HYPERACTIVITY DISORDER (ADHD), COMBINED TYPE: Primary | ICD-10-CM

## 2024-11-19 DIAGNOSIS — G47.09 OTHER INSOMNIA: ICD-10-CM

## 2024-11-19 DIAGNOSIS — B34.8 RHINOVIRUS: ICD-10-CM

## 2024-11-19 DIAGNOSIS — J06.9 UPPER RESPIRATORY TRACT INFECTION, UNSPECIFIED TYPE: ICD-10-CM

## 2024-11-19 PROCEDURE — 99214 OFFICE O/P EST MOD 30 MIN: CPT | Performed by: NURSE PRACTITIONER

## 2024-11-19 RX ORDER — DEXTROAMPHETAMINE SACCHARATE, AMPHETAMINE ASPARTATE MONOHYDRATE, DEXTROAMPHETAMINE SULFATE AND AMPHETAMINE SULFATE 7.5; 7.5; 7.5; 7.5 MG/1; MG/1; MG/1; MG/1
30 CAPSULE, EXTENDED RELEASE ORAL EVERY MORNING
Qty: 30 CAPSULE | Refills: 0 | Status: SHIPPED | OUTPATIENT
Start: 2024-12-17 | End: 2025-01-16

## 2024-11-19 RX ORDER — DEXTROAMPHETAMINE SACCHARATE, AMPHETAMINE ASPARTATE MONOHYDRATE, DEXTROAMPHETAMINE SULFATE AND AMPHETAMINE SULFATE 7.5; 7.5; 7.5; 7.5 MG/1; MG/1; MG/1; MG/1
30 CAPSULE, EXTENDED RELEASE ORAL EVERY MORNING
Qty: 30 CAPSULE | Refills: 0 | Status: SHIPPED | OUTPATIENT
Start: 2024-11-19 | End: 2024-12-19

## 2024-11-19 RX ORDER — CEFUROXIME AXETIL 500 MG/1
500 TABLET ORAL 2 TIMES DAILY
Qty: 20 TABLET | Refills: 0 | Status: SHIPPED | OUTPATIENT
Start: 2024-11-19

## 2024-11-19 RX ORDER — METHYLPREDNISOLONE 4 MG/1
TABLET ORAL
Qty: 1 EACH | Refills: 0 | Status: SHIPPED | OUTPATIENT
Start: 2024-11-19

## 2024-11-19 RX ORDER — CLONIDINE HYDROCHLORIDE 0.1 MG/1
0.1 TABLET ORAL 3 TIMES DAILY PRN
Qty: 90 TABLET | Refills: 0 | Status: SHIPPED | OUTPATIENT
Start: 2024-11-19

## 2024-11-19 RX ORDER — DEXTROMETHORPHAN HYDROBROMIDE AND PROMETHAZINE HYDROCHLORIDE 15; 6.25 MG/5ML; MG/5ML
5 SYRUP ORAL 4 TIMES DAILY PRN
Qty: 180 ML | Refills: 0 | Status: SHIPPED | OUTPATIENT
Start: 2024-11-19

## 2024-11-19 NOTE — PROGRESS NOTES
"You have chosen to receive care through a telehealth visit.  Do you consent to use a video/audio connection for your medical care today? Yes     Chief Complaint  ADHD and URI    Subjective    History of Present Illness      Patient presents to [facility] for   History of Present Illness  Patient went to ER over the weekend for lost of taste and smell, fever, and cough. She tested positive for Rhinovirus. She was sent home with allergy medication and cough pearls. Patient states that she is not feeling any better.   Patient is also needing a refil on her ADHD medication. She states that she is doing well with this.        Objective   Vital Signs:   Ht 157.5 cm (62\")   Wt 46.7 kg (103 lb)   BMI 18.84 kg/m²       Physical Exam  Vitals and nursing note reviewed.   Constitutional:       Appearance: Normal appearance. She is well-developed.   HENT:      Head: Normocephalic and atraumatic.      Mouth/Throat:      Lips: Pink. No lesions.   Eyes:      General: Lids are normal. Vision grossly intact.      Conjunctiva/sclera: Conjunctivae normal.      Right eye: Right conjunctiva is not injected.      Left eye: Left conjunctiva is not injected.   Pulmonary:      Effort: Pulmonary effort is normal.   Musculoskeletal:         General: Normal range of motion.      Cervical back: Full passive range of motion without pain, normal range of motion and neck supple.   Skin:     General: Skin is dry.   Neurological:      Mental Status: She is alert and oriented to person, place, and time.      Motor: Motor function is intact.   Psychiatric:         Mood and Affect: Mood and affect normal.         Judgment: Judgment normal.                  Result Review  Data Reviewed:                   Assessment and Plan    Problem List Items Addressed This Visit       Attention deficit hyperactivity disorder (ADHD), combined type - Primary    Other insomnia    Relevant Medications    cloNIDine (CATAPRES) 0.1 MG tablet "     Other Visit Diagnoses       Rhinovirus        Upper respiratory tract infection, unspecified type        Relevant Medications    promethazine-dextromethorphan (PROMETHAZINE-DM) 6.25-15 MG/5ML syrup    methylPREDNISolone (MEDROL) 4 MG dose pack    cefuroxime (CEFTIN) 500 MG tablet          Plan  Refill adderall xr 30 mg ( needs UDS at next visit)  Cough med  Medrol dose pack and ceftin for URI  ADHD Follow up:    PDMP reviewed and is clean. ADRS (Adult ADHD Assessment Form) reviewed in detail, scanned in chart, and has been reviewed at time of appointment.  All questions, including medication and side effects, were discussed in detail at time of patient's visit. Patient will continue same medication which was discussed at today's visit. Patient is to return in 3 month(s).    Last Urine Toxicity  More data exists         Latest Ref Rng & Units 12/4/2023 11/28/2022   LAST URINE TOXICITY RESULTS   Amphetamine, Urine Qual Negative Negative  Positive    Barbiturates Screen, Urine Negative Negative  Negative    Benzodiazepine Screen, Urine Negative Negative  Negative    Buprenorphine, Screen, Urine Negative Negative  Negative    Cocaine Screen, Urine Negative Negative  Negative    Fentanyl, Urine Negative Negative  -   Methadone Screen , Urine Negative Negative  Negative    Methamphetamine, Ur Negative Negative  Negative       Details                    Urine Drug Screen Results: UDS RESULTS: Current results appropriate    CSA completed on: 7/29/24          Follow Up   No follow-ups on file.  Patient was given instructions and counseling regarding her condition or for health maintenance advice. Please see specific information pulled into the AVS if appropriate.

## 2025-01-07 DIAGNOSIS — F90.2 ATTENTION DEFICIT HYPERACTIVITY DISORDER (ADHD), COMBINED TYPE: ICD-10-CM

## 2025-01-07 RX ORDER — DEXTROAMPHETAMINE SULFATE, DEXTROAMPHETAMINE SACCHARATE, AMPHETAMINE SULFATE AND AMPHETAMINE ASPARTATE 7.5; 7.5; 7.5; 7.5 MG/1; MG/1; MG/1; MG/1
CAPSULE, EXTENDED RELEASE ORAL
Qty: 30 CAPSULE | Refills: 0 | Status: SHIPPED | OUTPATIENT
Start: 2025-01-14

## 2025-01-11 ENCOUNTER — APPOINTMENT (OUTPATIENT)
Dept: GENERAL RADIOLOGY | Facility: HOSPITAL | Age: 35
End: 2025-01-11
Payer: COMMERCIAL

## 2025-01-11 ENCOUNTER — HOSPITAL ENCOUNTER (EMERGENCY)
Facility: HOSPITAL | Age: 35
Discharge: HOME | End: 2025-01-11
Payer: COMMERCIAL

## 2025-01-11 ENCOUNTER — NURSE TRIAGE (OUTPATIENT)
Dept: CALL CENTER | Facility: HOSPITAL | Age: 35
End: 2025-01-11
Payer: COMMERCIAL

## 2025-01-11 VITALS
WEIGHT: 108.31 LBS | BODY MASS INDEX: 19.19 KG/M2 | SYSTOLIC BLOOD PRESSURE: 128 MMHG | DIASTOLIC BLOOD PRESSURE: 87 MMHG | HEIGHT: 63 IN | RESPIRATION RATE: 18 BRPM | HEART RATE: 70 BPM | OXYGEN SATURATION: 100 % | TEMPERATURE: 98 F

## 2025-01-11 DIAGNOSIS — U07.1 COVID-19: Primary | ICD-10-CM

## 2025-01-11 LAB
ALBUMIN SERPL-MCNC: 4.2 G/DL (ref 3.5–5.2)
ALBUMIN/GLOB SERPL: 1.4 G/DL
ALP SERPL-CCNC: 55 U/L (ref 39–117)
ALT SERPL W P-5'-P-CCNC: 19 U/L (ref 1–33)
ANION GAP SERPL CALCULATED.3IONS-SCNC: 9 MMOL/L (ref 5–15)
AST SERPL-CCNC: 17 U/L (ref 1–32)
B PARAPERT DNA SPEC QL NAA+PROBE: NOT DETECTED
B PERT DNA SPEC QL NAA+PROBE: NOT DETECTED
B-HCG UR QL: NEGATIVE
BASOPHILS # BLD AUTO: 0.01 10*3/MM3 (ref 0–0.2)
BASOPHILS NFR BLD AUTO: 0.3 % (ref 0–1.5)
BILIRUB SERPL-MCNC: 0.2 MG/DL (ref 0–1.2)
BUN SERPL-MCNC: 7 MG/DL (ref 6–20)
BUN/CREAT SERPL: 14.3 (ref 7–25)
C PNEUM DNA NPH QL NAA+NON-PROBE: NOT DETECTED
CALCIUM SPEC-SCNC: 8.8 MG/DL (ref 8.6–10.5)
CHLORIDE SERPL-SCNC: 104 MMOL/L (ref 98–107)
CO2 SERPL-SCNC: 26 MMOL/L (ref 22–29)
CREAT SERPL-MCNC: 0.49 MG/DL (ref 0.57–1)
D DIMER PPP FEU-MCNC: 0.34 MCGFEU/ML (ref 0–0.5)
DEPRECATED RDW RBC AUTO: 41 FL (ref 37–54)
EGFRCR SERPLBLD CKD-EPI 2021: 127 ML/MIN/1.73
EOSINOPHIL # BLD AUTO: 0.03 10*3/MM3 (ref 0–0.4)
EOSINOPHIL NFR BLD AUTO: 0.8 % (ref 0.3–6.2)
ERYTHROCYTE [DISTWIDTH] IN BLOOD BY AUTOMATED COUNT: 12 % (ref 12.3–15.4)
EXPIRATION DATE: NORMAL
FLUAV SUBTYP SPEC NAA+PROBE: NOT DETECTED
FLUBV RNA ISLT QL NAA+PROBE: NOT DETECTED
GEN 5 1HR TROPONIN T REFLEX: <6 NG/L
GLOBULIN UR ELPH-MCNC: 2.9 GM/DL
GLUCOSE SERPL-MCNC: 93 MG/DL (ref 65–99)
HADV DNA SPEC NAA+PROBE: NOT DETECTED
HCOV 229E RNA SPEC QL NAA+PROBE: NOT DETECTED
HCOV HKU1 RNA SPEC QL NAA+PROBE: NOT DETECTED
HCOV NL63 RNA SPEC QL NAA+PROBE: NOT DETECTED
HCOV OC43 RNA SPEC QL NAA+PROBE: NOT DETECTED
HCT VFR BLD AUTO: 38.5 % (ref 34–46.6)
HETEROPH AB SER QL LA: NEGATIVE
HGB BLD-MCNC: 12.6 G/DL (ref 12–15.9)
HMPV RNA NPH QL NAA+NON-PROBE: NOT DETECTED
HPIV1 RNA ISLT QL NAA+PROBE: NOT DETECTED
HPIV2 RNA SPEC QL NAA+PROBE: NOT DETECTED
HPIV3 RNA NPH QL NAA+PROBE: NOT DETECTED
HPIV4 P GENE NPH QL NAA+PROBE: NOT DETECTED
IMM GRANULOCYTES # BLD AUTO: 0.01 10*3/MM3 (ref 0–0.05)
IMM GRANULOCYTES NFR BLD AUTO: 0.3 % (ref 0–0.5)
INTERNAL NEGATIVE CONTROL: NEGATIVE
INTERNAL POSITIVE CONTROL: POSITIVE
LYMPHOCYTES # BLD AUTO: 1.45 10*3/MM3 (ref 0.7–3.1)
LYMPHOCYTES NFR BLD AUTO: 36.3 % (ref 19.6–45.3)
Lab: NORMAL
M PNEUMO IGG SER IA-ACNC: NOT DETECTED
MCH RBC QN AUTO: 30.1 PG (ref 26.6–33)
MCHC RBC AUTO-ENTMCNC: 32.7 G/DL (ref 31.5–35.7)
MCV RBC AUTO: 92.1 FL (ref 79–97)
MONOCYTES # BLD AUTO: 0.3 10*3/MM3 (ref 0.1–0.9)
MONOCYTES NFR BLD AUTO: 7.5 % (ref 5–12)
NEUTROPHILS NFR BLD AUTO: 2.19 10*3/MM3 (ref 1.7–7)
NEUTROPHILS NFR BLD AUTO: 54.8 % (ref 42.7–76)
NRBC BLD AUTO-RTO: 0 /100 WBC (ref 0–0.2)
PLATELET # BLD AUTO: 225 10*3/MM3 (ref 140–450)
PMV BLD AUTO: 10.3 FL (ref 6–12)
POTASSIUM SERPL-SCNC: 4 MMOL/L (ref 3.5–5.2)
PROT SERPL-MCNC: 7.1 G/DL (ref 6–8.5)
RBC # BLD AUTO: 4.18 10*6/MM3 (ref 3.77–5.28)
RHINOVIRUS RNA SPEC NAA+PROBE: NOT DETECTED
RSV RNA NPH QL NAA+NON-PROBE: NOT DETECTED
S PYO AG THROAT QL: NEGATIVE
SARS-COV-2 RNA RESP QL NAA+PROBE: DETECTED
SODIUM SERPL-SCNC: 139 MMOL/L (ref 136–145)
TROPONIN T NUMERIC DELTA: NORMAL
TROPONIN T SERPL HS-MCNC: <6 NG/L
WBC NRBC COR # BLD AUTO: 3.99 10*3/MM3 (ref 3.4–10.8)

## 2025-01-11 PROCEDURE — 85025 COMPLETE CBC W/AUTO DIFF WBC: CPT | Performed by: PHYSICIAN ASSISTANT

## 2025-01-11 PROCEDURE — 85379 FIBRIN DEGRADATION QUANT: CPT | Performed by: PHYSICIAN ASSISTANT

## 2025-01-11 PROCEDURE — 0202U NFCT DS 22 TRGT SARS-COV-2: CPT | Performed by: PHYSICIAN ASSISTANT

## 2025-01-11 PROCEDURE — 71046 X-RAY EXAM CHEST 2 VIEWS: CPT

## 2025-01-11 PROCEDURE — 25810000003 LACTATED RINGERS SOLUTION: Performed by: PHYSICIAN ASSISTANT

## 2025-01-11 PROCEDURE — 81025 URINE PREGNANCY TEST: CPT | Performed by: PHYSICIAN ASSISTANT

## 2025-01-11 PROCEDURE — 87081 CULTURE SCREEN ONLY: CPT | Performed by: PHYSICIAN ASSISTANT

## 2025-01-11 PROCEDURE — 99284 EMERGENCY DEPT VISIT MOD MDM: CPT

## 2025-01-11 PROCEDURE — 86308 HETEROPHILE ANTIBODY SCREEN: CPT | Performed by: PHYSICIAN ASSISTANT

## 2025-01-11 PROCEDURE — 87880 STREP A ASSAY W/OPTIC: CPT | Performed by: PHYSICIAN ASSISTANT

## 2025-01-11 PROCEDURE — 84484 ASSAY OF TROPONIN QUANT: CPT | Performed by: PHYSICIAN ASSISTANT

## 2025-01-11 PROCEDURE — 36415 COLL VENOUS BLD VENIPUNCTURE: CPT

## 2025-01-11 PROCEDURE — 93005 ELECTROCARDIOGRAM TRACING: CPT | Performed by: PHYSICIAN ASSISTANT

## 2025-01-11 PROCEDURE — 80053 COMPREHEN METABOLIC PANEL: CPT | Performed by: PHYSICIAN ASSISTANT

## 2025-01-11 RX ORDER — BENZONATATE 200 MG/1
200 CAPSULE ORAL 3 TIMES DAILY PRN
Qty: 21 CAPSULE | Refills: 0 | Status: SHIPPED | OUTPATIENT
Start: 2025-01-11

## 2025-01-11 RX ORDER — FLUTICASONE PROPIONATE 50 MCG
2 SPRAY, SUSPENSION (ML) NASAL DAILY
Qty: 9.9 G | Refills: 0 | Status: SHIPPED | OUTPATIENT
Start: 2025-01-11

## 2025-01-11 RX ORDER — CETIRIZINE HYDROCHLORIDE, PSEUDOEPHEDRINE HYDROCHLORIDE 5; 120 MG/1; MG/1
1 TABLET, FILM COATED, EXTENDED RELEASE ORAL 2 TIMES DAILY
Qty: 15 TABLET | Refills: 0 | Status: SHIPPED | OUTPATIENT
Start: 2025-01-11

## 2025-01-11 RX ADMIN — SODIUM CHLORIDE, POTASSIUM CHLORIDE, SODIUM LACTATE AND CALCIUM CHLORIDE 1000 ML: 600; 310; 30; 20 INJECTION, SOLUTION INTRAVENOUS at 10:12

## 2025-01-11 NOTE — TELEPHONE ENCOUNTER
"Reason for Disposition   Continuous (nonstop) coughing interferes with work, school, or sleeping    Additional Information   Negative: SEVERE difficulty breathing (e.g., struggling for each breath, speaks in single words)   Negative: [1] Breathing stopped AND [2] hasn't returned   Negative: Choking on something   Negative: Bluish (or gray) lips or face now   Negative: Difficult to awaken or acting confused (e.g., disoriented, slurred speech)   Negative: Passed out (i.e., lost consciousness, collapsed and was not responding)   Negative: Wheezing started suddenly after medicine, an allergic food or bee sting   Negative: Stridor (harsh sound while breathing in)   Negative: Slow, shallow and weak breathing   Negative: Sounds like a life-threatening emergency to the triager   Negative: Chest pain   Negative: [1] Wheezing (high pitched whistling sound) AND [2] previous asthma attacks or use of asthma medicines   Negative: [1] Difficulty breathing AND [2] only present when coughing   Negative: [1] Difficulty breathing AND [2] only from stuffy or runny nose   Negative: [1] Difficulty breathing AND [2] within 14 days of COVID-19 Exposure   Negative: [1] MODERATE difficulty breathing (e.g., speaks in phrases, SOB even at rest, pulse 100-120) AND [2] NEW-onset or WORSE than normal   Negative: Oxygen level (e.g., pulse oximetry) 90 percent or lower   Negative: Wheezing can be heard across the room   Negative: Drooling or spitting out saliva (because can't swallow)   Negative: History of prior \"blood clot\" in leg or lungs (i.e., deep vein thrombosis, pulmonary embolism)   Negative: History of inherited increased risk of blood clots (e.g., Factor 5 Leiden, Anti-thrombin 3, Protein C or Protein S deficiency, Prothrombin mutation)   Negative: Major surgery in the past month   Negative: Hip or leg fracture (broken bone) in past month (or had cast on leg or ankle in past month)   Negative: Illness requiring prolonged bedrest in past " "month (e.g., immobilization, long hospital stay)   Negative: Long-distance travel in past month (e.g., car, bus, train, plane; with trip lasting 6 or more hours)   Negative: Cancer treatment in past six months (or has cancer now)   Negative: Extra heartbeats, irregular heart beating, or heart is beating very fast  (i.e., \"palpitations\")   Negative: Fever > 103 F (39.4 C)   Negative: [1] Fever > 101 F (38.3 C) AND [2] age > 60 years   Negative: [1] Fever > 100.0 F (37.8 C) AND [2] bedridden (e.g., CVA, chronic illness, recovering from surgery)   Negative: [1] Fever > 100.0 F (37.8 C) AND [2] diabetes mellitus or weak immune system (e.g., HIV positive, cancer chemo, splenectomy, organ transplant, chronic steroids)   Negative: [1] Periods where breathing stops and then resumes normally AND [2] bedridden (e.g., CVA)   Negative: Pregnant or postpartum (from 0 to 6 weeks after delivery)   Negative: Patient sounds very sick or weak to the triager   Negative: [1] MILD difficulty breathing (e.g., minimal/no SOB at rest, SOB with walking, pulse <100) AND [2] NEW-onset or WORSE than normal   Negative: [1] Longstanding difficulty breathing (e.g., CHF, COPD, emphysema) AND [2] WORSE than normal   Negative: [1] Longstanding difficulty breathing AND [2] not responding to usual therapy    Answer Assessment - Initial Assessment Questions  1. RESPIRATORY STATUS: \"Describe your breathing?\" (e.g., wheezing, shortness of breath, unable to speak, severe coughing)       Soa   2. ONSET: \"When did this breathing problem begin?\"       Two days ago  3. PATTERN \"Does the difficult breathing come and go, or has it been constant since it started?\"       Getting worse  4. SEVERITY: \"How bad is your breathing?\" (e.g., mild, moderate, severe)     - MILD: No SOB at rest, mild SOB with walking, speaks normally in sentences, can lie down, no retractions, pulse < 100.     - MODERATE: SOB at rest, SOB with minimal exertion and prefers to sit, cannot lie " "down flat, speaks in phrases, mild retractions, audible wheezing, pulse 100-120.     - SEVERE: Very SOB at rest, speaks in single words, struggling to breathe, sitting hunched forward, retractions, pulse > 120       moderate  5. RECURRENT SYMPTOM: \"Have you had difficulty breathing before?\" If Yes, ask: \"When was the last time?\" and \"What happened that time?\"       Cough, congestion   6. CARDIAC HISTORY: \"Do you have any history of heart disease?\" (e.g., heart attack, angina, bypass surgery, angioplasty)       denies  7. LUNG HISTORY: \"Do you have any history of lung disease?\"  (e.g., pulmonary embolus, asthma, emphysema)      denies  8. CAUSE: \"What do you think is causing the breathing problem?\"       Questionable pneumonia  9. OTHER SYMPTOMS: \"Do you have any other symptoms? (e.g., dizziness, runny nose, cough, chest pain, fever)      Congestion cough   10. O2 SATURATION MONITOR:  \"Do you use an oxygen saturation monitor (pulse oximeter) at home?\" If Yes, ask: \"What is your reading (oxygen level) today?\" \"What is your usual oxygen saturation reading?\" (e.g., 95%)        unknown  11. PREGNANCY: \"Is there any chance you are pregnant?\" \"When was your last menstrual period?\"        no  12. TRAVEL: \"Have you traveled out of the country in the last month?\" (e.g., travel history, exposures)        no    Protocols used: Breathing Difficulty-ADULT-AH  Patient going to [facility] or er since had visit on 1/7 and still not better    "

## 2025-01-11 NOTE — ED PROVIDER NOTES
Subjective   History of Present Illness    Patient is a 34-year-old female chief complaint of not feeling well with head congestion, ear pain, sore throat, cough, generalized bodyaches and chills.    Patient describes her symptoms began about a week ago.  It started with her sinuses, head congestion, ear pain and then progressed to sore throat, and bloody productive cough.  She does complain of left lower chest wall pain with deep inspiration and cough.  She sought medical attention at [facility] a couple days after symptoms began.  She describes receiving a shot of Rocephin and steroids.  She denies any prescriptive medications.  She reports her symptoms have worsened and she feels terrible.  She is concerned that she is dehydrated.  Because of this, she came to the ER to be further evaluated.    Patient denies smoking, asthma, or history of pneumonia.  She denies any underlying pulmonary issues.    Review of Systems   Constitutional:  Positive for activity change, appetite change and fatigue. Negative for fever.   HENT:  Positive for congestion, ear pain, sinus pressure, sinus pain and sore throat.    Respiratory:  Positive for cough and shortness of breath.    Cardiovascular:  Positive for chest pain.   Musculoskeletal:  Positive for arthralgias and myalgias.       Past Medical History:   Diagnosis Date    Abnormal Pap smear of cervix     ADD (attention deficit disorder)     Fracture of rib of left side     Hypertension     Seasonal allergies     UTI (urinary tract infection)        Allergies   Allergen Reactions    Acetaminophen-Codeine Hives    Codeine Rash       Past Surgical History:   Procedure Laterality Date    BREAST SURGERY      AUGMENTATION    BREAST SURGERY      REDO OF PREVIOUS SURGERY    CYST REMOVAL      LEEP N/A 7/27/2023    Procedure: LOOP ELECTROSURGERY EXCISION PROCEDURE;  Surgeon: Lissett Belle MD;  Location: [facility];  Service: Obstetrics/Gynecology;  Laterality: N/A;     SEPTOPLASTY, RESECTION INFERIOR TURBINATES Bilateral 2021    Procedure: septoplasty with turbinate reduction;  Surgeon: Mart Betts Jr., MD;  Location: [facility];  Service: ENT;  Laterality: Bilateral;       Family History   Problem Relation Age of Onset    Gout Father     Breast cancer Paternal Grandmother 50    Cancer Maternal Grandmother         breast    Diabetes Maternal Grandmother     Breast cancer Paternal Aunt        Social History     Socioeconomic History    Marital status: Single   Tobacco Use    Smoking status: Former     Current packs/day: 0.00     Average packs/day: 0.3 packs/day for 10.0 years (2.5 ttl pk-yrs)     Types: Cigarettes     Start date: 2002     Quit date: 2012     Years since quittin.3     Passive exposure: Past    Smokeless tobacco: Never   Vaping Use    Vaping status: Never Used   Substance and Sexual Activity    Alcohol use: Yes     Alcohol/week: 1.0 standard drink of alcohol     Types: 1 Glasses of wine per week     Comment: occ    Drug use: Never    Sexual activity: Yes     Partners: Male     Birth control/protection: None       Prior to Admission medications    Medication Sig Start Date End Date Taking? Authorizing Provider   Adderall XR 30 MG 24 hr capsule TAKE 1 Capsule BY MOUTH EVERY IN THE MORNING 25   Kris Valentine MD   benzonatate (TESSALON) 200 MG capsule Take 1 capsule by mouth 3 (Three) Times a Day As Needed for Cough. 24   Oly Mckeon APRN   cefuroxime (CEFTIN) 500 MG tablet Take 1 tablet by mouth 2 (Two) Times a Day. 24   Mariaa Alejandre APRN   cetirizine-pseudoephedrine (ZyrTEC-D) 5-120 MG per 12 hr tablet Take 1 tablet by mouth 2 (Two) Times a Day. 24   Oly Mckeon APRN   cloNIDine (CATAPRES) 0.1 MG tablet Take 1 tablet by mouth 3 (Three) Times a Day As Needed for High Blood Pressure. 24   Mariaa Alejandre APRN   fluticasone (FLONASE) 50 MCG/ACT nasal spray Administer 2 sprays  "into the nostril(s) as directed by provider Daily. 11/16/24   Oly Mckeon APRN   methylPREDNISolone (MEDROL) 4 MG dose pack Take as directed on package instructions. 11/19/24   Mariaa Alejandre APRN   promethazine-dextromethorphan (PROMETHAZINE-DM) 6.25-15 MG/5ML syrup Take 5 mL by mouth 4 (Four) Times a Day As Needed for Cough. 11/19/24   Mariaa Alejandre APRN   valACYclovir (VALTREX) 500 MG tablet Take 1 tablet by mouth Daily. 7/8/24   Hank Aj MD       Medications   lactated ringers bolus 1,000 mL (1,000 mL Intravenous New Bag 1/11/25 1012)       /74   Pulse 95   Temp 98 °F (36.7 °C) (Oral)   Resp 18   Ht 160 cm (63\")   Wt 49.1 kg (108 lb 5 oz)   LMP 01/06/2025 (Exact Date)   SpO2 100%   BMI 19.19 kg/m²       Objective   Physical Exam  Vitals and nursing note reviewed.   Constitutional:       General: She is not in acute distress.     Appearance: Normal appearance. She is well-developed. She is not diaphoretic.   HENT:      Head: Normocephalic and atraumatic.      Jaw: There is normal jaw occlusion.      Right Ear: Tympanic membrane normal.      Left Ear: Tympanic membrane normal.      Nose: Nose normal.      Mouth/Throat:      Lips: Pink.      Mouth: Mucous membranes are moist.      Pharynx: Oropharynx is clear. No pharyngeal swelling, oropharyngeal exudate, posterior oropharyngeal erythema, uvula swelling or postnasal drip.   Eyes:      Extraocular Movements: Extraocular movements intact.      Conjunctiva/sclera: Conjunctivae normal.      Pupils: Pupils are equal, round, and reactive to light.   Neck:      Trachea: No tracheal deviation.      Comments: Tenderness to palpate on the anterior cervical lymph nodes and posterior cervical lymph nodes without any obvious enlargement.  Cardiovascular:      Rate and Rhythm: Normal rate and regular rhythm.      Heart sounds: Normal heart sounds. No murmur heard.  Pulmonary:      Effort: Pulmonary effort is normal. No " respiratory distress.      Breath sounds: Normal breath sounds. No stridor. No wheezing, rhonchi or rales.   Chest:      Chest wall: No tenderness.   Abdominal:      General: Bowel sounds are normal. There is no distension.      Palpations: Abdomen is soft. There is no mass.      Tenderness: There is no abdominal tenderness. There is no guarding or rebound.   Musculoskeletal:         General: Normal range of motion.      Cervical back: Full passive range of motion without pain, normal range of motion and neck supple. Tenderness present. No rigidity.   Skin:     General: Skin is warm and dry.      Capillary Refill: Capillary refill takes less than 2 seconds.   Neurological:      General: No focal deficit present.      Mental Status: She is alert and oriented to person, place, and time.   Psychiatric:         Attention and Perception: Attention normal.         Mood and Affect: Affect is flat.         Procedures         Lab Results (last 24 hours)       Procedure Component Value Units Date/Time    CBC & Differential [[id number]]  (Abnormal) Collected: 01/11/25 0953    Specimen: Blood Updated: 01/11/25 1007    Narrative:      The following orders were created for panel order CBC & Differential.  Procedure                               Abnormality         Status                     ---------                               -----------         ------                     CBC Auto Differential[[id number]]        Abnormal            Final result                 Please view results for these tests on the individual orders.    Comprehensive Metabolic Panel [[id number]]  (Abnormal) Collected: 01/11/25 0953    Specimen: Blood Updated: 01/11/25 1025     Glucose 93 mg/dL      BUN 7 mg/dL      Creatinine 0.49 mg/dL      Sodium 139 mmol/L      Potassium 4.0 mmol/L      Chloride 104 mmol/L      CO2 26.0 mmol/L      Calcium 8.8 mg/dL      Total Protein 7.1 g/dL      Albumin 4.2 g/dL      ALT (SGPT) 19 U/L      AST (SGOT) 17 U/L       "Alkaline Phosphatase 55 U/L      Total Bilirubin 0.2 mg/dL      Globulin 2.9 gm/dL      A/G Ratio 1.4 g/dL      BUN/Creatinine Ratio 14.3     Anion Gap 9.0 mmol/L      eGFR 127.0 mL/min/1.73     Narrative:      GFR Categories in Chronic Kidney Disease (CKD)      GFR Category          GFR (mL/min/1.73)    Interpretation  G1                     90 or greater         Normal or high (1)  G2                      60-89                Mild decrease (1)  G3a                   45-59                Mild to moderate decrease  G3b                   30-44                Moderate to severe decrease  G4                    15-29                Severe decrease  G5                    14 or less           Kidney failure          (1)In the absence of evidence of kidney disease, neither GFR category G1 or G2 fulfill the criteria for CKD.    eGFR calculation 2021 CKD-EPI creatinine equation, which does not include race as a factor    D-dimer, Quantitative [[id number]]  (Normal) Collected: 01/11/25 0953    Specimen: Blood Updated: 01/11/25 1022     D-Dimer, Quantitative 0.34 MCGFEU/mL     Narrative:      According to the assay 's published package insert, a normal (<0.50 MCGFEU/mL) D-dimer result in conjunction with a non-high clinical probability assessment, excludes deep vein thrombosis (DVT) and pulmonary embolism (PE) with high sensitivity.    D-dimer values increase with age and this can make VTE exclusion of an older population difficult. To address this, the [employer], based on best available evidence and recent guidelines, recommends that clinicians use age-adjusted D-dimer thresholds in patients greater than 50 years of age with: a) a low probability of PE who do not meet all Pulmonary Embolism Rule Out Criteria, or b) in those with intermediate probability of PE.   The formula for an age-adjusted D-dimer cut-off is \"age/100\".  For example, a 60 year old patient would have an age-adjusted cut-off " of 0.60 MCGFEU/mL and an 80 year old 0.80 MCGFEU/mL.    High Sensitivity Troponin T [[id number]]  (Normal) Collected: 01/11/25 0953    Specimen: Blood Updated: 01/11/25 1023     HS Troponin T <6 ng/L     Narrative:      High Sensitive Troponin T Reference Range:  <14.0 ng/L- Negative Female for AMI  <22.0 ng/L- Negative Male for AMI  >=14 - Abnormal Female indicating possible myocardial injury.  >=22 - Abnormal Male indicating possible myocardial injury.   Clinicians would have to utilize clinical acumen, EKG, Troponin, and serial changes to determine if it is an Acute Myocardial Infarction or myocardial injury due to an underlying chronic condition.         Mononucleosis Screen [[id number]]  (Normal) Collected: 01/11/25 0953    Specimen: Blood Updated: 01/11/25 1020     Monospot Negative    CBC Auto Differential [[id number]]  (Abnormal) Collected: 01/11/25 0953    Specimen: Blood Updated: 01/11/25 1007     WBC 3.99 10*3/mm3      RBC 4.18 10*6/mm3      Hemoglobin 12.6 g/dL      Hematocrit 38.5 %      MCV 92.1 fL      MCH 30.1 pg      MCHC 32.7 g/dL      RDW 12.0 %      RDW-SD 41.0 fl      MPV 10.3 fL      Platelets 225 10*3/mm3      Neutrophil % 54.8 %      Lymphocyte % 36.3 %      Monocyte % 7.5 %      Eosinophil % 0.8 %      Basophil % 0.3 %      Immature Grans % 0.3 %      Neutrophils, Absolute 2.19 10*3/mm3      Lymphocytes, Absolute 1.45 10*3/mm3      Monocytes, Absolute 0.30 10*3/mm3      Eosinophils, Absolute 0.03 10*3/mm3      Basophils, Absolute 0.01 10*3/mm3      Immature Grans, Absolute 0.01 10*3/mm3      nRBC 0.0 /100 WBC     Respiratory Panel PCR w/COVID-19(SARS-CoV-2) DANA/CELIA/AIDE/PAD/COR/MIROSLAVA In-House, NP Swab in [facility], 2 HR TAT - Swab, Nasopharynx [[id number]]  (Abnormal) Collected: 01/11/25 0955    Specimen: Swab from Nasopharynx Updated: 01/11/25 1113     ADENOVIRUS, PCR Not Detected     Coronavirus 229E Not Detected     Coronavirus HKU1 Not Detected     Coronavirus NL63 Not Detected     Coronavirus  OC43 Not Detected     COVID19 Detected     Human Metapneumovirus Not Detected     Human Rhinovirus/Enterovirus Not Detected     Influenza A PCR Not Detected     Influenza B PCR Not Detected     Parainfluenza Virus 1 Not Detected     Parainfluenza Virus 2 Not Detected     Parainfluenza Virus 3 Not Detected     Parainfluenza Virus 4 Not Detected     RSV, PCR Not Detected     Bordetella pertussis pcr Not Detected     Bordetella parapertussis PCR Not Detected     Chlamydophila pneumoniae PCR Not Detected     Mycoplasma pneumo by PCR Not Detected    Narrative:      In the setting of a positive respiratory panel with a viral infection PLUS a negative procalcitonin without other underlying concern for bacterial infection, consider observing off antibiotics or discontinuation of antibiotics and continue supportive care. If the respiratory panel is positive for atypical bacterial infection (Bordetella pertussis, Chlamydophila pneumoniae, or Mycoplasma pneumoniae), consider antibiotic de-escalation to target atypical bacterial infection.    POC Urine Pregnancy [[id number]]  (Normal) Collected: 01/11/25 1010    Specimen: Urine Updated: 01/11/25 1011     HCG, Urine, QL Negative     Lot Number [id number]     Internal Positive Control Positive     Internal Negative Control Negative     Expiration Date 1/28/2025    Rapid Strep A Screen - Swab, Throat [[id number]]  (Normal) Collected: 01/11/25 1012    Specimen: Swab from Throat Updated: 01/11/25 1034     Strep A Ag Negative    Beta Strep Culture, Throat - Swab, Throat [[id number]] Collected: 01/11/25 1012    Specimen: Swab from Throat Updated: 01/11/25 1034    High Sensitivity Troponin T 1Hr [[id number]] Collected: 01/11/25 1057    Specimen: Blood from Arm, Left Updated: 01/11/25 1132     HS Troponin T <6 ng/L      Troponin T Numeric Delta --     Comment: Unable to calculate.       Narrative:      High Sensitive Troponin T Reference Range:  <14.0 ng/L- Negative Female for AMI  <22.0 ng/L-  Negative Male for AMI  >=14 - Abnormal Female indicating possible myocardial injury.  >=22 - Abnormal Male indicating possible myocardial injury.   Clinicians would have to utilize clinical acumen, EKG, Troponin, and serial changes to determine if it is an Acute Myocardial Infarction or myocardial injury due to an underlying chronic condition.                 XR Chest 2 View    Result Date: 1/11/2025  Narrative: EXAMINATION:  XR CHEST 2 VW-  1/11/2025 8:45 AM  HISTORY: Coughing, chest pain and shortness of air.  COMPARISON: No comparison study.  TECHNIQUE: 2 views were obtained.  FINDINGS:  The lungs are expanded bilaterally. There is no airspace consolidation or pleural effusion. The heart is normal in size. There is no acute bony abnormality. There appears to be mild pectus excavating deformity of the sternum on the lateral image. There are bilateral breast implants.       Impression: No active disease is seen.    This report was signed and finalized on 1/11/2025 10:15 AM by Dr. Lucien Godoy MD.       ED Course  ED Course as of 01/11/25 1140   Sat Jan 11, 2025   1138 Patient has been educated that she does have Covid-19.  She does not have evidence of pneumonia based on chest x-ray as read by radiologist.  Cardiac work was unremarkable and she does not have any complication such as a thrombus.  D-dimer is negative.  She is not hypoxic.  Patient has been educated to rest and treat this supportively.  Follow with primary care provider.  Strict return precaution advised.  She will be discharged in stable condition. [TK]      ED Course User Index  [TK] Rosetta Napier PA          [facility]      Final diagnoses:   COVID-19       Disposition: Patient will be discharged in stable condition.     Rosetta Napier PA  01/11/25 1140

## 2025-01-11 NOTE — Clinical Note
[facility] EMERGENCY DEPARTMENT  [address]  [facility] [address]  Phone: [phone]    Laura Morales was seen and treated in our emergency department on 1/11/2025.  She may return to work on 01/13/2025.         Thank you for choosing [facility].    Rosetta Napier PA

## 2025-01-12 LAB
QT INTERVAL: 390 MS
QTC INTERVAL: 447 MS

## 2025-01-13 LAB — BACTERIA SPEC AEROBE CULT: NORMAL

## 2025-02-26 ENCOUNTER — TELEPHONE (OUTPATIENT)
Dept: FAMILY MEDICINE CLINIC | Facility: CLINIC | Age: 35
End: 2025-02-26
Payer: COMMERCIAL

## 2025-02-26 ENCOUNTER — HOSPITAL ENCOUNTER (OUTPATIENT)
Dept: GENERAL RADIOLOGY | Facility: HOSPITAL | Age: 35
Discharge: HOME | End: 2025-02-26
Payer: COMMERCIAL

## 2025-02-26 ENCOUNTER — LAB (OUTPATIENT)
Dept: LAB | Facility: HOSPITAL | Age: 35
End: 2025-02-26
Payer: COMMERCIAL

## 2025-02-26 ENCOUNTER — OFFICE VISIT (OUTPATIENT)
Dept: FAMILY MEDICINE CLINIC | Facility: CLINIC | Age: 35
End: 2025-02-26
Payer: COMMERCIAL

## 2025-02-26 VITALS
RESPIRATION RATE: 18 BRPM | BODY MASS INDEX: 19.03 KG/M2 | OXYGEN SATURATION: 98 % | WEIGHT: 107.4 LBS | HEART RATE: 97 BPM | DIASTOLIC BLOOD PRESSURE: 57 MMHG | SYSTOLIC BLOOD PRESSURE: 93 MMHG | HEIGHT: 63 IN

## 2025-02-26 DIAGNOSIS — M25.612 DECREASED ROM OF LEFT SHOULDER: ICD-10-CM

## 2025-02-26 DIAGNOSIS — M25.512 ACUTE PAIN OF LEFT SHOULDER: ICD-10-CM

## 2025-02-26 DIAGNOSIS — F90.2 ATTENTION DEFICIT HYPERACTIVITY DISORDER (ADHD), COMBINED TYPE: ICD-10-CM

## 2025-02-26 DIAGNOSIS — R53.83 FATIGUE, UNSPECIFIED TYPE: ICD-10-CM

## 2025-02-26 DIAGNOSIS — M25.512 ACUTE PAIN OF LEFT SHOULDER: Primary | ICD-10-CM

## 2025-02-26 DIAGNOSIS — F90.2 ATTENTION DEFICIT HYPERACTIVITY DISORDER (ADHD), COMBINED TYPE: Primary | ICD-10-CM

## 2025-02-26 DIAGNOSIS — R52 GENERALIZED BODY ACHES: ICD-10-CM

## 2025-02-26 DIAGNOSIS — G47.09 OTHER INSOMNIA: ICD-10-CM

## 2025-02-26 LAB
25(OH)D3 SERPL-MCNC: 30.6 NG/ML (ref 30–100)
ALBUMIN SERPL-MCNC: 4.3 G/DL (ref 3.5–5)
ALBUMIN/GLOB SERPL: 1.3 G/DL (ref 1.1–2.5)
ALP SERPL-CCNC: 42 U/L (ref 24–120)
ALT SERPL W P-5'-P-CCNC: 15 U/L (ref 0–35)
AMPHET+METHAMPHET UR QL: POSITIVE
AMPHETAMINES UR QL: NEGATIVE
ANION GAP SERPL CALCULATED.3IONS-SCNC: 11 MMOL/L (ref 4–13)
AST SERPL-CCNC: 18 U/L (ref 7–45)
AUTO MIXED CELLS #: 0.4 10*3/MM3 (ref 0.1–2.6)
AUTO MIXED CELLS %: 7.4 % (ref 0.1–24)
BARBITURATES UR QL SCN: NEGATIVE
BENZODIAZ UR QL SCN: NEGATIVE
BILIRUB SERPL-MCNC: 0.5 MG/DL (ref 0.1–1)
BILIRUB UR QL STRIP: ABNORMAL
BUN SERPL-MCNC: 10 MG/DL (ref 5–21)
BUN/CREAT SERPL: 14.3
BUPRENORPHINE SERPL-MCNC: NEGATIVE NG/ML
CALCIUM SPEC-SCNC: 8.9 MG/DL (ref 8.6–10.5)
CANNABINOIDS SERPL QL: NEGATIVE
CHLORIDE SERPL-SCNC: 103 MMOL/L (ref 98–110)
CHROMATIN AB SERPL-ACNC: <10 IU/ML (ref 0–14)
CLARITY UR: CLEAR
CO2 SERPL-SCNC: 25 MMOL/L (ref 24–31)
COCAINE UR QL: NEGATIVE
COLOR UR: YELLOW
CREAT SERPL-MCNC: 0.7 MG/DL (ref 0.5–1.4)
CRP SERPL-MCNC: <0.3 MG/DL (ref 0–0.5)
EGFRCR SERPLBLD CKD-EPI 2021: 116.6 ML/MIN/1.73
ERYTHROCYTE [DISTWIDTH] IN BLOOD BY AUTOMATED COUNT: 13.1 % (ref 12.3–15.4)
FENTANYL UR-MCNC: NEGATIVE NG/ML
GLOBULIN UR ELPH-MCNC: 3.2 GM/DL
GLUCOSE SERPL-MCNC: 90 MG/DL (ref 65–99)
GLUCOSE UR STRIP-MCNC: NEGATIVE MG/DL
HCT VFR BLD AUTO: 39.7 % (ref 34–46.6)
HGB BLD-MCNC: 12.7 G/DL (ref 12–15.9)
HGB UR QL STRIP.AUTO: NEGATIVE
KETONES UR QL STRIP: NEGATIVE
LEUKOCYTE ESTERASE UR QL STRIP.AUTO: NEGATIVE
LYMPHOCYTES # BLD AUTO: 1.7 10*3/MM3 (ref 0.7–3.1)
LYMPHOCYTES NFR BLD AUTO: 32.5 % (ref 19.6–45.3)
MCH RBC QN AUTO: 29.6 PG (ref 26.6–33)
MCHC RBC AUTO-ENTMCNC: 32 G/DL (ref 31.5–35.7)
MCV RBC AUTO: 92.5 FL (ref 79–97)
METHADONE UR QL SCN: NEGATIVE
NEUTROPHILS NFR BLD AUTO: 3.2 10*3/MM3 (ref 1.7–7)
NEUTROPHILS NFR BLD AUTO: 60.1 % (ref 42.7–76)
NITRITE UR QL STRIP: NEGATIVE
OPIATES UR QL: NEGATIVE
OXYCODONE UR QL SCN: NEGATIVE
PCP UR QL SCN: NEGATIVE
PH UR STRIP.AUTO: 6 [PH] (ref 5–8)
PLATELET # BLD AUTO: 248 10*3/MM3 (ref 140–450)
PMV BLD AUTO: 9.6 FL (ref 6–12)
POTASSIUM SERPL-SCNC: 3.6 MMOL/L (ref 3.5–5.3)
PROT SERPL-MCNC: 7.5 G/DL (ref 6.3–8.7)
PROT UR QL STRIP: NEGATIVE
RBC # BLD AUTO: 4.29 10*6/MM3 (ref 3.77–5.28)
SODIUM SERPL-SCNC: 139 MMOL/L (ref 135–145)
SP GR UR STRIP: >=1.03 (ref 1–1.03)
TRICYCLICS UR QL SCN: NEGATIVE
TSH SERPL DL<=0.05 MIU/L-ACNC: 0.7 UIU/ML (ref 0.27–4.2)
UROBILINOGEN UR QL STRIP: ABNORMAL
VIT B12 BLD-MCNC: 802 PG/ML (ref 211–946)
WBC NRBC COR # BLD AUTO: 5.3 10*3/MM3 (ref 3.4–10.8)

## 2025-02-26 PROCEDURE — 36415 COLL VENOUS BLD VENIPUNCTURE: CPT

## 2025-02-26 PROCEDURE — 80050 GENERAL HEALTH PANEL: CPT

## 2025-02-26 PROCEDURE — 81003 URINALYSIS AUTO W/O SCOPE: CPT

## 2025-02-26 PROCEDURE — 86140 C-REACTIVE PROTEIN: CPT

## 2025-02-26 PROCEDURE — 82607 VITAMIN B-12: CPT

## 2025-02-26 PROCEDURE — 86431 RHEUMATOID FACTOR QUANT: CPT

## 2025-02-26 PROCEDURE — 73030 X-RAY EXAM OF SHOULDER: CPT

## 2025-02-26 PROCEDURE — 99214 OFFICE O/P EST MOD 30 MIN: CPT

## 2025-02-26 PROCEDURE — 80307 DRUG TEST PRSMV CHEM ANLYZR: CPT

## 2025-02-26 PROCEDURE — 82306 VITAMIN D 25 HYDROXY: CPT

## 2025-02-26 PROCEDURE — 1160F RVW MEDS BY RX/DR IN RCRD: CPT

## 2025-02-26 PROCEDURE — 86038 ANTINUCLEAR ANTIBODIES: CPT

## 2025-02-26 PROCEDURE — 1159F MED LIST DOCD IN RCRD: CPT

## 2025-02-26 RX ORDER — VALACYCLOVIR HYDROCHLORIDE 500 MG/1
500 TABLET, FILM COATED ORAL DAILY
Qty: 30 TABLET | Refills: 12 | Status: SHIPPED | OUTPATIENT
Start: 2025-02-26

## 2025-02-26 RX ORDER — CLONIDINE HYDROCHLORIDE 0.1 MG/1
0.1 TABLET ORAL 3 TIMES DAILY PRN
Qty: 90 TABLET | Refills: 0 | Status: SHIPPED | OUTPATIENT
Start: 2025-02-26

## 2025-02-26 RX ORDER — DEXTROAMPHETAMINE SULFATE, DEXTROAMPHETAMINE SACCHARATE, AMPHETAMINE SULFATE AND AMPHETAMINE ASPARTATE 7.5; 7.5; 7.5; 7.5 MG/1; MG/1; MG/1; MG/1
CAPSULE, EXTENDED RELEASE ORAL
Qty: 30 CAPSULE | Refills: 0 | Status: SHIPPED | OUTPATIENT
Start: 2025-02-26

## 2025-02-26 NOTE — PROGRESS NOTES
"Chief Complaint  ADHD    Subjective    History of Present Illness      Patient presents to [facility] for   History of Present Illness  Pt last seen 11/19/24. Pt here for 3 month f/u. Pt also c/o of left shoulder pain. She states this pain has been going on for approx 1 year. Denies any known injury to area at this time.       History of Present Illness      Review of Systems   Constitutional:  Positive for fatigue.   Musculoskeletal:  Positive for arthralgias.        Left shoulder pain    All other systems reviewed and are negative.      I have reviewed and agree with the HPI and ROS information as above.  Rosana Taylor, APRN     Objective   Vital Signs:   BP 93/57   Pulse 97   Resp 18   Ht 160 cm (63\")   Wt 48.7 kg (107 lb 6.4 oz)   SpO2 98%   BMI 19.03 kg/m²     BMI is within normal parameters. No other follow-up for BMI required.      Physical Exam  Constitutional:       Appearance: Normal appearance. She is well-developed.   HENT:      Head: Normocephalic and atraumatic.      Right Ear: Tympanic membrane, ear canal and external ear normal.      Left Ear: Tympanic membrane, ear canal and external ear normal.      Nose: Nose normal. No septal deviation, nasal tenderness or congestion.      Mouth/Throat:      Lips: Pink. No lesions.      Mouth: Mucous membranes are moist. No oral lesions.      Dentition: Normal dentition.      Pharynx: Oropharynx is clear. No pharyngeal swelling, oropharyngeal exudate or posterior oropharyngeal erythema.   Eyes:      General: Lids are normal. Vision grossly intact. No scleral icterus.        Right eye: No discharge.         Left eye: No discharge.      Extraocular Movements: Extraocular movements intact.      Conjunctiva/sclera: Conjunctivae normal.      Right eye: Right conjunctiva is not injected.      Left eye: Left conjunctiva is not injected.      Pupils: Pupils are equal, round, and reactive to light.   Neck:      Thyroid: No thyroid " mass.      Trachea: Trachea normal.   Cardiovascular:      Rate and Rhythm: Normal rate and regular rhythm.      Heart sounds: Normal heart sounds. No murmur heard.     No gallop.   Pulmonary:      Effort: Pulmonary effort is normal.      Breath sounds: Normal breath sounds and air entry. No wheezing, rhonchi or rales.   Abdominal:      General: There is no distension.      Palpations: Abdomen is soft. There is no mass.      Tenderness: There is no abdominal tenderness. There is no right CVA tenderness, left CVA tenderness, guarding or rebound.   Musculoskeletal:         General: No tenderness or deformity. Normal range of motion.      Left shoulder: Tenderness present. Decreased range of motion.      Cervical back: Full passive range of motion without pain, normal range of motion and neck supple.      Thoracic back: Normal.      Right lower leg: No edema.      Left lower leg: No edema.   Skin:     General: Skin is warm and dry.      Coloration: Skin is not jaundiced.      Findings: No rash.   Neurological:      Mental Status: She is alert and oriented to person, place, and time.      Sensory: Sensation is intact.      Motor: Motor function is intact.      Coordination: Coordination is intact.      Gait: Gait is intact.      Deep Tendon Reflexes: Reflexes are normal and symmetric.   Psychiatric:         Mood and Affect: Mood and affect normal.         Judgment: Judgment normal.          HARSHAL-7:      PHQ-2 Depression Screening    Little interest or pleasure in doing things? Not at all   Feeling down, depressed, or hopeless? Not at all   PHQ-2 Total Score 0      PHQ-9 Depression Screening  Little interest or pleasure in doing things? Not at all   Feeling down, depressed, or hopeless? Not at all   PHQ-2 Total Score 0   Trouble falling or staying asleep, or sleeping too much?     Feeling tired or having little energy?     Poor appetite or overeating?     Feeling bad about yourself - or that you are a failure or have let  yourself or your family down?     Trouble concentrating on things, such as reading the newspaper or watching television?     Moving or speaking so slowly that other people could have noticed? Or the opposite - being so fidgety or restless that you have been moving around a lot more than usual?     Thoughts that you would be better off dead, or of hurting yourself in some way?     PHQ-9 Total Score     If you checked off any problems, how difficult have these problems made it for you to do your work, take care of things at home, or get along with other people? Not difficult at all           Result Review  Data Reviewed:                   Assessment and Plan      Diagnoses and all orders for this visit:    1. Attention deficit hyperactivity disorder (ADHD), combined type (Primary)  -     Urine Drug Screen - Urine, Clean Catch; Future    2. Other insomnia  -     cloNIDine (CATAPRES) 0.1 MG tablet; Take 1 tablet by mouth 3 (Three) Times a Day As Needed for High Blood Pressure.  Dispense: 90 tablet; Refill: 0    3. Acute pain of left shoulder  -     XR Shoulder 2+ View Left; Future    4. Decreased ROM of left shoulder  -     XR Shoulder 2+ View Left; Future    5. Fatigue, unspecified type  -     CBC & Differential; Future  -     Comprehensive Metabolic Panel; Future  -     Vitamin D,25-Hydroxy; Future  -     Vitamin B12; Future  -     TSH; Future  -     Urinalysis With Culture If Indicated -; Future  -     VELMA; Future  -     C-reactive protein; Future  -     Rheumatoid Factor; Future    6. Generalized body aches  -     VELMA; Future  -     C-reactive protein; Future  -     Rheumatoid Factor; Future    Other orders  -     valACYclovir (VALTREX) 500 MG tablet; Take 1 tablet by mouth Daily.  Dispense: 30 tablet; Refill: 12        Assessment & Plan    Patient is seen today for ADHD along with insomnia.  Patient is doing well on her current medication regimens.  Patient does have 2 complaints today.  Her initial complaint is  left shoulder pain and decreased range of motion.  Patient is only able to raise her arm about a 90 degree angle.  Patient can move her arm backwards along with forwards.  Patient feels a rubbing and clicking sensation when moving her arm upward.  I discussed with her that we will do an x-ray but she is likely going to have an MRI.  Patient voiced understanding.  Patient also complains of severe fatigue and bodyaches.  She states that this has been ongoing and this is not something new.  Patient is requesting to have lab work to work this up.  I agree with this today.  Patient denies any joint swelling, nausea, vomiting.  Patient states that just like walking up and down stairs she is absolutely exhausted and her joints hurt.  That is just 1 example.  Discussed with her that we will do a workup regarding this today.    Plan:  1.  ADHD stable and controlled with Adderall XR 30.  UDS is ordered today.  Reji pending.  2.  X-ray of left shoulder.  3.  CBC, CMP, vitamin D, vitamin B12, TSH, UA, VELMA, CRP, rheumatoid factor.  4.  Refill Valtrex.        Follow Up   Return in about 3 months (around 5/26/2025) for ADHD.  Patient was given instructions and counseling regarding her condition or for health maintenance advice. Please see specific information pulled into the AVS if appropriate.     Patient or patient representative verbalized consent for the use of Ambient Listening during the visit with  NAEEM Esparza for chart documentation. 2/26/2025  09:32 CST

## 2025-02-26 NOTE — TELEPHONE ENCOUNTER
----- Message from Rosana Taylor sent at 2/26/2025 10:37 AM CST -----  Please let patient know that xray shows no acute abnormalities, I will order MRI as we discussed

## 2025-02-27 LAB — ANA SER QL: NEGATIVE

## 2025-02-28 ENCOUNTER — TELEPHONE (OUTPATIENT)
Dept: FAMILY MEDICINE CLINIC | Facility: CLINIC | Age: 35
End: 2025-02-28
Payer: COMMERCIAL

## 2025-02-28 DIAGNOSIS — R79.89 LOW VITAMIN D LEVEL: Primary | ICD-10-CM

## 2025-02-28 RX ORDER — ERGOCALCIFEROL 1.25 MG/1
50000 CAPSULE, LIQUID FILLED ORAL WEEKLY
Qty: 6 CAPSULE | Refills: 0 | Status: SHIPPED | OUTPATIENT
Start: 2025-02-28 | End: 2025-04-05

## 2025-02-28 NOTE — TELEPHONE ENCOUNTER
----- Message from Rosana Taylor sent at 2/28/2025  7:48 AM CST -----  Please let patient know that labs show   Uds stable   Ua stable     Vitam in b12 stable  Vitamin D is stable but on the low end. Patient would benefit from Vitamin D 50,000U qweekly x6 weeks and I can send this in if she would like to try this   TSH stable   CRP stable  Rheumatoid factor stable   CMP stable  CBC stable

## 2025-02-28 NOTE — TELEPHONE ENCOUNTER
Attempted to contact pt with no answer. Left voicemail to call office back. Sent [employer] message as well.

## 2025-03-10 ENCOUNTER — TELEPHONE (OUTPATIENT)
Dept: FAMILY MEDICINE CLINIC | Facility: CLINIC | Age: 35
End: 2025-03-10
Payer: COMMERCIAL

## 2025-03-10 DIAGNOSIS — M25.512 ACUTE PAIN OF LEFT SHOULDER: Primary | ICD-10-CM

## 2025-03-10 DIAGNOSIS — M25.672 DECREASED RANGE OF MOTION OF LEFT ANKLE: ICD-10-CM

## 2025-04-02 DIAGNOSIS — F90.2 ATTENTION DEFICIT HYPERACTIVITY DISORDER (ADHD), COMBINED TYPE: ICD-10-CM

## 2025-04-02 RX ORDER — DEXTROAMPHETAMINE SULFATE, DEXTROAMPHETAMINE SACCHARATE, AMPHETAMINE SULFATE AND AMPHETAMINE ASPARTATE 7.5; 7.5; 7.5; 7.5 MG/1; MG/1; MG/1; MG/1
CAPSULE, EXTENDED RELEASE ORAL
Qty: 30 CAPSULE | Refills: 0 | Status: SHIPPED | OUTPATIENT
Start: 2025-04-02

## 2025-04-02 NOTE — TELEPHONE ENCOUNTER
Pharm[facility], [facility] [place], requesting adderall xr 30mg    Last filled 2/26/25 Qty: 30  CMP up to date 2/26/25  UDS last done 2/26/25 and stable    Routing to Dr. Barkley

## 2025-05-16 ENCOUNTER — NURSE TRIAGE (OUTPATIENT)
Dept: CALL CENTER | Facility: HOSPITAL | Age: 35
End: 2025-05-16
Payer: COMMERCIAL

## 2025-05-16 NOTE — TELEPHONE ENCOUNTER
Pt called in concerned about a weird scenerio that just occurred she said--Had allergic reaction to a cat she thinks on Friday, when talking to her the s/s of throat swelling and difficulty breathing had already subsided.  She feels weird but ok she states and her throat is sore.  Advised to monitor and go to ER if she feels S/s coming back have someone drive her to ER.  Also could go to  at this time if concerns arise.  Care advice given. See PCP on Monday or call them to let them know what happened. She feels ok right now.

## 2025-05-16 NOTE — TELEPHONE ENCOUNTER
"Reason for Disposition  • [1] Took antihistamine (e.g., Benadryl) by mouth AND [2] no symptoms now    Additional Information  • Negative: [1] Life-threatening reaction in the past to similar substance (e.g., food, insect bite/sting, medication, etc.) AND [2] < 2 hours since exposure  • Negative: Wheezing, stridor, hoarseness, or difficulty breathing  • Negative: [1] Tightness in the chest or throat AND [2] begins within 2 hours of exposure to allergic substance  • Negative: Difficulty swallowing, drooling or slurred speech  • Negative: Difficult to awaken or acting confused (e.g., disoriented, slurred speech)  • Negative: Unresponsive, passed out or very weak  • Negative: Other symptom of severe allergic reaction  (Exception: Hives or facial swelling alone.)  • Negative: Sounds like a life-threatening emergency to the triager  • Negative: [1] Widespread hives AND [2] onset > 2 hours after exposure to high-risk allergen (e.g., sting, nuts, 1st dose of antibiotic)  • Negative: [1] Widespread itching AND [2] onset > 2 hours after exposure to high-risk allergen (e.g., sting, nuts, 1st dose of antibiotic)  • Negative: [1] Face swelling AND [2] onset > 2 hours after exposure to high-risk allergen (e.g., sting, nuts, 1st dose of antibiotic)  • Negative: [1] Widespread hives, itching or facial swelling AND [2] onset < 2 hours of exposure to high-risk allergen (e.g., sting, nuts, 1st dose of antibiotic)  • Negative: [1] Vomiting or abdominal cramps AND [2] onset < 2 hours of exposure to high-risk allergen (e.g., sting, nuts, 1st dose of antibiotic)  • Negative: [1] Had epinephrine shot AND [2] no symptoms now  • Negative: [1] Used asthma inhaler or neb AND [2] no symptoms now    Answer Assessment - Initial Assessment Questions  1. SYMPTOMS: \"What is the most serious symptom?\"      Throat started to close and feel weird but has since resolved itself and pt talking to me on phone.  2. AIRWAY: \"Are they breathing?\" (e.g., Yes, " "No)  (R/O: airway blockage, respiratory arrest)       Yes she is fine at the moment just throat feels weird   3. BREATHING: \"Is there difficulty breathing?\" (e.g., Yes, No; wheezing, unable to complete a sentence)  (R/O: respiratory distress)      Yes but has resolved   4. CIRCULATION: \"Are you feeling weak?\"  (e.g., Yes, No, Unknown; severity)  (R/O: shock) If Yes, ask: \"Can you stand and walk normally?\"      Yes a little weird feeling but can walk/stand   5. SWALLOWING: \"Can you swallow?\" (e.g.,Yes, No; food, fluid, saliva)       Yes but throat is sore   6. ONSET: \"When did the reaction start?\" (Minutes or hours ago)       Mins ago   7. SUBSTANCE: \"What are you reacting to?\" \"When did the contact occur?\"       A neighbors cat she thinks   8. PREVIOUS REACTION: \"Have you ever reacted to it before?\" If Yes, ask: \"What happened that time?\"      Allergies to cats but not this bad ever  9. EPINEPHRINE: \"Do you have an epinephrine autoinjector (e.g., EpiPen)?\"      no    Protocols used: Anaphylaxis-ADULT-AH    "

## 2025-05-19 NOTE — TELEPHONE ENCOUNTER
Pt states that she is doing well. She did develop a rash on her face but after she cleansed it well and applied benadryl cream it went away. She requested appt for med check, so she is scheduled for Wednesday morning.

## 2025-05-21 ENCOUNTER — OFFICE VISIT (OUTPATIENT)
Dept: FAMILY MEDICINE CLINIC | Facility: CLINIC | Age: 35
End: 2025-05-21
Payer: COMMERCIAL

## 2025-05-21 VITALS
DIASTOLIC BLOOD PRESSURE: 67 MMHG | WEIGHT: 113.2 LBS | HEIGHT: 63 IN | SYSTOLIC BLOOD PRESSURE: 96 MMHG | BODY MASS INDEX: 20.06 KG/M2 | HEART RATE: 76 BPM

## 2025-05-21 DIAGNOSIS — G47.09 OTHER INSOMNIA: ICD-10-CM

## 2025-05-21 DIAGNOSIS — F90.2 ATTENTION DEFICIT HYPERACTIVITY DISORDER (ADHD), COMBINED TYPE: Primary | ICD-10-CM

## 2025-05-21 PROCEDURE — 99214 OFFICE O/P EST MOD 30 MIN: CPT | Performed by: NURSE PRACTITIONER

## 2025-05-21 RX ORDER — DEXTROAMPHETAMINE SACCHARATE, AMPHETAMINE ASPARTATE MONOHYDRATE, DEXTROAMPHETAMINE SULFATE AND AMPHETAMINE SULFATE 7.5; 7.5; 7.5; 7.5 MG/1; MG/1; MG/1; MG/1
30 CAPSULE, EXTENDED RELEASE ORAL EVERY MORNING
Qty: 30 CAPSULE | Refills: 0 | Status: SHIPPED | OUTPATIENT
Start: 2025-06-18 | End: 2025-07-18

## 2025-05-21 RX ORDER — CLONIDINE HYDROCHLORIDE 0.1 MG/1
0.1 TABLET ORAL 3 TIMES DAILY PRN
Qty: 90 TABLET | Refills: 0 | Status: SHIPPED | OUTPATIENT
Start: 2025-05-21

## 2025-05-21 RX ORDER — DEXTROAMPHETAMINE SACCHARATE, AMPHETAMINE ASPARTATE MONOHYDRATE, DEXTROAMPHETAMINE SULFATE AND AMPHETAMINE SULFATE 7.5; 7.5; 7.5; 7.5 MG/1; MG/1; MG/1; MG/1
30 CAPSULE, EXTENDED RELEASE ORAL EVERY MORNING
Qty: 30 CAPSULE | Refills: 0 | Status: SHIPPED | OUTPATIENT
Start: 2025-05-21 | End: 2025-06-20

## 2025-05-21 NOTE — PROGRESS NOTES
"Chief Complaint  Med check    Subjective    History of Present Illness      Patient presents to [facility] for   History of Present Illness   Today for a follow-up on ADHD.  She states that she is doing well on the Adderall XR 30 mg and needing a refill today.  History of Present Illness          Objective   Vital Signs:   BP 96/67   Pulse 76   Ht 160 cm (62.99\")   Wt 51.3 kg (113 lb 3.2 oz)   BMI 20.06 kg/m²     BMI is within normal parameters. No other follow-up for BMI required.      Physical Exam  Vitals and nursing note reviewed.   Constitutional:       Appearance: Normal appearance. She is well-developed.   HENT:      Head: Normocephalic and atraumatic.      Right Ear: Tympanic membrane, ear canal and external ear normal.      Left Ear: Tympanic membrane, ear canal and external ear normal.      Nose: Nose normal. No septal deviation, nasal tenderness or congestion.      Mouth/Throat:      Lips: Pink. No lesions.      Mouth: Mucous membranes are moist. No oral lesions.      Dentition: Normal dentition.      Pharynx: Oropharynx is clear. No pharyngeal swelling, oropharyngeal exudate or posterior oropharyngeal erythema.   Eyes:      General: Lids are normal. Vision grossly intact. No scleral icterus.        Right eye: No discharge.         Left eye: No discharge.      Extraocular Movements: Extraocular movements intact.      Conjunctiva/sclera: Conjunctivae normal.      Right eye: Right conjunctiva is not injected.      Left eye: Left conjunctiva is not injected.      Pupils: Pupils are equal, round, and reactive to light.   Neck:      Thyroid: No thyroid mass.      Trachea: Trachea normal.   Cardiovascular:      Rate and Rhythm: Normal rate and regular rhythm.      Heart sounds: Normal heart sounds. No murmur heard.     No gallop.   Pulmonary:      Effort: Pulmonary effort is normal.      Breath sounds: Normal breath sounds and air entry. No wheezing, rhonchi or rales. "   Musculoskeletal:         General: No tenderness or deformity. Normal range of motion.      Cervical back: Full passive range of motion without pain, normal range of motion and neck supple.      Thoracic back: Normal.      Right lower leg: No edema.      Left lower leg: No edema.   Skin:     General: Skin is warm and dry.      Coloration: Skin is not jaundiced.      Findings: No rash.   Neurological:      Mental Status: She is alert and oriented to person, place, and time.      Sensory: Sensation is intact.      Motor: Motor function is intact.      Coordination: Coordination is intact.      Gait: Gait is intact.      Deep Tendon Reflexes: Reflexes are normal and symmetric.   Psychiatric:         Mood and Affect: Mood and affect normal.         Behavior: Behavior normal.         Judgment: Judgment normal.                    Result Review  Data Reviewed:                   Assessment and Plan      Diagnoses and all orders for this visit:    1. Attention deficit hyperactivity disorder (ADHD), combined type (Primary)  Comments:  stable. cont adderal xr 30  uds and CSA are utd    2. Other insomnia  -     cloNIDine (CATAPRES) 0.1 MG tablet; Take 1 tablet by mouth 3 (Three) Times a Day As Needed for High Blood Pressure.  Dispense: 90 tablet; Refill: 0      ADHD Follow up:    PDMP reviewed and is clean. ADRS (Adult ADHD Assessment Form) reviewed in detail, scanned in chart, and has been reviewed at time of appointment.  All questions, including medication and side effects, were discussed in detail at time of patient's visit. Patient will continue same medication which was discussed at today's visit. Patient is to return in 3 month(s).    Last Urine Toxicity  More data exists         Latest Ref Rng & Units 2/26/2025 12/4/2023   LAST URINE TOXICITY RESULTS   Amphetamine, Urine Qual Negative Positive  Negative    Barbiturates Screen, Urine Negative Negative  Negative    Benzodiazepine Screen, Urine Negative Negative  Negative     Buprenorphine, Screen, Urine Negative Negative  Negative    Cocaine Screen, Urine Negative Negative  Negative    Fentanyl, Urine Negative Negative  Negative    Methadone Screen , Urine Negative Negative  Negative    Methamphetamine, Ur Negative Negative  Negative         Urine Drug Screen Results: UDS RESULTS: Current results appropriate    CSA completed on: 7/29/24                  Follow Up   Return in about 3 months (around 8/21/2025) for ADHD follow up.  Patient was given instructions and counseling regarding her condition or for health maintenance advice. Please see specific information pulled into the AVS if appropriate.

## 2025-07-30 DIAGNOSIS — F90.2 ATTENTION DEFICIT HYPERACTIVITY DISORDER (ADHD), COMBINED TYPE: Primary | ICD-10-CM

## 2025-07-30 RX ORDER — DEXTROAMPHETAMINE SACCHARATE, AMPHETAMINE ASPARTATE MONOHYDRATE, DEXTROAMPHETAMINE SULFATE AND AMPHETAMINE SULFATE 7.5; 7.5; 7.5; 7.5 MG/1; MG/1; MG/1; MG/1
30 CAPSULE, EXTENDED RELEASE ORAL EVERY MORNING
COMMUNITY
End: 2025-07-30 | Stop reason: SDUPTHER

## 2025-07-30 RX ORDER — DEXTROAMPHETAMINE SACCHARATE, AMPHETAMINE ASPARTATE MONOHYDRATE, DEXTROAMPHETAMINE SULFATE AND AMPHETAMINE SULFATE 7.5; 7.5; 7.5; 7.5 MG/1; MG/1; MG/1; MG/1
30 CAPSULE, EXTENDED RELEASE ORAL EVERY MORNING
Qty: 30 CAPSULE | Refills: 0 | Status: SHIPPED | OUTPATIENT
Start: 2025-07-30

## 2025-07-30 NOTE — TELEPHONE ENCOUNTER
Rx Refill Note  Requested Prescriptions     Pending Prescriptions Disp Refills    amphetamine-dextroamphetamine XR (ADDERALL XR) 30 MG 24 hr capsule 30 capsule 0     Sig: Take 1 capsule by mouth Every Morning      Last office visit with prescribing clinician: 5/21/25  Last telemedicine visit with prescribing clinician: Visit date not found   Next office visit with prescribing clinician: Visit date not found    CSA not up to date 7/29/24  Last UDS was 2/26/25    Ana Catalan MA  07/30/25, 15:05 CDT

## 2025-07-30 NOTE — TELEPHONE ENCOUNTER
Called pt and confirmed . Pt states she's needing a refill on her adderalls because [facility] was supposed to fax request to us but never received.

## 2025-08-28 ENCOUNTER — TELEMEDICINE (OUTPATIENT)
Dept: FAMILY MEDICINE CLINIC | Facility: CLINIC | Age: 35
End: 2025-08-28
Payer: COMMERCIAL

## 2025-08-28 VITALS — WEIGHT: 113 LBS | BODY MASS INDEX: 20.02 KG/M2 | HEIGHT: 63 IN

## 2025-08-28 DIAGNOSIS — G47.09 OTHER INSOMNIA: ICD-10-CM

## 2025-08-28 DIAGNOSIS — F90.2 ATTENTION DEFICIT HYPERACTIVITY DISORDER (ADHD), COMBINED TYPE: Primary | ICD-10-CM

## 2025-08-28 DIAGNOSIS — T78.40XD ALLERGY, SUBSEQUENT ENCOUNTER: ICD-10-CM

## 2025-08-28 DIAGNOSIS — B00.9 HERPES: ICD-10-CM

## 2025-08-28 RX ORDER — VALACYCLOVIR HYDROCHLORIDE 500 MG/1
500 TABLET, FILM COATED ORAL DAILY
Qty: 30 TABLET | Refills: 12 | Status: SHIPPED | OUTPATIENT
Start: 2025-08-28

## 2025-08-28 RX ORDER — CLONIDINE HYDROCHLORIDE 0.1 MG/1
0.1 TABLET ORAL 3 TIMES DAILY PRN
Qty: 90 TABLET | Refills: 1 | Status: SHIPPED | OUTPATIENT
Start: 2025-08-28

## 2025-08-28 RX ORDER — DEXTROAMPHETAMINE SACCHARATE, AMPHETAMINE ASPARTATE MONOHYDRATE, DEXTROAMPHETAMINE SULFATE AND AMPHETAMINE SULFATE 7.5; 7.5; 7.5; 7.5 MG/1; MG/1; MG/1; MG/1
30 CAPSULE, EXTENDED RELEASE ORAL EVERY MORNING
Qty: 30 CAPSULE | Refills: 0 | Status: SHIPPED | OUTPATIENT
Start: 2025-09-25 | End: 2025-10-25

## 2025-08-28 RX ORDER — FLUTICASONE PROPIONATE 50 MCG
2 SPRAY, SUSPENSION (ML) NASAL DAILY
Qty: 9.9 G | Refills: 0 | Status: SHIPPED | OUTPATIENT
Start: 2025-08-28

## 2025-08-28 RX ORDER — DEXTROAMPHETAMINE SACCHARATE, AMPHETAMINE ASPARTATE MONOHYDRATE, DEXTROAMPHETAMINE SULFATE AND AMPHETAMINE SULFATE 7.5; 7.5; 7.5; 7.5 MG/1; MG/1; MG/1; MG/1
30 CAPSULE, EXTENDED RELEASE ORAL EVERY MORNING
Qty: 30 CAPSULE | Refills: 0 | Status: SHIPPED | OUTPATIENT
Start: 2025-08-28 | End: 2025-09-27

## (undated) DEVICE — GLV SURG SENSICARE PI ORTHO PF SZ7 LF STRL

## (undated) DEVICE — SUT GUT PLN 4/0 P3 18IN 1644G

## (undated) DEVICE — PAD MAJOR LITHOTOMY: Brand: MEDLINE INDUSTRIES, INC.

## (undated) DEVICE — PACKING 440406 10PK POPE EPISTAXIS: Brand: MEROCEL®

## (undated) DEVICE — NDL HYPO PRECISIONGLIDE REG 25G 1 1/2

## (undated) DEVICE — BAPTIST TURNOVER KIT: Brand: MEDLINE INDUSTRIES, INC.

## (undated) DEVICE — SPONGE,NEURO,0.5"X3",XR,STRL,LF,10/PK: Brand: MEDLINE

## (undated) DEVICE — NDL HYPO PRECISIONGLIDE/REG 18G 11/2 PNK

## (undated) DEVICE — STERILE RAYON TIPPED OB/GYN APPLICATORS: Brand: PURITAN

## (undated) DEVICE — KT ANTI FOG W/FLD AND SPNG

## (undated) DEVICE — GLV SURG BIOGEL M LTX PF 7 1/2

## (undated) DEVICE — SYR SLPTP 20CC

## (undated) DEVICE — PK TURNOVER RM ADV

## (undated) DEVICE — SYR CONTRL PRESS/LO FIX/M/LL W/THMB/RNG 10ML

## (undated) DEVICE — ELECTRD BALL EZ CLN STD 5IN

## (undated) DEVICE — PK ENT HD AND NK 30

## (undated) DEVICE — EVAC SMOKE LAP PLUMEAWAY 4L